# Patient Record
Sex: FEMALE | Race: WHITE | Employment: UNEMPLOYED | URBAN - METROPOLITAN AREA
[De-identification: names, ages, dates, MRNs, and addresses within clinical notes are randomized per-mention and may not be internally consistent; named-entity substitution may affect disease eponyms.]

---

## 2017-01-15 ENCOUNTER — COMMUNICATION - HEALTHEAST (OUTPATIENT)
Dept: INTERNAL MEDICINE | Facility: CLINIC | Age: 72
End: 2017-01-15

## 2017-01-15 DIAGNOSIS — F34.1 DYSTHYMIC DISORDER: ICD-10-CM

## 2017-02-01 ENCOUNTER — COMMUNICATION - HEALTHEAST (OUTPATIENT)
Dept: INTERNAL MEDICINE | Facility: CLINIC | Age: 72
End: 2017-02-01

## 2017-02-01 DIAGNOSIS — K21.9 GERD (GASTROESOPHAGEAL REFLUX DISEASE): ICD-10-CM

## 2017-03-29 ENCOUNTER — RECORDS - HEALTHEAST (OUTPATIENT)
Dept: ADMINISTRATIVE | Facility: OTHER | Age: 72
End: 2017-03-29

## 2017-03-29 ENCOUNTER — RECORDS - HEALTHEAST (OUTPATIENT)
Dept: BONE DENSITY | Facility: CLINIC | Age: 72
End: 2017-03-29

## 2017-03-29 DIAGNOSIS — Z78.0 ASYMPTOMATIC MENOPAUSAL STATE: ICD-10-CM

## 2017-04-04 ENCOUNTER — COMMUNICATION - HEALTHEAST (OUTPATIENT)
Dept: INTERNAL MEDICINE | Facility: CLINIC | Age: 72
End: 2017-04-04

## 2017-04-04 DIAGNOSIS — F41.9 ANXIETY: ICD-10-CM

## 2017-04-27 ENCOUNTER — COMMUNICATION - HEALTHEAST (OUTPATIENT)
Dept: INTERNAL MEDICINE | Facility: CLINIC | Age: 72
End: 2017-04-27

## 2017-04-28 ENCOUNTER — RECORDS - HEALTHEAST (OUTPATIENT)
Dept: ADMINISTRATIVE | Facility: OTHER | Age: 72
End: 2017-04-28

## 2017-05-03 ENCOUNTER — COMMUNICATION - HEALTHEAST (OUTPATIENT)
Dept: INTERNAL MEDICINE | Facility: CLINIC | Age: 72
End: 2017-05-03

## 2017-05-03 DIAGNOSIS — J45.40 MODERATE PERSISTENT ASTHMA: ICD-10-CM

## 2017-05-30 ENCOUNTER — OFFICE VISIT - HEALTHEAST (OUTPATIENT)
Dept: INTERNAL MEDICINE | Facility: CLINIC | Age: 72
End: 2017-05-30

## 2017-05-30 ENCOUNTER — HOSPITAL ENCOUNTER (OUTPATIENT)
Dept: LAB | Age: 72
Setting detail: SPECIMEN
Discharge: HOME | End: 2017-05-30

## 2017-05-30 ENCOUNTER — AMBULATORY - HEALTHEAST (OUTPATIENT)
Dept: INTERNAL MEDICINE | Facility: CLINIC | Age: 72
End: 2017-05-30

## 2017-05-30 ENCOUNTER — COMMUNICATION - HEALTHEAST (OUTPATIENT)
Dept: NURSING | Facility: CLINIC | Age: 72
End: 2017-05-30

## 2017-05-30 ENCOUNTER — RECORDS - HEALTHEAST (OUTPATIENT)
Dept: GENERAL RADIOLOGY | Facility: CLINIC | Age: 72
End: 2017-05-30

## 2017-05-30 DIAGNOSIS — M25.512 PAIN IN LEFT SHOULDER: ICD-10-CM

## 2017-05-30 DIAGNOSIS — M85.80 OSTEOPENIA WITH HIGH RISK OF FRACTURE: ICD-10-CM

## 2017-05-30 DIAGNOSIS — M25.512 LEFT SHOULDER PAIN: ICD-10-CM

## 2017-05-31 ENCOUNTER — COMMUNICATION - HEALTHEAST (OUTPATIENT)
Dept: NURSING | Facility: CLINIC | Age: 72
End: 2017-05-31

## 2017-06-01 ENCOUNTER — COMMUNICATION - HEALTHEAST (OUTPATIENT)
Dept: NURSING | Facility: CLINIC | Age: 72
End: 2017-06-01

## 2017-06-02 ENCOUNTER — COMMUNICATION - HEALTHEAST (OUTPATIENT)
Dept: NURSING | Facility: CLINIC | Age: 72
End: 2017-06-02

## 2017-06-02 LAB
ALBUMIN PERCENT: 62.1 % (ref 51–67)
ALBUMIN SERPL ELPH-MCNC: 4.3 G/DL (ref 3.2–4.7)
ALPHA 1 PERCENT: 2.1 % (ref 2–4)
ALPHA 2 PERCENT: 12.1 % (ref 5–13)
ALPHA1 GLOB SERPL ELPH-MCNC: 0.1 G/DL (ref 0.1–0.3)
ALPHA2 GLOB SERPL ELPH-MCNC: 0.8 G/DL (ref 0.4–0.9)
B-GLOBULIN SERPL ELPH-MCNC: 0.7 G/DL (ref 0.7–1.2)
BETA PERCENT: 10.4 % (ref 10–17)
GAMMA GLOB SERPL ELPH-MCNC: 0.9 G/DL (ref 0.6–1.4)
GAMMA GLOBULIN PERCENT: 13.3 % (ref 9–20)
PATH ICD:: NORMAL
PROT PATTERN SERPL ELPH-IMP: NORMAL
PROT SERPL-MCNC: 6.9 G/DL (ref 6–8)
REVIEWING PATHOLOGIST: NORMAL

## 2017-06-12 ENCOUNTER — AMBULATORY - HEALTHEAST (OUTPATIENT)
Dept: CARE COORDINATION | Facility: CLINIC | Age: 72
End: 2017-06-12

## 2017-06-13 ENCOUNTER — COMMUNICATION - HEALTHEAST (OUTPATIENT)
Dept: INTERNAL MEDICINE | Facility: CLINIC | Age: 72
End: 2017-06-13

## 2017-06-16 ENCOUNTER — COMMUNICATION - HEALTHEAST (OUTPATIENT)
Dept: ADMINISTRATIVE | Facility: CLINIC | Age: 72
End: 2017-06-16

## 2017-06-16 DIAGNOSIS — M85.80 OSTEOPENIA WITH HIGH RISK OF FRACTURE: ICD-10-CM

## 2017-06-20 ENCOUNTER — COMMUNICATION - HEALTHEAST (OUTPATIENT)
Dept: NURSING | Facility: CLINIC | Age: 72
End: 2017-06-20

## 2017-06-20 ENCOUNTER — OFFICE VISIT - HEALTHEAST (OUTPATIENT)
Dept: INTERNAL MEDICINE | Facility: CLINIC | Age: 72
End: 2017-06-20

## 2017-06-20 DIAGNOSIS — J45.40 MODERATE PERSISTENT ASTHMA WITHOUT COMPLICATION: ICD-10-CM

## 2017-06-20 DIAGNOSIS — F34.1 DYSTHYMIC DISORDER: ICD-10-CM

## 2017-06-27 ENCOUNTER — OFFICE VISIT - HEALTHEAST (OUTPATIENT)
Dept: PHYSICAL THERAPY | Facility: REHABILITATION | Age: 72
End: 2017-06-27

## 2017-06-27 DIAGNOSIS — J45.40 MODERATE PERSISTENT ASTHMA: ICD-10-CM

## 2017-06-27 DIAGNOSIS — M25.812 SHOULDER IMPINGEMENT, LEFT: ICD-10-CM

## 2017-06-27 DIAGNOSIS — R29.898 SHOULDER WEAKNESS: ICD-10-CM

## 2017-06-27 DIAGNOSIS — E78.00 PURE HYPERCHOLESTEROLEMIA: ICD-10-CM

## 2017-06-27 DIAGNOSIS — I10 ESSENTIAL HYPERTENSION: ICD-10-CM

## 2017-06-27 DIAGNOSIS — M25.512 ACUTE PAIN OF LEFT SHOULDER: ICD-10-CM

## 2017-07-06 ENCOUNTER — OFFICE VISIT - HEALTHEAST (OUTPATIENT)
Dept: PHYSICAL THERAPY | Facility: REHABILITATION | Age: 72
End: 2017-07-06

## 2017-07-06 DIAGNOSIS — I10 ESSENTIAL HYPERTENSION: ICD-10-CM

## 2017-07-06 DIAGNOSIS — M25.512 ACUTE PAIN OF LEFT SHOULDER: ICD-10-CM

## 2017-07-06 DIAGNOSIS — E78.00 PURE HYPERCHOLESTEROLEMIA: ICD-10-CM

## 2017-07-06 DIAGNOSIS — J45.40 MODERATE PERSISTENT ASTHMA: ICD-10-CM

## 2017-07-06 DIAGNOSIS — R29.898 SHOULDER WEAKNESS: ICD-10-CM

## 2017-07-06 DIAGNOSIS — M25.812 SHOULDER IMPINGEMENT, LEFT: ICD-10-CM

## 2017-07-13 ENCOUNTER — OFFICE VISIT - HEALTHEAST (OUTPATIENT)
Dept: PHYSICAL THERAPY | Facility: REHABILITATION | Age: 72
End: 2017-07-13

## 2017-07-13 DIAGNOSIS — J45.40 MODERATE PERSISTENT ASTHMA: ICD-10-CM

## 2017-07-13 DIAGNOSIS — M25.512 ACUTE PAIN OF LEFT SHOULDER: ICD-10-CM

## 2017-07-13 DIAGNOSIS — R29.898 SHOULDER WEAKNESS: ICD-10-CM

## 2017-07-13 DIAGNOSIS — I10 ESSENTIAL HYPERTENSION: ICD-10-CM

## 2017-07-13 DIAGNOSIS — E78.00 PURE HYPERCHOLESTEROLEMIA: ICD-10-CM

## 2017-07-13 DIAGNOSIS — M25.812 SHOULDER IMPINGEMENT, LEFT: ICD-10-CM

## 2017-07-18 ENCOUNTER — COMMUNICATION - HEALTHEAST (OUTPATIENT)
Dept: FAMILY MEDICINE | Facility: CLINIC | Age: 72
End: 2017-07-18

## 2017-07-21 ENCOUNTER — COMMUNICATION - HEALTHEAST (OUTPATIENT)
Dept: INTERNAL MEDICINE | Facility: CLINIC | Age: 72
End: 2017-07-21

## 2017-07-21 DIAGNOSIS — F34.1 DYSTHYMIC DISORDER: ICD-10-CM

## 2017-07-27 ENCOUNTER — COMMUNICATION - HEALTHEAST (OUTPATIENT)
Dept: INTERNAL MEDICINE | Facility: CLINIC | Age: 72
End: 2017-07-27

## 2017-07-27 DIAGNOSIS — K21.9 GERD (GASTROESOPHAGEAL REFLUX DISEASE): ICD-10-CM

## 2017-07-31 ENCOUNTER — OFFICE VISIT - HEALTHEAST (OUTPATIENT)
Dept: PHYSICAL THERAPY | Facility: REHABILITATION | Age: 72
End: 2017-07-31

## 2017-07-31 DIAGNOSIS — J45.40 MODERATE PERSISTENT ASTHMA: ICD-10-CM

## 2017-07-31 DIAGNOSIS — M85.80 OSTEOPENIA WITH HIGH RISK OF FRACTURE: ICD-10-CM

## 2017-07-31 DIAGNOSIS — R29.898 SHOULDER WEAKNESS: ICD-10-CM

## 2017-07-31 DIAGNOSIS — M25.812 SHOULDER IMPINGEMENT, LEFT: ICD-10-CM

## 2017-07-31 DIAGNOSIS — E78.00 PURE HYPERCHOLESTEROLEMIA: ICD-10-CM

## 2017-07-31 DIAGNOSIS — M25.512 ACUTE PAIN OF LEFT SHOULDER: ICD-10-CM

## 2017-08-10 ENCOUNTER — OFFICE VISIT - HEALTHEAST (OUTPATIENT)
Dept: PHYSICAL THERAPY | Facility: REHABILITATION | Age: 72
End: 2017-08-10

## 2017-08-10 DIAGNOSIS — R29.898 SHOULDER WEAKNESS: ICD-10-CM

## 2017-08-10 DIAGNOSIS — M25.512 ACUTE PAIN OF LEFT SHOULDER: ICD-10-CM

## 2017-08-10 DIAGNOSIS — M25.812 SHOULDER IMPINGEMENT, LEFT: ICD-10-CM

## 2017-08-10 DIAGNOSIS — J45.40 MODERATE PERSISTENT ASTHMA: ICD-10-CM

## 2017-08-10 DIAGNOSIS — M85.80 OSTEOPENIA WITH HIGH RISK OF FRACTURE: ICD-10-CM

## 2017-08-10 DIAGNOSIS — E78.00 PURE HYPERCHOLESTEROLEMIA: ICD-10-CM

## 2017-08-11 ENCOUNTER — COMMUNICATION - HEALTHEAST (OUTPATIENT)
Dept: NURSING | Facility: CLINIC | Age: 72
End: 2017-08-11

## 2017-08-16 ENCOUNTER — COMMUNICATION - HEALTHEAST (OUTPATIENT)
Dept: SCHEDULING | Facility: CLINIC | Age: 72
End: 2017-08-16

## 2017-08-17 ENCOUNTER — OFFICE VISIT - HEALTHEAST (OUTPATIENT)
Dept: FAMILY MEDICINE | Facility: CLINIC | Age: 72
End: 2017-08-17

## 2017-08-17 DIAGNOSIS — H93.8X2 EAR PRESSURE, LEFT: ICD-10-CM

## 2017-08-17 ASSESSMENT — MIFFLIN-ST. JEOR: SCORE: 1054.12

## 2017-08-18 ENCOUNTER — OFFICE VISIT - HEALTHEAST (OUTPATIENT)
Dept: ENDOCRINOLOGY | Facility: CLINIC | Age: 72
End: 2017-08-18

## 2017-08-18 DIAGNOSIS — M85.80 OSTEOPENIA WITH HIGH RISK OF FRACTURE: ICD-10-CM

## 2017-08-18 ASSESSMENT — MIFFLIN-ST. JEOR: SCORE: 1052.3

## 2017-08-22 ENCOUNTER — OFFICE VISIT - HEALTHEAST (OUTPATIENT)
Dept: PHYSICAL THERAPY | Facility: REHABILITATION | Age: 72
End: 2017-08-22

## 2017-08-22 DIAGNOSIS — M85.80 OSTEOPENIA WITH HIGH RISK OF FRACTURE: ICD-10-CM

## 2017-08-22 DIAGNOSIS — M25.812 SHOULDER IMPINGEMENT, LEFT: ICD-10-CM

## 2017-08-22 DIAGNOSIS — E78.00 PURE HYPERCHOLESTEROLEMIA: ICD-10-CM

## 2017-08-22 DIAGNOSIS — R29.898 SHOULDER WEAKNESS: ICD-10-CM

## 2017-08-22 DIAGNOSIS — M25.512 ACUTE PAIN OF LEFT SHOULDER: ICD-10-CM

## 2017-08-24 ENCOUNTER — OFFICE VISIT - HEALTHEAST (OUTPATIENT)
Dept: PHYSICAL THERAPY | Facility: REHABILITATION | Age: 72
End: 2017-08-24

## 2017-08-24 DIAGNOSIS — I10 ESSENTIAL HYPERTENSION: ICD-10-CM

## 2017-08-24 DIAGNOSIS — R29.898 SHOULDER WEAKNESS: ICD-10-CM

## 2017-08-24 DIAGNOSIS — M25.512 ACUTE PAIN OF LEFT SHOULDER: ICD-10-CM

## 2017-08-24 DIAGNOSIS — J45.40 MODERATE PERSISTENT ASTHMA: ICD-10-CM

## 2017-08-24 DIAGNOSIS — E78.00 PURE HYPERCHOLESTEROLEMIA: ICD-10-CM

## 2017-08-24 DIAGNOSIS — M85.80 OSTEOPENIA WITH HIGH RISK OF FRACTURE: ICD-10-CM

## 2017-08-24 DIAGNOSIS — M25.812 SHOULDER IMPINGEMENT, LEFT: ICD-10-CM

## 2017-08-25 ENCOUNTER — OFFICE VISIT - HEALTHEAST (OUTPATIENT)
Dept: INTERNAL MEDICINE | Facility: CLINIC | Age: 72
End: 2017-08-25

## 2017-08-25 ENCOUNTER — COMMUNICATION - HEALTHEAST (OUTPATIENT)
Dept: INTERNAL MEDICINE | Facility: CLINIC | Age: 72
End: 2017-08-25

## 2017-08-25 DIAGNOSIS — H69.90 EUSTACHIAN TUBE DYSFUNCTION: ICD-10-CM

## 2017-08-28 ENCOUNTER — OFFICE VISIT - HEALTHEAST (OUTPATIENT)
Dept: PHYSICAL THERAPY | Facility: REHABILITATION | Age: 72
End: 2017-08-28

## 2017-08-28 DIAGNOSIS — M25.512 ACUTE PAIN OF LEFT SHOULDER: ICD-10-CM

## 2017-08-28 DIAGNOSIS — R29.898 SHOULDER WEAKNESS: ICD-10-CM

## 2017-08-28 DIAGNOSIS — I10 ESSENTIAL HYPERTENSION: ICD-10-CM

## 2017-08-28 DIAGNOSIS — J45.40 MODERATE PERSISTENT ASTHMA: ICD-10-CM

## 2017-08-28 DIAGNOSIS — M85.80 OSTEOPENIA WITH HIGH RISK OF FRACTURE: ICD-10-CM

## 2017-08-28 DIAGNOSIS — E78.00 PURE HYPERCHOLESTEROLEMIA: ICD-10-CM

## 2017-08-28 DIAGNOSIS — M25.812 SHOULDER IMPINGEMENT, LEFT: ICD-10-CM

## 2017-08-29 ENCOUNTER — RECORDS - HEALTHEAST (OUTPATIENT)
Dept: ADMINISTRATIVE | Facility: OTHER | Age: 72
End: 2017-08-29

## 2017-08-30 ENCOUNTER — COMMUNICATION - HEALTHEAST (OUTPATIENT)
Dept: SCHEDULING | Facility: CLINIC | Age: 72
End: 2017-08-30

## 2017-08-30 ENCOUNTER — COMMUNICATION - HEALTHEAST (OUTPATIENT)
Dept: NURSING | Facility: CLINIC | Age: 72
End: 2017-08-30

## 2017-08-30 ENCOUNTER — OFFICE VISIT - HEALTHEAST (OUTPATIENT)
Dept: INTERNAL MEDICINE | Facility: CLINIC | Age: 72
End: 2017-08-30

## 2017-08-30 ENCOUNTER — COMMUNICATION - HEALTHEAST (OUTPATIENT)
Dept: INTERNAL MEDICINE | Facility: CLINIC | Age: 72
End: 2017-08-30

## 2017-08-30 DIAGNOSIS — S81.801A LEG WOUND, RIGHT: ICD-10-CM

## 2017-08-30 DIAGNOSIS — I10 HTN (HYPERTENSION): ICD-10-CM

## 2017-08-31 ENCOUNTER — COMMUNICATION - HEALTHEAST (OUTPATIENT)
Dept: INTERNAL MEDICINE | Facility: CLINIC | Age: 72
End: 2017-08-31

## 2017-08-31 DIAGNOSIS — E78.00 HYPERCHOLESTEROLEMIA: ICD-10-CM

## 2017-09-02 ENCOUNTER — COMMUNICATION - HEALTHEAST (OUTPATIENT)
Dept: INTERNAL MEDICINE | Facility: CLINIC | Age: 72
End: 2017-09-02

## 2017-09-02 DIAGNOSIS — F41.9 ANXIETY: ICD-10-CM

## 2017-09-06 ENCOUNTER — COMMUNICATION - HEALTHEAST (OUTPATIENT)
Dept: INTERNAL MEDICINE | Facility: CLINIC | Age: 72
End: 2017-09-06

## 2017-09-06 DIAGNOSIS — K21.9 GERD (GASTROESOPHAGEAL REFLUX DISEASE): ICD-10-CM

## 2017-09-08 ENCOUNTER — COMMUNICATION - HEALTHEAST (OUTPATIENT)
Dept: NURSING | Facility: CLINIC | Age: 72
End: 2017-09-08

## 2017-09-13 ENCOUNTER — COMMUNICATION - HEALTHEAST (OUTPATIENT)
Dept: NURSING | Facility: CLINIC | Age: 72
End: 2017-09-13

## 2017-09-26 ENCOUNTER — OFFICE VISIT - HEALTHEAST (OUTPATIENT)
Dept: INTERNAL MEDICINE | Facility: CLINIC | Age: 72
End: 2017-09-26

## 2017-09-26 DIAGNOSIS — R05.9 COUGH: ICD-10-CM

## 2017-09-27 ENCOUNTER — COMMUNICATION - HEALTHEAST (OUTPATIENT)
Dept: INTERNAL MEDICINE | Facility: CLINIC | Age: 72
End: 2017-09-27

## 2017-10-20 ENCOUNTER — COMMUNICATION - HEALTHEAST (OUTPATIENT)
Dept: NURSING | Facility: CLINIC | Age: 72
End: 2017-10-20

## 2017-11-03 ENCOUNTER — COMMUNICATION - HEALTHEAST (OUTPATIENT)
Dept: NURSING | Facility: CLINIC | Age: 72
End: 2017-11-03

## 2017-11-08 ENCOUNTER — COMMUNICATION - HEALTHEAST (OUTPATIENT)
Dept: NURSING | Facility: CLINIC | Age: 72
End: 2017-11-08

## 2017-11-28 ENCOUNTER — COMMUNICATION - HEALTHEAST (OUTPATIENT)
Dept: INTERNAL MEDICINE | Facility: CLINIC | Age: 72
End: 2017-11-28

## 2017-11-28 DIAGNOSIS — K21.9 GERD (GASTROESOPHAGEAL REFLUX DISEASE): ICD-10-CM

## 2017-12-21 ENCOUNTER — COMMUNICATION - HEALTHEAST (OUTPATIENT)
Dept: FAMILY MEDICINE | Facility: CLINIC | Age: 72
End: 2017-12-21

## 2018-01-05 ENCOUNTER — COMMUNICATION - HEALTHEAST (OUTPATIENT)
Dept: NURSING | Facility: CLINIC | Age: 73
End: 2018-01-05

## 2018-01-12 ENCOUNTER — RECORDS - HEALTHEAST (OUTPATIENT)
Dept: ADMINISTRATIVE | Facility: OTHER | Age: 73
End: 2018-01-12

## 2018-01-23 ENCOUNTER — COMMUNICATION - HEALTHEAST (OUTPATIENT)
Dept: NURSING | Facility: CLINIC | Age: 73
End: 2018-01-23

## 2018-02-07 ENCOUNTER — OFFICE VISIT - HEALTHEAST (OUTPATIENT)
Dept: INTERNAL MEDICINE | Facility: CLINIC | Age: 73
End: 2018-02-07

## 2018-02-07 DIAGNOSIS — Z01.818 ENCOUNTER FOR PREOPERATIVE EXAMINATION FOR GENERAL SURGICAL PROCEDURE: ICD-10-CM

## 2018-02-07 DIAGNOSIS — I10 HTN (HYPERTENSION): ICD-10-CM

## 2018-02-07 DIAGNOSIS — Z01.810 PREOP CARDIOVASCULAR EXAM: ICD-10-CM

## 2018-02-07 DIAGNOSIS — K21.9 ESOPHAGEAL REFLUX: ICD-10-CM

## 2018-02-07 DIAGNOSIS — J45.40 MODERATE PERSISTENT ASTHMA: ICD-10-CM

## 2018-02-07 DIAGNOSIS — Z12.31 VISIT FOR SCREENING MAMMOGRAM: ICD-10-CM

## 2018-02-07 DIAGNOSIS — M17.11 ARTHRITIS OF RIGHT KNEE: ICD-10-CM

## 2018-02-07 LAB
ANION GAP SERPL CALCULATED.3IONS-SCNC: 8 MMOL/L (ref 5–18)
ATRIAL RATE - MUSE: 70 BPM
BUN SERPL-MCNC: 17 MG/DL (ref 8–28)
CALCIUM SERPL-MCNC: 9.8 MG/DL (ref 8.5–10.5)
CHLORIDE BLD-SCNC: 105 MMOL/L (ref 98–107)
CO2 SERPL-SCNC: 27 MMOL/L (ref 22–31)
CREAT SERPL-MCNC: 0.75 MG/DL (ref 0.6–1.1)
DIASTOLIC BLOOD PRESSURE - MUSE: NORMAL MMHG
ERYTHROCYTE [DISTWIDTH] IN BLOOD BY AUTOMATED COUNT: 11.6 % (ref 11–14.5)
GFR SERPL CREATININE-BSD FRML MDRD: >60 ML/MIN/1.73M2
GLUCOSE BLD-MCNC: 87 MG/DL (ref 70–125)
HCT VFR BLD AUTO: 39.8 % (ref 35–47)
HGB BLD-MCNC: 13.1 G/DL (ref 12–16)
INTERPRETATION ECG - MUSE: NORMAL
MCH RBC QN AUTO: 30.5 PG (ref 27–34)
MCHC RBC AUTO-ENTMCNC: 32.8 G/DL (ref 32–36)
MCV RBC AUTO: 93 FL (ref 80–100)
P AXIS - MUSE: 61 DEGREES
PLATELET # BLD AUTO: 320 THOU/UL (ref 140–440)
PMV BLD AUTO: 7.3 FL (ref 7–10)
POTASSIUM BLD-SCNC: 4 MMOL/L (ref 3.5–5)
PR INTERVAL - MUSE: 124 MS
QRS DURATION - MUSE: 70 MS
QT - MUSE: 388 MS
QTC - MUSE: 419 MS
R AXIS - MUSE: 82 DEGREES
RBC # BLD AUTO: 4.29 MILL/UL (ref 3.8–5.4)
SODIUM SERPL-SCNC: 140 MMOL/L (ref 136–145)
SYSTOLIC BLOOD PRESSURE - MUSE: NORMAL MMHG
T AXIS - MUSE: 23 DEGREES
VENTRICULAR RATE- MUSE: 70 BPM
WBC: 8.9 THOU/UL (ref 4–11)

## 2018-02-07 ASSESSMENT — MIFFLIN-ST. JEOR: SCORE: 1061.66

## 2018-02-13 ENCOUNTER — OFFICE VISIT - HEALTHEAST (OUTPATIENT)
Dept: INTERNAL MEDICINE | Facility: CLINIC | Age: 73
End: 2018-02-13

## 2018-02-13 DIAGNOSIS — J32.9 SINUSITIS: ICD-10-CM

## 2018-02-22 ASSESSMENT — MIFFLIN-ST. JEOR: SCORE: 1056.27

## 2018-02-25 ENCOUNTER — COMMUNICATION - HEALTHEAST (OUTPATIENT)
Dept: INTERNAL MEDICINE | Facility: CLINIC | Age: 73
End: 2018-02-25

## 2018-02-25 DIAGNOSIS — F34.1 DYSTHYMIC DISORDER: ICD-10-CM

## 2018-02-26 ENCOUNTER — COMMUNICATION - HEALTHEAST (OUTPATIENT)
Dept: INTERNAL MEDICINE | Facility: CLINIC | Age: 73
End: 2018-02-26

## 2018-02-26 DIAGNOSIS — F34.1 DYSTHYMIC DISORDER: ICD-10-CM

## 2018-02-28 ENCOUNTER — COMMUNICATION - HEALTHEAST (OUTPATIENT)
Dept: INTERNAL MEDICINE | Facility: CLINIC | Age: 73
End: 2018-02-28

## 2018-02-28 ENCOUNTER — ANESTHESIA - HEALTHEAST (OUTPATIENT)
Dept: SURGERY | Facility: CLINIC | Age: 73
End: 2018-02-28

## 2018-03-01 ENCOUNTER — COMMUNICATION - HEALTHEAST (OUTPATIENT)
Dept: NURSING | Facility: CLINIC | Age: 73
End: 2018-03-01

## 2018-03-01 ENCOUNTER — HOME CARE/HOSPICE - HEALTHEAST (OUTPATIENT)
Dept: HOME HEALTH SERVICES | Facility: HOME HEALTH | Age: 73
End: 2018-03-01

## 2018-03-01 ENCOUNTER — SURGERY - HEALTHEAST (OUTPATIENT)
Dept: SURGERY | Facility: CLINIC | Age: 73
End: 2018-03-01

## 2018-03-01 ASSESSMENT — MIFFLIN-ST. JEOR
SCORE: 1049.47
SCORE: 1047.2

## 2018-03-05 ENCOUNTER — AMBULATORY - HEALTHEAST (OUTPATIENT)
Dept: GERIATRICS | Facility: CLINIC | Age: 73
End: 2018-03-05

## 2018-03-06 ENCOUNTER — OFFICE VISIT - HEALTHEAST (OUTPATIENT)
Dept: GERIATRICS | Facility: CLINIC | Age: 73
End: 2018-03-06

## 2018-03-06 DIAGNOSIS — K21.9 GASTROESOPHAGEAL REFLUX DISEASE WITHOUT ESOPHAGITIS: ICD-10-CM

## 2018-03-06 DIAGNOSIS — D64.9 ANEMIA: ICD-10-CM

## 2018-03-06 DIAGNOSIS — Z96.651 STATUS POST TOTAL RIGHT KNEE REPLACEMENT: ICD-10-CM

## 2018-03-06 DIAGNOSIS — I10 ESSENTIAL HYPERTENSION: ICD-10-CM

## 2018-03-06 DIAGNOSIS — J45.40 MODERATE PERSISTENT ASTHMA WITHOUT COMPLICATION: ICD-10-CM

## 2018-03-12 ENCOUNTER — RECORDS - HEALTHEAST (OUTPATIENT)
Dept: LAB | Facility: CLINIC | Age: 73
End: 2018-03-12

## 2018-03-13 ENCOUNTER — OFFICE VISIT - HEALTHEAST (OUTPATIENT)
Dept: GERIATRICS | Facility: CLINIC | Age: 73
End: 2018-03-13

## 2018-03-13 ENCOUNTER — COMMUNICATION - HEALTHEAST (OUTPATIENT)
Dept: NURSING | Facility: CLINIC | Age: 73
End: 2018-03-13

## 2018-03-13 DIAGNOSIS — D64.9 ANEMIA: ICD-10-CM

## 2018-03-13 DIAGNOSIS — Z96.651 STATUS POST TOTAL RIGHT KNEE REPLACEMENT: ICD-10-CM

## 2018-03-13 DIAGNOSIS — J45.40 MODERATE PERSISTENT ASTHMA WITHOUT COMPLICATION: ICD-10-CM

## 2018-03-13 DIAGNOSIS — K21.9 GASTROESOPHAGEAL REFLUX DISEASE WITHOUT ESOPHAGITIS: ICD-10-CM

## 2018-03-13 DIAGNOSIS — I10 ESSENTIAL HYPERTENSION: ICD-10-CM

## 2018-03-13 LAB
ANION GAP SERPL CALCULATED.3IONS-SCNC: 9 MMOL/L (ref 5–18)
BUN SERPL-MCNC: 16 MG/DL (ref 8–28)
CALCIUM SERPL-MCNC: 9.9 MG/DL (ref 8.5–10.5)
CHLORIDE BLD-SCNC: 106 MMOL/L (ref 98–107)
CO2 SERPL-SCNC: 25 MMOL/L (ref 22–31)
CREAT SERPL-MCNC: 0.78 MG/DL (ref 0.6–1.1)
ERYTHROCYTE [DISTWIDTH] IN BLOOD BY AUTOMATED COUNT: 14.6 % (ref 11–14.5)
GFR SERPL CREATININE-BSD FRML MDRD: >60 ML/MIN/1.73M2
GLUCOSE BLD-MCNC: 90 MG/DL (ref 70–125)
HCT VFR BLD AUTO: 32.8 % (ref 35–47)
HGB BLD-MCNC: 10.5 G/DL (ref 12–16)
MAGNESIUM SERPL-MCNC: 2.2 MG/DL (ref 1.8–2.6)
MCH RBC QN AUTO: 30.7 PG (ref 27–34)
MCHC RBC AUTO-ENTMCNC: 32 G/DL (ref 32–36)
MCV RBC AUTO: 96 FL (ref 80–100)
PLATELET # BLD AUTO: 510 THOU/UL (ref 140–440)
PMV BLD AUTO: 10 FL (ref 8.5–12.5)
POTASSIUM BLD-SCNC: 4.3 MMOL/L (ref 3.5–5)
RBC # BLD AUTO: 3.42 MILL/UL (ref 3.8–5.4)
SODIUM SERPL-SCNC: 140 MMOL/L (ref 136–145)
WBC: 14.9 THOU/UL (ref 4–11)

## 2018-03-14 ENCOUNTER — RECORDS - HEALTHEAST (OUTPATIENT)
Dept: ADMINISTRATIVE | Facility: OTHER | Age: 73
End: 2018-03-14

## 2018-03-14 LAB — 25(OH)D3 SERPL-MCNC: 58.1 NG/ML (ref 30–80)

## 2018-03-15 ENCOUNTER — OFFICE VISIT - HEALTHEAST (OUTPATIENT)
Dept: GERIATRICS | Facility: CLINIC | Age: 73
End: 2018-03-15

## 2018-03-15 DIAGNOSIS — Z96.651 STATUS POST TOTAL RIGHT KNEE REPLACEMENT: ICD-10-CM

## 2018-03-15 DIAGNOSIS — J45.40 MODERATE PERSISTENT ASTHMA WITHOUT COMPLICATION: ICD-10-CM

## 2018-03-15 DIAGNOSIS — I10 ESSENTIAL HYPERTENSION: ICD-10-CM

## 2018-03-15 DIAGNOSIS — K21.9 GASTROESOPHAGEAL REFLUX DISEASE WITHOUT ESOPHAGITIS: ICD-10-CM

## 2018-03-17 ENCOUNTER — RECORDS - HEALTHEAST (OUTPATIENT)
Dept: LAB | Facility: CLINIC | Age: 73
End: 2018-03-17

## 2018-03-17 LAB
ALBUMIN UR-MCNC: NEGATIVE MG/DL
APPEARANCE UR: CLEAR
BILIRUB UR QL STRIP: NEGATIVE
COLOR UR AUTO: YELLOW
GLUCOSE UR STRIP-MCNC: NEGATIVE MG/DL
HGB UR QL STRIP: NEGATIVE
KETONES UR STRIP-MCNC: NEGATIVE MG/DL
LEUKOCYTE ESTERASE UR QL STRIP: NEGATIVE
NITRATE UR QL: NEGATIVE
PH UR STRIP: 5 [PH] (ref 4.5–8)
SP GR UR STRIP: 1.02 (ref 1–1.03)
UROBILINOGEN UR STRIP-ACNC: NORMAL

## 2018-03-18 LAB — BACTERIA SPEC CULT: NO GROWTH

## 2018-03-19 ENCOUNTER — OFFICE VISIT - HEALTHEAST (OUTPATIENT)
Dept: GERIATRICS | Facility: CLINIC | Age: 73
End: 2018-03-19

## 2018-03-19 DIAGNOSIS — Z96.651 STATUS POST TOTAL RIGHT KNEE REPLACEMENT: ICD-10-CM

## 2018-03-19 DIAGNOSIS — K21.9 GASTROESOPHAGEAL REFLUX DISEASE WITHOUT ESOPHAGITIS: ICD-10-CM

## 2018-03-19 DIAGNOSIS — K59.03 DRUG-INDUCED CONSTIPATION: ICD-10-CM

## 2018-03-19 DIAGNOSIS — I10 ESSENTIAL HYPERTENSION: ICD-10-CM

## 2018-03-19 DIAGNOSIS — R11.2 NON-INTRACTABLE VOMITING WITH NAUSEA, UNSPECIFIED VOMITING TYPE: ICD-10-CM

## 2018-03-19 DIAGNOSIS — R52 PAIN: ICD-10-CM

## 2018-03-20 ENCOUNTER — OFFICE VISIT - HEALTHEAST (OUTPATIENT)
Dept: GERIATRICS | Facility: CLINIC | Age: 73
End: 2018-03-20

## 2018-03-20 DIAGNOSIS — Z96.651 STATUS POST TOTAL RIGHT KNEE REPLACEMENT: ICD-10-CM

## 2018-03-20 DIAGNOSIS — R52 PAIN: ICD-10-CM

## 2018-03-20 DIAGNOSIS — D64.9 ANEMIA: ICD-10-CM

## 2018-03-20 DIAGNOSIS — J45.40 MODERATE PERSISTENT ASTHMA WITHOUT COMPLICATION: ICD-10-CM

## 2018-03-21 ENCOUNTER — AMBULATORY - HEALTHEAST (OUTPATIENT)
Dept: GERIATRICS | Facility: CLINIC | Age: 73
End: 2018-03-21

## 2018-03-21 ENCOUNTER — COMMUNICATION - HEALTHEAST (OUTPATIENT)
Dept: GERIATRICS | Facility: CLINIC | Age: 73
End: 2018-03-21

## 2018-03-27 ENCOUNTER — OFFICE VISIT - HEALTHEAST (OUTPATIENT)
Dept: INTERNAL MEDICINE | Facility: CLINIC | Age: 73
End: 2018-03-27

## 2018-03-27 DIAGNOSIS — D64.9 ANEMIA: ICD-10-CM

## 2018-03-27 DIAGNOSIS — R11.0 NAUSEA: ICD-10-CM

## 2018-03-27 DIAGNOSIS — R52 PAIN: ICD-10-CM

## 2018-03-27 LAB
ALBUMIN SERPL-MCNC: 4.1 G/DL (ref 3.5–5)
ALP SERPL-CCNC: 94 U/L (ref 45–120)
ALT SERPL W P-5'-P-CCNC: 20 U/L (ref 0–45)
ANION GAP SERPL CALCULATED.3IONS-SCNC: 15 MMOL/L (ref 5–18)
AST SERPL W P-5'-P-CCNC: 28 U/L (ref 0–40)
BILIRUB SERPL-MCNC: 0.8 MG/DL (ref 0–1)
BUN SERPL-MCNC: 16 MG/DL (ref 8–28)
CALCIUM SERPL-MCNC: 10.1 MG/DL (ref 8.5–10.5)
CHLORIDE BLD-SCNC: 104 MMOL/L (ref 98–107)
CO2 SERPL-SCNC: 19 MMOL/L (ref 22–31)
CREAT SERPL-MCNC: 0.78 MG/DL (ref 0.6–1.1)
ERYTHROCYTE [DISTWIDTH] IN BLOOD BY AUTOMATED COUNT: 11.6 % (ref 11–14.5)
GFR SERPL CREATININE-BSD FRML MDRD: >60 ML/MIN/1.73M2
GLUCOSE BLD-MCNC: 88 MG/DL (ref 70–125)
HCT VFR BLD AUTO: 35.3 % (ref 35–47)
HGB BLD-MCNC: 11.7 G/DL (ref 12–16)
MCH RBC QN AUTO: 30.6 PG (ref 27–34)
MCHC RBC AUTO-ENTMCNC: 33.2 G/DL (ref 32–36)
MCV RBC AUTO: 92 FL (ref 80–100)
PLATELET # BLD AUTO: 513 THOU/UL (ref 140–440)
PMV BLD AUTO: 6.9 FL (ref 7–10)
POTASSIUM BLD-SCNC: 4.1 MMOL/L (ref 3.5–5)
PROT SERPL-MCNC: 7.3 G/DL (ref 6–8)
RBC # BLD AUTO: 3.83 MILL/UL (ref 3.8–5.4)
SODIUM SERPL-SCNC: 138 MMOL/L (ref 136–145)
WBC: 10.8 THOU/UL (ref 4–11)

## 2018-04-03 ENCOUNTER — COMMUNICATION - HEALTHEAST (OUTPATIENT)
Dept: INTERNAL MEDICINE | Facility: CLINIC | Age: 73
End: 2018-04-03

## 2018-04-04 ENCOUNTER — RECORDS - HEALTHEAST (OUTPATIENT)
Dept: ADMINISTRATIVE | Facility: OTHER | Age: 73
End: 2018-04-04

## 2018-04-05 ENCOUNTER — RECORDS - HEALTHEAST (OUTPATIENT)
Dept: ADMINISTRATIVE | Facility: OTHER | Age: 73
End: 2018-04-05

## 2018-04-24 ENCOUNTER — COMMUNICATION - HEALTHEAST (OUTPATIENT)
Dept: NURSING | Facility: CLINIC | Age: 73
End: 2018-04-24

## 2018-05-02 ENCOUNTER — AMBULATORY - HEALTHEAST (OUTPATIENT)
Dept: INTERNAL MEDICINE | Facility: CLINIC | Age: 73
End: 2018-05-02

## 2018-05-02 ENCOUNTER — OFFICE VISIT - HEALTHEAST (OUTPATIENT)
Dept: INTERNAL MEDICINE | Facility: CLINIC | Age: 73
End: 2018-05-02

## 2018-05-02 DIAGNOSIS — R52 PAIN: ICD-10-CM

## 2018-05-02 DIAGNOSIS — D64.9 ANEMIA: ICD-10-CM

## 2018-05-02 DIAGNOSIS — Z96.651 STATUS POST TOTAL RIGHT KNEE REPLACEMENT: ICD-10-CM

## 2018-05-02 DIAGNOSIS — R32 URINARY INCONTINENCE: ICD-10-CM

## 2018-05-02 LAB
ERYTHROCYTE [DISTWIDTH] IN BLOOD BY AUTOMATED COUNT: 11.6 % (ref 11–14.5)
HCT VFR BLD AUTO: 37.3 % (ref 35–47)
HGB BLD-MCNC: 12.4 G/DL (ref 12–16)
MCH RBC QN AUTO: 30.6 PG (ref 27–34)
MCHC RBC AUTO-ENTMCNC: 33.3 G/DL (ref 32–36)
MCV RBC AUTO: 92 FL (ref 80–100)
PLATELET # BLD AUTO: 348 THOU/UL (ref 140–440)
PMV BLD AUTO: 6.8 FL (ref 7–10)
RBC # BLD AUTO: 4.05 MILL/UL (ref 3.8–5.4)
WBC: 10.3 THOU/UL (ref 4–11)

## 2018-05-03 ENCOUNTER — COMMUNICATION - HEALTHEAST (OUTPATIENT)
Dept: NURSING | Facility: CLINIC | Age: 73
End: 2018-05-03

## 2018-05-18 ENCOUNTER — COMMUNICATION - HEALTHEAST (OUTPATIENT)
Dept: SCHEDULING | Facility: CLINIC | Age: 73
End: 2018-05-18

## 2018-05-25 ENCOUNTER — RECORDS - HEALTHEAST (OUTPATIENT)
Dept: ADMINISTRATIVE | Facility: OTHER | Age: 73
End: 2018-05-25

## 2018-06-14 ENCOUNTER — COMMUNICATION - HEALTHEAST (OUTPATIENT)
Dept: NURSING | Facility: CLINIC | Age: 73
End: 2018-06-14

## 2018-06-29 ENCOUNTER — COMMUNICATION - HEALTHEAST (OUTPATIENT)
Dept: NURSING | Facility: CLINIC | Age: 73
End: 2018-06-29

## 2018-07-05 ENCOUNTER — COMMUNICATION - HEALTHEAST (OUTPATIENT)
Dept: NURSING | Facility: CLINIC | Age: 73
End: 2018-07-05

## 2018-08-16 ENCOUNTER — COMMUNICATION - HEALTHEAST (OUTPATIENT)
Dept: NURSING | Facility: CLINIC | Age: 73
End: 2018-08-16

## 2018-08-24 ENCOUNTER — COMMUNICATION - HEALTHEAST (OUTPATIENT)
Dept: INTERNAL MEDICINE | Facility: CLINIC | Age: 73
End: 2018-08-24

## 2018-08-24 DIAGNOSIS — F34.1 DYSTHYMIC DISORDER: ICD-10-CM

## 2018-08-24 DIAGNOSIS — I10 HTN (HYPERTENSION): ICD-10-CM

## 2018-09-17 ENCOUNTER — COMMUNICATION - HEALTHEAST (OUTPATIENT)
Dept: NURSING | Facility: CLINIC | Age: 73
End: 2018-09-17

## 2018-10-04 ENCOUNTER — RECORDS - HEALTHEAST (OUTPATIENT)
Dept: ADMINISTRATIVE | Facility: OTHER | Age: 73
End: 2018-10-04

## 2018-10-24 ENCOUNTER — COMMUNICATION - HEALTHEAST (OUTPATIENT)
Dept: INTERNAL MEDICINE | Facility: CLINIC | Age: 73
End: 2018-10-24

## 2018-10-24 DIAGNOSIS — E78.00 HYPERCHOLESTEROLEMIA: ICD-10-CM

## 2018-12-09 ENCOUNTER — COMMUNICATION - HEALTHEAST (OUTPATIENT)
Dept: INTERNAL MEDICINE | Facility: CLINIC | Age: 73
End: 2018-12-09

## 2018-12-09 DIAGNOSIS — K21.9 GASTROESOPHAGEAL REFLUX DISEASE WITHOUT ESOPHAGITIS: ICD-10-CM

## 2018-12-17 ENCOUNTER — COMMUNICATION - HEALTHEAST (OUTPATIENT)
Dept: NURSING | Facility: CLINIC | Age: 73
End: 2018-12-17

## 2018-12-29 ENCOUNTER — OFFICE VISIT - HEALTHEAST (OUTPATIENT)
Dept: FAMILY MEDICINE | Facility: CLINIC | Age: 73
End: 2018-12-29

## 2018-12-29 DIAGNOSIS — J06.9 VIRAL URI: ICD-10-CM

## 2018-12-29 DIAGNOSIS — J01.00 ACUTE MAXILLARY SINUSITIS, RECURRENCE NOT SPECIFIED: ICD-10-CM

## 2018-12-31 ENCOUNTER — COMMUNICATION - HEALTHEAST (OUTPATIENT)
Dept: INTERNAL MEDICINE | Facility: CLINIC | Age: 73
End: 2018-12-31

## 2019-01-04 ENCOUNTER — COMMUNICATION - HEALTHEAST (OUTPATIENT)
Dept: INTERNAL MEDICINE | Facility: CLINIC | Age: 74
End: 2019-01-04

## 2019-01-18 ENCOUNTER — COMMUNICATION - HEALTHEAST (OUTPATIENT)
Dept: NURSING | Facility: CLINIC | Age: 74
End: 2019-01-18

## 2019-01-22 ENCOUNTER — COMMUNICATION - HEALTHEAST (OUTPATIENT)
Dept: NURSING | Facility: CLINIC | Age: 74
End: 2019-01-22

## 2019-02-08 ENCOUNTER — COMMUNICATION - HEALTHEAST (OUTPATIENT)
Dept: NURSING | Facility: CLINIC | Age: 74
End: 2019-02-08

## 2019-02-14 ENCOUNTER — COMMUNICATION - HEALTHEAST (OUTPATIENT)
Dept: INTERNAL MEDICINE | Facility: CLINIC | Age: 74
End: 2019-02-14

## 2019-02-25 ENCOUNTER — COMMUNICATION - HEALTHEAST (OUTPATIENT)
Dept: NURSING | Facility: CLINIC | Age: 74
End: 2019-02-25

## 2019-02-27 ENCOUNTER — RECORDS - HEALTHEAST (OUTPATIENT)
Dept: RADIOLOGY | Facility: CLINIC | Age: 74
End: 2019-02-27

## 2019-02-27 ENCOUNTER — HOSPITAL ENCOUNTER (OUTPATIENT)
Dept: MAMMOGRAPHY | Facility: CLINIC | Age: 74
Discharge: HOME | End: 2019-02-27
Attending: INTERNAL MEDICINE

## 2019-02-27 DIAGNOSIS — Z12.31 VISIT FOR SCREENING MAMMOGRAM: ICD-10-CM

## 2019-03-01 ENCOUNTER — COMMUNICATION - HEALTHEAST (OUTPATIENT)
Dept: INTERNAL MEDICINE | Facility: CLINIC | Age: 74
End: 2019-03-01

## 2019-03-05 ENCOUNTER — AMBULATORY - HEALTHEAST (OUTPATIENT)
Dept: CARE COORDINATION | Facility: CLINIC | Age: 74
End: 2019-03-05

## 2019-03-07 ENCOUNTER — COMMUNICATION - HEALTHEAST (OUTPATIENT)
Dept: NURSING | Facility: CLINIC | Age: 74
End: 2019-03-07

## 2019-03-08 ENCOUNTER — COMMUNICATION - HEALTHEAST (OUTPATIENT)
Dept: NURSING | Facility: CLINIC | Age: 74
End: 2019-03-08

## 2019-03-08 ENCOUNTER — AMBULATORY - HEALTHEAST (OUTPATIENT)
Dept: CARE COORDINATION | Facility: CLINIC | Age: 74
End: 2019-03-08

## 2019-03-12 ENCOUNTER — OFFICE VISIT - HEALTHEAST (OUTPATIENT)
Dept: INTERNAL MEDICINE | Facility: CLINIC | Age: 74
End: 2019-03-12

## 2019-03-12 DIAGNOSIS — I10 ESSENTIAL HYPERTENSION: ICD-10-CM

## 2019-03-12 DIAGNOSIS — J32.9 RECURRENT SINUS INFECTIONS: ICD-10-CM

## 2019-03-12 DIAGNOSIS — E78.2 MIXED HYPERLIPIDEMIA: ICD-10-CM

## 2019-03-12 DIAGNOSIS — R22.2 MASS ON BACK: ICD-10-CM

## 2019-03-12 LAB
ALBUMIN SERPL-MCNC: 3.8 G/DL (ref 3.5–5)
ALP SERPL-CCNC: 72 U/L (ref 45–120)
ALT SERPL W P-5'-P-CCNC: 18 U/L (ref 0–45)
ANION GAP SERPL CALCULATED.3IONS-SCNC: 7 MMOL/L (ref 5–18)
AST SERPL W P-5'-P-CCNC: 22 U/L (ref 0–40)
BILIRUB SERPL-MCNC: 0.6 MG/DL (ref 0–1)
BUN SERPL-MCNC: 19 MG/DL (ref 8–28)
CALCIUM SERPL-MCNC: 10 MG/DL (ref 8.5–10.5)
CHLORIDE BLD-SCNC: 105 MMOL/L (ref 98–107)
CHOLEST SERPL-MCNC: 126 MG/DL
CO2 SERPL-SCNC: 29 MMOL/L (ref 22–31)
CREAT SERPL-MCNC: 0.77 MG/DL (ref 0.6–1.1)
FASTING STATUS PATIENT QL REPORTED: NORMAL
GFR SERPL CREATININE-BSD FRML MDRD: >60 ML/MIN/1.73M2
GLUCOSE BLD-MCNC: 87 MG/DL (ref 70–125)
HDLC SERPL-MCNC: 59 MG/DL
LDLC SERPL CALC-MCNC: 54 MG/DL
POTASSIUM BLD-SCNC: 4.1 MMOL/L (ref 3.5–5)
PROT SERPL-MCNC: 6.6 G/DL (ref 6–8)
SODIUM SERPL-SCNC: 141 MMOL/L (ref 136–145)
TRIGL SERPL-MCNC: 65 MG/DL

## 2019-03-12 ASSESSMENT — MIFFLIN-ST. JEOR: SCORE: 989.82

## 2019-03-13 ENCOUNTER — COMMUNICATION - HEALTHEAST (OUTPATIENT)
Dept: NURSING | Facility: CLINIC | Age: 74
End: 2019-03-13

## 2019-03-14 ENCOUNTER — RECORDS - HEALTHEAST (OUTPATIENT)
Dept: ADMINISTRATIVE | Facility: OTHER | Age: 74
End: 2019-03-14

## 2019-03-15 ENCOUNTER — COMMUNICATION - HEALTHEAST (OUTPATIENT)
Dept: INTERNAL MEDICINE | Facility: CLINIC | Age: 74
End: 2019-03-15

## 2019-03-24 ENCOUNTER — COMMUNICATION - HEALTHEAST (OUTPATIENT)
Dept: INTERNAL MEDICINE | Facility: CLINIC | Age: 74
End: 2019-03-24

## 2019-03-24 DIAGNOSIS — F34.1 DYSTHYMIC DISORDER: ICD-10-CM

## 2019-04-03 ENCOUNTER — HOSPITAL ENCOUNTER (OUTPATIENT)
Dept: ULTRASOUND IMAGING | Facility: CLINIC | Age: 74
Discharge: HOME | End: 2019-04-03
Attending: INTERNAL MEDICINE

## 2019-04-03 DIAGNOSIS — R22.2 MASS ON BACK: ICD-10-CM

## 2019-04-15 ENCOUNTER — COMMUNICATION - HEALTHEAST (OUTPATIENT)
Dept: INTERNAL MEDICINE | Facility: CLINIC | Age: 74
End: 2019-04-15

## 2019-05-09 ENCOUNTER — OFFICE VISIT - HEALTHEAST (OUTPATIENT)
Dept: FAMILY MEDICINE | Facility: CLINIC | Age: 74
End: 2019-05-09

## 2019-05-09 DIAGNOSIS — G25.0 BENIGN ESSENTIAL TREMOR: ICD-10-CM

## 2019-05-09 DIAGNOSIS — J47.9 BRONCHIECTASIS WITHOUT COMPLICATION (H): ICD-10-CM

## 2019-05-09 DIAGNOSIS — F34.1 DYSTHYMIC DISORDER: ICD-10-CM

## 2019-05-09 DIAGNOSIS — Z76.89 ENCOUNTER TO ESTABLISH CARE: ICD-10-CM

## 2019-05-09 DIAGNOSIS — I10 ESSENTIAL HYPERTENSION: ICD-10-CM

## 2019-05-09 DIAGNOSIS — N39.41 URGE INCONTINENCE: ICD-10-CM

## 2019-05-09 DIAGNOSIS — J45.40 MODERATE PERSISTENT ASTHMA WITHOUT COMPLICATION: ICD-10-CM

## 2019-05-09 DIAGNOSIS — J32.0 CHRONIC MAXILLARY SINUSITIS: ICD-10-CM

## 2019-06-05 ENCOUNTER — COMMUNICATION - HEALTHEAST (OUTPATIENT)
Dept: INTERNAL MEDICINE | Facility: CLINIC | Age: 74
End: 2019-06-05

## 2019-06-05 DIAGNOSIS — K21.9 GASTROESOPHAGEAL REFLUX DISEASE WITHOUT ESOPHAGITIS: ICD-10-CM

## 2019-06-17 ENCOUNTER — COMMUNICATION - HEALTHEAST (OUTPATIENT)
Dept: NURSING | Facility: CLINIC | Age: 74
End: 2019-06-17

## 2019-06-17 ENCOUNTER — AMBULATORY - HEALTHEAST (OUTPATIENT)
Dept: CARE COORDINATION | Facility: CLINIC | Age: 74
End: 2019-06-17

## 2019-06-21 ENCOUNTER — COMMUNICATION - HEALTHEAST (OUTPATIENT)
Dept: NURSING | Facility: CLINIC | Age: 74
End: 2019-06-21

## 2019-07-10 ENCOUNTER — OFFICE VISIT - HEALTHEAST (OUTPATIENT)
Dept: INTERNAL MEDICINE | Facility: CLINIC | Age: 74
End: 2019-07-10

## 2019-07-10 DIAGNOSIS — J01.01 ACUTE RECURRENT MAXILLARY SINUSITIS: ICD-10-CM

## 2019-07-19 ENCOUNTER — RECORDS - HEALTHEAST (OUTPATIENT)
Dept: ADMINISTRATIVE | Facility: OTHER | Age: 74
End: 2019-07-19

## 2019-08-08 ENCOUNTER — OFFICE VISIT - HEALTHEAST (OUTPATIENT)
Dept: INTERNAL MEDICINE | Facility: CLINIC | Age: 74
End: 2019-08-08

## 2019-08-08 DIAGNOSIS — R91.8 PULMONARY NODULES: ICD-10-CM

## 2019-08-08 DIAGNOSIS — J47.9 BRONCHIECTASIS WITHOUT COMPLICATION (H): ICD-10-CM

## 2019-08-08 DIAGNOSIS — J32.9 SINUSITIS, UNSPECIFIED CHRONICITY, UNSPECIFIED LOCATION: ICD-10-CM

## 2019-09-06 ENCOUNTER — COMMUNICATION - HEALTHEAST (OUTPATIENT)
Dept: INTERNAL MEDICINE | Facility: CLINIC | Age: 74
End: 2019-09-06

## 2019-09-06 DIAGNOSIS — I10 HTN (HYPERTENSION): ICD-10-CM

## 2019-10-22 ENCOUNTER — RECORDS - HEALTHEAST (OUTPATIENT)
Dept: ADMINISTRATIVE | Facility: OTHER | Age: 74
End: 2019-10-22

## 2019-11-01 ENCOUNTER — COMMUNICATION - HEALTHEAST (OUTPATIENT)
Dept: FAMILY MEDICINE | Facility: CLINIC | Age: 74
End: 2019-11-01

## 2019-11-01 DIAGNOSIS — J47.9 BRONCHIECTASIS WITHOUT COMPLICATION (H): ICD-10-CM

## 2019-11-26 ENCOUNTER — COMMUNICATION - HEALTHEAST (OUTPATIENT)
Dept: INTERNAL MEDICINE | Facility: CLINIC | Age: 74
End: 2019-11-26

## 2019-11-26 DIAGNOSIS — E78.00 HYPERCHOLESTEROLEMIA: ICD-10-CM

## 2020-01-21 ENCOUNTER — COMMUNICATION - HEALTHEAST (OUTPATIENT)
Dept: SCHEDULING | Facility: CLINIC | Age: 75
End: 2020-01-21

## 2020-01-22 ENCOUNTER — OFFICE VISIT - HEALTHEAST (OUTPATIENT)
Dept: FAMILY MEDICINE | Facility: CLINIC | Age: 75
End: 2020-01-22
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
## 2020-02-25 ENCOUNTER — RECORDS - HEALTHEAST (OUTPATIENT)
Dept: ADMINISTRATIVE | Facility: OTHER | Age: 75
End: 2020-02-25

## 2020-03-04 ENCOUNTER — OFFICE VISIT - HEALTHEAST (OUTPATIENT)
Dept: FAMILY MEDICINE | Facility: CLINIC | Age: 75
End: 2020-03-04

## 2020-03-04 ENCOUNTER — COMMUNICATION - HEALTHEAST (OUTPATIENT)
Dept: SCHEDULING | Facility: CLINIC | Age: 75
End: 2020-03-04

## 2020-03-04 DIAGNOSIS — J47.9 BRONCHIECTASIS WITHOUT COMPLICATION (H): ICD-10-CM

## 2020-03-04 DIAGNOSIS — J47.1 BRONCHIECTASIS WITH ACUTE EXACERBATION (H): ICD-10-CM

## 2020-03-04 DIAGNOSIS — J45.40 MODERATE PERSISTENT ASTHMA WITHOUT COMPLICATION: ICD-10-CM

## 2020-03-04 ASSESSMENT — ANXIETY QUESTIONNAIRES
6. BECOMING EASILY ANNOYED OR IRRITABLE: NOT AT ALL
GAD7 TOTAL SCORE: 2
2. NOT BEING ABLE TO STOP OR CONTROL WORRYING: NOT AT ALL
1. FEELING NERVOUS, ANXIOUS, OR ON EDGE: NOT AT ALL
4. TROUBLE RELAXING: SEVERAL DAYS
3. WORRYING TOO MUCH ABOUT DIFFERENT THINGS: SEVERAL DAYS
IF YOU CHECKED OFF ANY PROBLEMS ON THIS QUESTIONNAIRE, HOW DIFFICULT HAVE THESE PROBLEMS MADE IT FOR YOU TO DO YOUR WORK, TAKE CARE OF THINGS AT HOME, OR GET ALONG WITH OTHER PEOPLE: NOT DIFFICULT AT ALL
5. BEING SO RESTLESS THAT IT IS HARD TO SIT STILL: NOT AT ALL
7. FEELING AFRAID AS IF SOMETHING AWFUL MIGHT HAPPEN: NOT AT ALL

## 2020-03-04 ASSESSMENT — PATIENT HEALTH QUESTIONNAIRE - PHQ9: SUM OF ALL RESPONSES TO PHQ QUESTIONS 1-9: 2

## 2020-03-04 ASSESSMENT — MIFFLIN-ST. JEOR: SCORE: 973.49

## 2020-03-09 ENCOUNTER — COMMUNICATION - HEALTHEAST (OUTPATIENT)
Dept: FAMILY MEDICINE | Facility: CLINIC | Age: 75
End: 2020-03-09

## 2020-03-10 ENCOUNTER — COMMUNICATION - HEALTHEAST (OUTPATIENT)
Dept: FAMILY MEDICINE | Facility: CLINIC | Age: 75
End: 2020-03-10

## 2020-03-10 ENCOUNTER — COMMUNICATION - HEALTHEAST (OUTPATIENT)
Dept: SCHEDULING | Facility: CLINIC | Age: 75
End: 2020-03-10

## 2020-03-11 ENCOUNTER — OFFICE VISIT - HEALTHEAST (OUTPATIENT)
Dept: FAMILY MEDICINE | Facility: CLINIC | Age: 75
End: 2020-03-11

## 2020-03-11 ENCOUNTER — COMMUNICATION - HEALTHEAST (OUTPATIENT)
Dept: SCHEDULING | Facility: CLINIC | Age: 75
End: 2020-03-11

## 2020-03-11 DIAGNOSIS — R05.9 COUGH: ICD-10-CM

## 2020-03-16 ENCOUNTER — COMMUNICATION - HEALTHEAST (OUTPATIENT)
Dept: FAMILY MEDICINE | Facility: CLINIC | Age: 75
End: 2020-03-16

## 2020-03-16 ENCOUNTER — COMMUNICATION - HEALTHEAST (OUTPATIENT)
Dept: SCHEDULING | Facility: CLINIC | Age: 75
End: 2020-03-16

## 2020-03-16 DIAGNOSIS — J47.1 BRONCHIECTASIS WITH ACUTE EXACERBATION (H): ICD-10-CM

## 2020-03-18 ENCOUNTER — COMMUNICATION - HEALTHEAST (OUTPATIENT)
Dept: FAMILY MEDICINE | Facility: CLINIC | Age: 75
End: 2020-03-18

## 2020-03-18 DIAGNOSIS — F34.1 DYSTHYMIC DISORDER: ICD-10-CM

## 2020-03-19 ENCOUNTER — OFFICE VISIT - HEALTHEAST (OUTPATIENT)
Dept: FAMILY MEDICINE | Facility: CLINIC | Age: 75
End: 2020-03-19

## 2020-03-19 DIAGNOSIS — J01.90 ACUTE SINUSITIS WITH SYMPTOMS > 10 DAYS: ICD-10-CM

## 2020-03-19 DIAGNOSIS — J47.0 BRONCHIECTASIS WITH ACUTE LOWER RESPIRATORY INFECTION (H): ICD-10-CM

## 2020-03-23 ENCOUNTER — COMMUNICATION - HEALTHEAST (OUTPATIENT)
Dept: INTERNAL MEDICINE | Facility: CLINIC | Age: 75
End: 2020-03-23

## 2020-03-23 ENCOUNTER — COMMUNICATION - HEALTHEAST (OUTPATIENT)
Dept: FAMILY MEDICINE | Facility: CLINIC | Age: 75
End: 2020-03-23

## 2020-03-23 DIAGNOSIS — J01.90 ACUTE SINUSITIS WITH SYMPTOMS > 10 DAYS: ICD-10-CM

## 2020-03-23 DIAGNOSIS — K21.9 GASTROESOPHAGEAL REFLUX DISEASE WITHOUT ESOPHAGITIS: ICD-10-CM

## 2020-04-16 ENCOUNTER — COMMUNICATION - HEALTHEAST (OUTPATIENT)
Dept: INTERNAL MEDICINE | Facility: CLINIC | Age: 75
End: 2020-04-16

## 2020-04-16 DIAGNOSIS — J32.9 SINUSITIS, UNSPECIFIED CHRONICITY, UNSPECIFIED LOCATION: ICD-10-CM

## 2020-04-24 ENCOUNTER — COMMUNICATION - HEALTHEAST (OUTPATIENT)
Dept: SCHEDULING | Facility: CLINIC | Age: 75
End: 2020-04-24

## 2020-05-04 ENCOUNTER — COMMUNICATION - HEALTHEAST (OUTPATIENT)
Dept: SCHEDULING | Facility: CLINIC | Age: 75
End: 2020-05-04

## 2020-05-04 ENCOUNTER — COMMUNICATION - HEALTHEAST (OUTPATIENT)
Dept: FAMILY MEDICINE | Facility: CLINIC | Age: 75
End: 2020-05-04
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
## 2020-05-04 DIAGNOSIS — Z20.828 CONTACT WITH AND (SUSPECTED) EXPOSURE TO OTHER VIRAL COMMUNICABLE DISEASES: ICD-10-CM

## 2020-05-06 ENCOUNTER — COMMUNICATION - HEALTHEAST (OUTPATIENT)
Dept: FAMILY MEDICINE | Facility: CLINIC | Age: 75
End: 2020-05-06

## 2020-05-06 ENCOUNTER — AMBULATORY - HEALTHEAST (OUTPATIENT)
Dept: LAB | Facility: CLINIC | Age: 75
End: 2020-05-06

## 2020-05-06 DIAGNOSIS — Z20.828 CONTACT WITH AND (SUSPECTED) EXPOSURE TO OTHER VIRAL COMMUNICABLE DISEASES: ICD-10-CM

## 2020-05-08 ENCOUNTER — RECORDS - HEALTHEAST (OUTPATIENT)
Dept: ADMINISTRATIVE | Facility: OTHER | Age: 75
End: 2020-05-08

## 2020-05-08 ENCOUNTER — COMMUNICATION - HEALTHEAST (OUTPATIENT)
Dept: FAMILY MEDICINE | Facility: CLINIC | Age: 75
End: 2020-05-08

## 2020-06-01 ENCOUNTER — OFFICE VISIT - HEALTHEAST (OUTPATIENT)
Dept: FAMILY MEDICINE | Facility: CLINIC | Age: 75
End: 2020-06-01

## 2020-06-01 DIAGNOSIS — R22.32 AXILLARY LUMP, LEFT: ICD-10-CM

## 2020-06-15 ENCOUNTER — COMMUNICATION - HEALTHEAST (OUTPATIENT)
Dept: FAMILY MEDICINE | Facility: CLINIC | Age: 75
End: 2020-06-15

## 2020-06-15 DIAGNOSIS — N63.32 UNSPECIFIED LUMP IN AXILLARY TAIL OF THE LEFT BREAST: ICD-10-CM

## 2020-06-15 DIAGNOSIS — R22.32 AXILLARY LUMP, LEFT: ICD-10-CM

## 2020-06-15 DIAGNOSIS — F40.243 FEAR OF FLYING: ICD-10-CM

## 2020-06-15 DIAGNOSIS — K21.9 GASTROESOPHAGEAL REFLUX DISEASE WITHOUT ESOPHAGITIS: ICD-10-CM

## 2020-06-15 DIAGNOSIS — N63.0 LUMP OR MASS IN BREAST: ICD-10-CM

## 2020-06-16 ENCOUNTER — OFFICE VISIT - HEALTHEAST (OUTPATIENT)
Dept: FAMILY MEDICINE | Facility: CLINIC | Age: 75
End: 2020-06-16

## 2020-06-16 ENCOUNTER — COMMUNICATION - HEALTHEAST (OUTPATIENT)
Dept: FAMILY MEDICINE | Facility: CLINIC | Age: 75
End: 2020-06-16

## 2020-06-16 DIAGNOSIS — F40.243 FEAR OF FLYING: ICD-10-CM

## 2020-06-16 DIAGNOSIS — K21.9 GASTROESOPHAGEAL REFLUX DISEASE WITHOUT ESOPHAGITIS: ICD-10-CM

## 2020-06-16 ASSESSMENT — ANXIETY QUESTIONNAIRES
4. TROUBLE RELAXING: NOT AT ALL
GAD7 TOTAL SCORE: 2
2. NOT BEING ABLE TO STOP OR CONTROL WORRYING: SEVERAL DAYS
5. BEING SO RESTLESS THAT IT IS HARD TO SIT STILL: NOT AT ALL
3. WORRYING TOO MUCH ABOUT DIFFERENT THINGS: SEVERAL DAYS
6. BECOMING EASILY ANNOYED OR IRRITABLE: NOT AT ALL
7. FEELING AFRAID AS IF SOMETHING AWFUL MIGHT HAPPEN: NOT AT ALL
IF YOU CHECKED OFF ANY PROBLEMS ON THIS QUESTIONNAIRE, HOW DIFFICULT HAVE THESE PROBLEMS MADE IT FOR YOU TO DO YOUR WORK, TAKE CARE OF THINGS AT HOME, OR GET ALONG WITH OTHER PEOPLE: NOT DIFFICULT AT ALL
1. FEELING NERVOUS, ANXIOUS, OR ON EDGE: NOT AT ALL

## 2020-06-16 ASSESSMENT — PATIENT HEALTH QUESTIONNAIRE - PHQ9: SUM OF ALL RESPONSES TO PHQ QUESTIONS 1-9: 5

## 2020-06-17 ENCOUNTER — COMMUNICATION - HEALTHEAST (OUTPATIENT)
Dept: FAMILY MEDICINE | Facility: CLINIC | Age: 75
End: 2020-06-17

## 2020-06-17 DIAGNOSIS — N39.41 URGE INCONTINENCE: ICD-10-CM

## 2020-06-23 ENCOUNTER — COMMUNICATION - HEALTHEAST (OUTPATIENT)
Dept: FAMILY MEDICINE | Facility: CLINIC | Age: 75
End: 2020-06-23

## 2020-06-23 DIAGNOSIS — I10 HTN (HYPERTENSION): ICD-10-CM

## 2020-07-13 ENCOUNTER — HOSPITAL ENCOUNTER (OUTPATIENT)
Dept: ULTRASOUND IMAGING | Facility: CLINIC | Age: 75
Discharge: HOME | End: 2020-07-13
Attending: FAMILY MEDICINE

## 2020-07-13 ENCOUNTER — HOSPITAL ENCOUNTER (OUTPATIENT)
Dept: MAMMOGRAPHY | Facility: CLINIC | Age: 75
Discharge: HOME | End: 2020-07-13
Attending: FAMILY MEDICINE

## 2020-07-13 DIAGNOSIS — R22.32 AXILLARY LUMP, LEFT: ICD-10-CM

## 2020-07-13 DIAGNOSIS — N63.32 UNSPECIFIED LUMP IN AXILLARY TAIL OF THE LEFT BREAST: ICD-10-CM

## 2020-07-29 ENCOUNTER — COMMUNICATION - HEALTHEAST (OUTPATIENT)
Dept: FAMILY MEDICINE | Facility: CLINIC | Age: 75
End: 2020-07-29

## 2020-07-29 DIAGNOSIS — J47.9 BRONCHIECTASIS WITHOUT COMPLICATION (H): ICD-10-CM

## 2020-09-17 ENCOUNTER — COMMUNICATION - HEALTHEAST (OUTPATIENT)
Dept: FAMILY MEDICINE | Facility: CLINIC | Age: 75
End: 2020-09-17

## 2020-09-17 DIAGNOSIS — J47.9 BRONCHIECTASIS WITHOUT COMPLICATION (H): ICD-10-CM

## 2020-09-27 ENCOUNTER — COMMUNICATION - HEALTHEAST (OUTPATIENT)
Dept: FAMILY MEDICINE | Facility: CLINIC | Age: 75
End: 2020-09-27

## 2020-09-27 DIAGNOSIS — E78.00 HYPERCHOLESTEROLEMIA: ICD-10-CM

## 2020-10-19 ENCOUNTER — COMMUNICATION - HEALTHEAST (OUTPATIENT)
Dept: FAMILY MEDICINE | Facility: CLINIC | Age: 75
End: 2020-10-19

## 2020-10-24 NOTE — TELEPHONE ENCOUNTER
FUTURE VISIT INFORMATION      FUTURE VISIT INFORMATION:    Date: 11.5.20    Time: 3:15    [place] [facility] [place]  REFERRAL INFORMATION:    Referring provider:  N/A    Referring providers clinic:  N/A    Reason for visit/diagnosis  new Pt for alopecia - per Pt    RECORDS REQUESTED FROM:       Clinic name Comments Records Status Imaging Status    No previous records for hairloss

## 2020-11-04 ENCOUNTER — RECORDS - HEALTHEAST (OUTPATIENT)
Dept: ADMINISTRATIVE | Facility: OTHER | Age: 75
End: 2020-11-04

## 2020-11-05 ENCOUNTER — PRE VISIT (OUTPATIENT)
Dept: DERMATOLOGY | Facility: CLINIC | Age: 75
End: 2020-11-05

## 2020-11-05 ENCOUNTER — OFFICE VISIT (OUTPATIENT)
Dept: DERMATOLOGY | Facility: CLINIC | Age: 75
End: 2020-11-05
Payer: MEDICARE

## 2020-11-05 DIAGNOSIS — E55.9 VITAMIN D DEFICIENCY: ICD-10-CM

## 2020-11-05 DIAGNOSIS — L65.9 NON-SCARRING ALOPECIA: Primary | ICD-10-CM

## 2020-11-05 DIAGNOSIS — L65.9 NON-SCARRING ALOPECIA: ICD-10-CM

## 2020-11-05 PROBLEM — K21.9 GERD (GASTROESOPHAGEAL REFLUX DISEASE): Status: ACTIVE | Noted: 2020-11-05

## 2020-11-05 PROBLEM — R91.8 PULMONARY NODULES: Status: ACTIVE | Noted: 2019-08-08

## 2020-11-05 PROBLEM — G25.0 BENIGN ESSENTIAL TREMOR: Status: ACTIVE | Noted: 2019-05-09

## 2020-11-05 PROBLEM — J45.40 MODERATE PERSISTENT ASTHMA: Status: ACTIVE | Noted: 2020-11-05

## 2020-11-05 PROBLEM — J84.10 POSTINFLAMMATORY PULMONARY FIBROSIS (H): Status: ACTIVE | Noted: 2020-11-05

## 2020-11-05 LAB
BASOPHILS # BLD AUTO: 0 10E9/L (ref 0–0.2)
BASOPHILS NFR BLD AUTO: 0.1 %
DIFFERENTIAL METHOD BLD: ABNORMAL
EOSINOPHIL # BLD AUTO: 0 10E9/L (ref 0–0.7)
EOSINOPHIL NFR BLD AUTO: 0 %
ERYTHROCYTE [DISTWIDTH] IN BLOOD BY AUTOMATED COUNT: 14.1 % (ref 10–15)
FERRITIN SERPL-MCNC: 71 NG/ML (ref 8–252)
HCT VFR BLD AUTO: 39.2 % (ref 35–47)
HGB BLD-MCNC: 12.6 G/DL (ref 11.7–15.7)
IMM GRANULOCYTES # BLD: 0.1 10E9/L (ref 0–0.4)
IMM GRANULOCYTES NFR BLD: 0.5 %
IRON SATN MFR SERPL: 12 % (ref 15–46)
IRON SERPL-MCNC: 40 UG/DL (ref 35–180)
LYMPHOCYTES # BLD AUTO: 2.2 10E9/L (ref 0.8–5.3)
LYMPHOCYTES NFR BLD AUTO: 10.3 %
MCH RBC QN AUTO: 30.7 PG (ref 26.5–33)
MCHC RBC AUTO-ENTMCNC: 32.1 G/DL (ref 31.5–36.5)
MCV RBC AUTO: 95 FL (ref 78–100)
MONOCYTES # BLD AUTO: 1 10E9/L (ref 0–1.3)
MONOCYTES NFR BLD AUTO: 4.7 %
NEUTROPHILS # BLD AUTO: 17.8 10E9/L (ref 1.6–8.3)
NEUTROPHILS NFR BLD AUTO: 84.4 %
NRBC # BLD AUTO: 0 10*3/UL
NRBC BLD AUTO-RTO: 0 /100
PLATELET # BLD AUTO: 340 10E9/L (ref 150–450)
RBC # BLD AUTO: 4.11 10E12/L (ref 3.8–5.2)
TIBC SERPL-MCNC: 336 UG/DL (ref 240–430)
TSH SERPL DL<=0.005 MIU/L-ACNC: 0.76 MU/L (ref 0.4–4)
WBC # BLD AUTO: 21 10E9/L (ref 4–11)

## 2020-11-05 PROCEDURE — 99000 SPECIMEN HANDLING OFFICE-LAB: CPT | Performed by: PATHOLOGY

## 2020-11-05 PROCEDURE — 85025 COMPLETE CBC W/AUTO DIFF WBC: CPT | Performed by: PATHOLOGY

## 2020-11-05 PROCEDURE — 84443 ASSAY THYROID STIM HORMONE: CPT | Performed by: PATHOLOGY

## 2020-11-05 PROCEDURE — 36415 COLL VENOUS BLD VENIPUNCTURE: CPT | Performed by: PATHOLOGY

## 2020-11-05 PROCEDURE — 83550 IRON BINDING TEST: CPT | Performed by: PATHOLOGY

## 2020-11-05 PROCEDURE — 99203 OFFICE O/P NEW LOW 30 MIN: CPT | Mod: GC | Performed by: DERMATOLOGY

## 2020-11-05 PROCEDURE — 83540 ASSAY OF IRON: CPT | Performed by: PATHOLOGY

## 2020-11-05 PROCEDURE — 82728 ASSAY OF FERRITIN: CPT | Performed by: PATHOLOGY

## 2020-11-05 RX ORDER — IRBESARTAN 150 MG/1
TABLET ORAL EVERY 24 HOURS
COMMUNITY
Start: 2020-06-23

## 2020-11-05 RX ORDER — ALBUTEROL SULFATE 90 UG/1
1-2 AEROSOL, METERED RESPIRATORY (INHALATION)
COMMUNITY
Start: 2020-03-11

## 2020-11-05 RX ORDER — OXYBUTYNIN CHLORIDE 5 MG/1
TABLET, EXTENDED RELEASE ORAL
COMMUNITY
Start: 2020-06-18

## 2020-11-05 RX ORDER — BUDESONIDE AND FORMOTEROL FUMARATE DIHYDRATE 80; 4.5 UG/1; UG/1
AEROSOL RESPIRATORY (INHALATION)
COMMUNITY
Start: 2020-09-18

## 2020-11-05 RX ORDER — FERROUS SULFATE 325(65) MG
TABLET ORAL EVERY 24 HOURS
COMMUNITY

## 2020-11-05 RX ORDER — ALPRAZOLAM 0.5 MG
TABLET ORAL
COMMUNITY
Start: 2020-06-16

## 2020-11-05 RX ORDER — ATORVASTATIN CALCIUM 40 MG/1
TABLET, FILM COATED ORAL EVERY 24 HOURS
COMMUNITY
Start: 2020-09-30

## 2020-11-05 RX ORDER — DOXYCYCLINE 100 MG/1
CAPSULE ORAL
COMMUNITY

## 2020-11-05 RX ORDER — FLUTICASONE PROPIONATE 50 MCG
SPRAY, SUSPENSION (ML) NASAL
COMMUNITY
Start: 2020-04-17

## 2020-11-05 RX ORDER — ESCITALOPRAM OXALATE 20 MG/1
TABLET ORAL
COMMUNITY
Start: 2020-03-19

## 2020-11-05 RX ORDER — OMEPRAZOLE 40 MG/1
CAPSULE, DELAYED RELEASE ORAL
COMMUNITY
Start: 2020-06-16

## 2020-11-05 ASSESSMENT — PAIN SCALES - GENERAL: PAINLEVEL: NO PAIN (0)

## 2020-11-05 NOTE — PATIENT INSTRUCTIONS
Thank you for coming in to be seen today!    There are two different types of hair loss (alopecia) that could be going on - both genetic hair loss and telogen effluvium.    We will have you start using minoxidil (Rogaine) 5% twice daily. You can buy this over the counter. Please note that in the first month of treatment, you may see increased hair loss; this should improve with continued use.     We will check some blood work today to evaluate for other causes of hair loss including vitamin deficiency. We will reach out to you once those results are available.

## 2020-11-05 NOTE — LETTER
Date:November 6, 2020      Patient was self referred, no letter generated. Do not send.        [facility] Information

## 2020-11-05 NOTE — NURSING NOTE
Dermatology Rooming Note    Natalie Walters's goals for this visit include:   Chief Complaint   Patient presents with     Hair Loss     Natalie is here today to be seen for hair loss.      MALAIKA Allen

## 2020-11-05 NOTE — LETTER
11/5/2020       RE: Natalie Walters  [address] [facility] [address]     Dear Colleague,    Thank you for referring your patient, Natalie Walters, to the [facility] [place] at [facility]. Please see a copy of my visit note below.    [facility] Dermatology Note      Dermatology Problem List:  1.Non-scarring alopecia  - Begin minoxidil 5% topical foam BID   - Labs pending    Encounter Date: Nov 5, 2020    CC:   Chief Complaint   Patient presents with     Hair Loss     Natalie is here today to be seen for hair loss.        History of Present Illness:  Ms. Natalie Walters is a 75 year old female who presents in self referral for hair loss. She has noticed progressive thinning of her hair over the past 12-18 months.  She has no prior history of hair loss or hair thinning. She has had increased stress with the unexpected death of her  1 year ago.     The hair loss is most prominent along bilateral temples. No other focal patches of hair loss. She has very occasional pruritis of the scalp with a small amount of dandruff. No bleeding or pain. No hair loss or decrease hair density on other parts of her body. She is currently using Pantene shampoo/conditioner and Licha hair styling products. She does dye her hair every 7 weeks.    She has a history of seborrheic keratoses on the face and shoulders. She has had 4 BCCs and 1 SCC removed via Mohs micrographic surgery.    Past Medical History:   Patient Active Problem List   Diagnosis     Essential hypertension     Postinflammatory pulmonary fibrosis (H)     Pulmonary nodules     Moderate persistent asthma     GERD (gastroesophageal reflux disease)     Bronchiectasis (H)     Benign essential tremor     Anemia, unspecified     History reviewed. No pertinent past medical history.  Past Surgical History:   Procedure Laterality Date     C TOTAL KNEE ARTHROPLASTY Right      Social  History:  Patient reports that she has never smoked. She has never used smokeless tobacco.    Family History:  History reviewed. No pertinent family history.    Medications:  Current Outpatient Medications   Medication Sig Dispense Refill     albuterol (PROAIR HFA/PROVENTIL HFA/VENTOLIN HFA) 108 (90 Base) MCG/ACT inhaler Inhale 1-2 puffs into the lungs       ALPRAZolam (XANAX) 0.5 MG tablet alprazolam 0.5 mg tablet       atorvastatin (LIPITOR) 40 MG tablet every 24 hours       budesonide-formoterol (SYMBICORT) 80-4.5 MCG/ACT Inhaler INHALE 2 PUFFS BY MOUTH TWICE DAILY. Please schedule annual wellness visit/med check before further refills if agreeable, or consideration for virtual visit/med check.       cholecalciferol 25 MCG (1000 UT) TABS Take 1,000 Units by mouth       escitalopram (LEXAPRO) 20 MG tablet escitalopram 20 mg tablet       ferrous sulfate (FEROSUL) 325 (65 Fe) MG tablet every 24 hours       fluticasone (FLONASE) 50 MCG/ACT nasal spray fluticasone propionate 50 mcg/actuation nasal spray,suspension       irbesartan (AVAPRO) 150 MG tablet every 24 hours       omeprazole (PRILOSEC) 40 MG DR capsule omeprazole 40 mg capsule,delayed release       oxybutynin ER (DITROPAN-XL) 5 MG 24 hr tablet oxybutynin chloride ER 5 mg tablet,extended release 24 hr       doxycycline monohydrate (MONODOX) 100 MG capsule doxycycline monohydrate 100 mg capsule        Allergies   Allergen Reactions     Animal Dander      Iodine      Pt. Reports rash with topical iodine.       No Clinical Screening - See Comments      Trees, grasses, ragweed.     Review of Systems:  -As per HPI  -Constitutional: Otherwise feeling well today, in usual state of health.  -HEENT: Patient denies nonhealing oral sores.  -Skin: As above in HPI. No additional skin concerns.    Physical exam:  Vitals: There were no vitals taken for this visit.  GEN: This is a well developed, well-nourished female in no acute distress, in a pleasant mood.    SKIN:  Focused examination of the scalp, face was performed.  -Benoit skin type: I  There is decreased hair density along bilateral temples. Hair pull test is positive, especially on the right parietal/occipital scalp. No perifollicular erythema. Mild scaling of the scalp.The eyebrows and eyelashes have normal density, and nails are within normal limits. No pitting or onycholysis.  -There are waxy stuck on light tan papules on the forehead.  -No other lesions of concern on areas examined.     Impression/Plan:    1. Non-scarring alopecia: Suspect combination of genetic female-pattern hair loss and telogen effluvium related to significant life stressors over past year. Will evaluate for possible other causes of hair loss with screening labs as below.  - Labs: CBC, CMP, Vit D, TSH, TPO, Iron studies (ferritin, iron)  - Photodocumentation  - Begin minoxidil 5% foam BID to scalp. Counseled on risk of hair growth in unintended areas (I.e. forehead) and that over the first few weeks of use patients can see an increase in hair shedding.     Follow-up in 6 months, earlier for new or changing lesions.     Dr. Escobar staffed the patient.    Staff Involved:  Resident(Ria Fay MD PGY-3 Family Medicine)/Staff    Staff Physician Comments:   I saw and evaluated the patient with the resident and I agree with the assessment and plan.  I was present for the examination.    Héctor Escobar MD  Pronouns: he/him/his    Department of Dermatology  [facility]: Phone: [phone], Fax:[phone]  [facility]: Phone: [phone] Fax: [phone]               Again, thank you for allowing me to participate in the care of your patient.      Sincerely,    Héctor Escobar MD

## 2020-11-05 NOTE — PROGRESS NOTES
[facility] Dermatology Note      Dermatology Problem List:  1.Non-scarring alopecia  - Begin minoxidil 5% topical foam BID   - Labs pending    Encounter Date: Nov 5, 2020    CC:   Chief Complaint   Patient presents with     Hair Loss     Natalie is here today to be seen for hair loss.        History of Present Illness:  Ms. Natalie Walters is a 75 year old female who presents in self referral for hair loss. She has noticed progressive thinning of her hair over the past 12-18 months.  She has no prior history of hair loss or hair thinning. She has had increased stress with the unexpected death of her  1 year ago.     The hair loss is most prominent along bilateral temples. No other focal patches of hair loss. She has very occasional pruritis of the scalp with a small amount of dandruff. No bleeding or pain. No hair loss or decrease hair density on other parts of her body. She is currently using Pantene shampoo/conditioner and Licha hair styling products. She does dye her hair every 7 weeks.    She has a history of seborrheic keratoses on the face and shoulders. She has had 4 BCCs and 1 SCC removed via Mohs micrographic surgery.    Past Medical History:   Patient Active Problem List   Diagnosis     Essential hypertension     Postinflammatory pulmonary fibrosis (H)     Pulmonary nodules     Moderate persistent asthma     GERD (gastroesophageal reflux disease)     Bronchiectasis (H)     Benign essential tremor     Anemia, unspecified     History reviewed. No pertinent past medical history.  Past Surgical History:   Procedure Laterality Date     C TOTAL KNEE ARTHROPLASTY Right      Social History:  Patient reports that she has never smoked. She has never used smokeless tobacco.    Family History:  History reviewed. No pertinent family history.    Medications:  Current Outpatient Medications   Medication Sig Dispense Refill     albuterol (PROAIR HFA/PROVENTIL HFA/VENTOLIN HFA) 108 (90 Base)  MCG/ACT inhaler Inhale 1-2 puffs into the lungs       ALPRAZolam (XANAX) 0.5 MG tablet alprazolam 0.5 mg tablet       atorvastatin (LIPITOR) 40 MG tablet every 24 hours       budesonide-formoterol (SYMBICORT) 80-4.5 MCG/ACT Inhaler INHALE 2 PUFFS BY MOUTH TWICE DAILY. Please schedule annual wellness visit/med check before further refills if agreeable, or consideration for virtual visit/med check.       cholecalciferol 25 MCG (1000 UT) TABS Take 1,000 Units by mouth       escitalopram (LEXAPRO) 20 MG tablet escitalopram 20 mg tablet       ferrous sulfate (FEROSUL) 325 (65 Fe) MG tablet every 24 hours       fluticasone (FLONASE) 50 MCG/ACT nasal spray fluticasone propionate 50 mcg/actuation nasal spray,suspension       irbesartan (AVAPRO) 150 MG tablet every 24 hours       omeprazole (PRILOSEC) 40 MG DR capsule omeprazole 40 mg capsule,delayed release       oxybutynin ER (DITROPAN-XL) 5 MG 24 hr tablet oxybutynin chloride ER 5 mg tablet,extended release 24 hr       doxycycline monohydrate (MONODOX) 100 MG capsule doxycycline monohydrate 100 mg capsule        Allergies   Allergen Reactions     Animal Dander      Iodine      Pt. Reports rash with topical iodine.       No Clinical Screening - See Comments      Trees, grasses, ragweed.     Review of Systems:  -As per HPI  -Constitutional: Otherwise feeling well today, in usual state of health.  -HEENT: Patient denies nonhealing oral sores.  -Skin: As above in HPI. No additional skin concerns.    Physical exam:  Vitals: There were no vitals taken for this visit.  GEN: This is a well developed, well-nourished female in no acute distress, in a pleasant mood.    SKIN: Focused examination of the scalp, face was performed.  -Benoit skin type: I  There is decreased hair density along bilateral temples. Hair pull test is positive, especially on the right parietal/occipital scalp. No perifollicular erythema. Mild scaling of the scalp.The eyebrows and eyelashes have normal  density, and nails are within normal limits. No pitting or onycholysis.  -There are waxy stuck on light tan papules on the forehead.  -No other lesions of concern on areas examined.     Impression/Plan:    1. Non-scarring alopecia: Suspect combination of genetic female-pattern hair loss and telogen effluvium related to significant life stressors over past year. Will evaluate for possible other causes of hair loss with screening labs as below.  - Labs: CBC, CMP, Vit D, TSH, TPO, Iron studies (ferritin, iron)  - Photodocumentation  - Begin minoxidil 5% foam BID to scalp. Counseled on risk of hair growth in unintended areas (I.e. forehead) and that over the first few weeks of use patients can see an increase in hair shedding.     Follow-up in 6 months, earlier for new or changing lesions.     Dr. Escobar staffed the patient.    Staff Involved:  Resident(Ria Fay MD PGY-3 Family Medicine)/Staff    Staff Physician Comments:   I saw and evaluated the patient with the resident and I agree with the assessment and plan.  I was present for the examination.    Héctor Escobar MD  Pronouns: he/him/his    Department of Dermatology  [facility]: Phone: [phone], Fax:[phone]  [facility]: Phone: [phone] Fax: [phone]

## 2020-11-06 LAB
DEPRECATED CALCIDIOL+CALCIFEROL SERPL-MC: 50 UG/L (ref 20–75)
THYROPEROXIDASE AB SERPL-ACNC: 528 IU/ML

## 2020-11-09 DIAGNOSIS — E06.3 HASHIMOTO'S DISEASE: ICD-10-CM

## 2020-11-09 DIAGNOSIS — R76.8 ANTI-TPO ANTIBODIES PRESENT: ICD-10-CM

## 2020-11-09 DIAGNOSIS — D72.828 NEUTROPHILIA: Primary | ICD-10-CM

## 2020-11-09 NOTE — RESULT ENCOUNTER NOTE
Can we call patient and let her know the following.     Her labs were normal except:    (1) Her WBC count was elevated. This can be due to a variety of causes including recent infection, medications, versus many other causes. I would like to repeat this lab and also have our pathologists review her blood smear to make sure nothing else is abnormal. I placed ordered for this     (2) Her blood test did demonstrate thyroid antibodies, which can be seen in autoimmune thyroid disease. Many people develop these antibodies without actually developing thyroid disease, but these can be indicators she may be more at risk for developing thyroid disease in the future. Her thyroid levels seemed normal which is good, though I'd like to check one additional lab test to make sure her thyroid function is normal.     Can you schedule for a lab visit within the next 1-2 weeks for above labs. Then follow-up in May as planned.     Thanks!    Héctor Escobar MD  Pronouns: he/him/his    Department of Dermatology  [facility]: Phone: [phone], Fax:[phone]  [facility]: Phone: [phone] Fax: [phone]

## 2020-11-18 ENCOUNTER — COMMUNICATION - HEALTHEAST (OUTPATIENT)
Dept: FAMILY MEDICINE | Facility: CLINIC | Age: 75
End: 2020-11-18

## 2020-11-18 ENCOUNTER — RECORDS - HEALTHEAST (OUTPATIENT)
Dept: FAMILY MEDICINE | Facility: CLINIC | Age: 75
End: 2020-11-18

## 2020-11-18 DIAGNOSIS — J32.9 SINUSITIS, UNSPECIFIED CHRONICITY, UNSPECIFIED LOCATION: ICD-10-CM

## 2020-11-18 DIAGNOSIS — J47.9 BRONCHIECTASIS WITHOUT COMPLICATION (H): ICD-10-CM

## 2020-11-18 DIAGNOSIS — K21.9 GASTROESOPHAGEAL REFLUX DISEASE WITHOUT ESOPHAGITIS: ICD-10-CM

## 2020-11-18 DIAGNOSIS — N39.41 URGE INCONTINENCE: ICD-10-CM

## 2020-11-30 ENCOUNTER — TELEPHONE (OUTPATIENT)
Dept: DERMATOLOGY | Facility: CLINIC | Age: 75
End: 2020-11-30

## 2020-11-30 NOTE — TELEPHONE ENCOUNTER
[facility] Call Center    Phone Message    May a detailed message be left on voicemail: yes     Reason for Call: Other: Pt called and said that she had a missed call from our clinic regarding some testing results. Pt would like a call back to discuss. Thanks     Action Taken: Message routed to:  [facility] ([facility] DERM    Travel Screening: Not Applicable

## 2020-11-30 NOTE — TELEPHONE ENCOUNTER
Talked to Natalie gave results to her. Natalie's  has recently passed away and she has moved to [place]. I sent her a link to sign up for [employer]. I will release her labs to her mychart and patient is going to look for a doctor in [place].    MALAIKA Schilling

## 2020-11-30 NOTE — TELEPHONE ENCOUNTER
Left a voicemail on her phone to call back to discuss lab results from 11/05. Clinic number provided to call back.    Bridger Melendez, CMA

## 2020-12-01 NOTE — TELEPHONE ENCOUNTER
Pt DIL called. States she needs to have a call back regarding results. Please call her to discuss. States she needs to help pt understand results. Pt is with her..

## 2020-12-01 NOTE — TELEPHONE ENCOUNTER
I called and left a VM asking for Natalie to give us a call back to discuss results.    MALAIKA Jorge

## 2020-12-03 NOTE — TELEPHONE ENCOUNTER
I called and left a VM asking for Natalie to give is a call back.    Candelaria Dominguez, ALEJANDROA

## 2021-01-14 ENCOUNTER — HEALTH MAINTENANCE LETTER (OUTPATIENT)
Age: 76
End: 2021-01-14

## 2021-05-27 ASSESSMENT — PATIENT HEALTH QUESTIONNAIRE - PHQ9
SUM OF ALL RESPONSES TO PHQ QUESTIONS 1-9: 5
SUM OF ALL RESPONSES TO PHQ QUESTIONS 1-9: 2

## 2021-05-27 NOTE — TELEPHONE ENCOUNTER
Refill Approved    Rx renewed per Medication Renewal Policy. Medication was last renewed on 8/26/18.    Rebecca Christopher, [facility] Triage/Med Refill 3/25/2019     Requested Prescriptions   Pending Prescriptions Disp Refills     escitalopram oxalate (LEXAPRO) 20 MG tablet [Pharmacy Med Name: ESCITALOPRAM 20 MG TABLET] 90 tablet 1     Sig: TAKE 1 TABLET (20 MG TOTAL) BY MOUTH DAILY.    [facility] Refill Protocol  Passed - 3/24/2019  3:59 PM       Passed - PCP or prescribing provider visit in last year    Last office visit with prescriber/PCP: 3/12/2019 Jocelin Kerr MD OR same dept: 3/12/2019 Jocelin Kerr MD OR same specialty: 3/12/2019 Jocelin Kerr MD  Last physical: 2/7/2018 Last MTM visit: Visit date not found   Next visit within 3 mo: Visit date not found  Next physical within 3 mo: Visit date not found  Prescriber OR PCP: Jocelin Kerr MD  Last diagnosis associated with med order: 1. Dysthymic disorder  - escitalopram oxalate (LEXAPRO) 20 MG tablet [Pharmacy Med Name: ESCITALOPRAM 20 MG TABLET]; TAKE 1 TABLET (20 MG TOTAL) BY MOUTH DAILY.  Dispense: 90 tablet; Refill: 1    If protocol passes may refill for 12 months if within 3 months of last provider visit (or a total of 15 months).

## 2021-05-27 NOTE — TELEPHONE ENCOUNTER
New Appointment Needed  What is the reason for the visit:    Establish care/med check  Provider Preference: Dr. Montero  How soon do you need to be seen?: next available. Epic shows she is taking establish care patients but it does not allow scheduling   Waitlist offered?: No  Okay to leave a detailed message:  Yes

## 2021-05-28 ASSESSMENT — ASTHMA QUESTIONNAIRES: ACT_TOTALSCORE: 25

## 2021-05-28 ASSESSMENT — ANXIETY QUESTIONNAIRES
GAD7 TOTAL SCORE: 2
GAD7 TOTAL SCORE: 2

## 2021-05-28 NOTE — PROGRESS NOTES
Assessment/plan   Natalie Walters is a 74 y.o. female who is new to my practice.    Natalie was seen today for establish care, sinus problem and follow-up.    Diagnoses and all orders for this visit:    Encounter to establish care    Chronic maxillary sinusitis  -     amoxicillin-clavulanate (AUGMENTIN) 875-125 mg per tablet; Take 1 tablet by mouth 2 (two) times a day for 7 days.    Bronchiectasis without complication (H)    Moderate persistent asthma without complication    Urge incontinence  -     oxybutynin (DITROPAN XL) 5 MG ER tablet; Take 1 tablet (5 mg total) by mouth daily.    Hypertension    Dysthymic disorder        Long conversation today regarding her sinus infection history.  She is going to consider following back up with her [facility] physicians for reevaluation.  She felt she may need antibiotics up to 6 times per year, but in the past year appears only 2-3 times.   Due to her history of bronchiectasis and pneumonia, I did feel comfortable providing a pocket prescription for her of Augmentin so that she would have this made available.  She understands risk for antibiotic resistance and appropriate use generally is deferred until symptoms are 10 days in nature.     Reviewed options for her urge incontinence.  Previously seeing gynecology though they recommended she can follow-up with her primary going forward.  Myrbetriq has been less effective than tolterodine.  For insurance formulary purposes she does think her oxybutynin was listed as a covered alternative and we discussed side effect profile of this.  She is willing to start a trial of oxybutynin.      I spent 40 minutes with the patient, >50% of which was in counseling regarding the patient's medical issues as noted above.    Follow up: 4 months    Kayla Gomez MD    Subjective:      HPI: Natalie Walters is a 74 y.o. female who is here for:    Chief Complaint   Patient presents with     Establish Care     Sinus Problem     has had bone  "removed for chronic sinus infections and still gets them     Follow-up     would like to discuss med currently on for incontinence     Reviewed her pertinent note medical conditions today.    HTN: On irbesartan 150mg daily. BP looks great today.  Of note her chart indicated she was on lisinopril 10 mg, however patient states she does not recall this medication she is not taking this.  I did remove this from her med list which would be appropriate since she is already on an ARB.    Urge incontinance: likes tolterodine previously (was on long acting 4mg once daily), but no longer covered by insurance. Has been on Myrbetriq for about 3 months. Feels the Myrbetriq is not as effective as the tolterodine. Prescribed by her gynecologist.  Has had two children.     She wanted to express frustration with not being able to call in to get antibiotics when she has a sinus infection.  She reports a long history of recurrent sinus infections.  She's had surgery at [facility].  She's been told that if she continues having sinus infections w/ drainage, she will continue to have bronchiectasis.      H/o sinus infections. Has had sinus surgery before in the past with [facility] around 2014. She previously had frequent bronchiectasis and PNAs due to these recurrent sinus infections.She's been told that if she continues having sinus infections w/ drainage, she will continue to have bronchiectasis.  She gets 2-6 sinus infections per year. She feels it's unusual because she doesn't get HA or fever; but gets yellow/green mucus and right maxillary facial pain with fatigue. Has always had Augmentin for this in the past if her sx are going on for 4-5 days.      She does use a \"smart vest\" for her h/o bronchiectasis. Used this for several years.   She has a history of moderate persistent asthma, ACT of 25 today.  On Symbicort twice a day.  Also using Flonase daily.      She has a pocket Rx for augementin from Dr. Kerr. Has been having sx for 7-8 days " "and decided she will start this abx today.       Review of Systems:  Mild essential tremor, has seen neurology at [facility]. Also has White matter \"burden\" in her brain, verbal skills in 90%ile. Harder to multitask. \"Exceutive inefficiencies\". States she has the atorvastatin for this.   + some depression managed well; her  is slowly dying of CHF and she is caregiver.   12 point comprehensive review of systems was negative except as noted and HPI     Social History:  Social History     Social History Narrative    ; her  has end stage CHF and she is primary caregiver.     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    Has two children; has 6 grandchildren.     Nonsmoker.     Kayla Gomez MD       Medical History:  Patient Active Problem List   Diagnosis     Dysthymic disorder     GERD (gastroesophageal reflux disease)     Hypertension     Moderate persistent asthma     Osteopenia with high risk of fracture     S/P total knee arthroplasty     Bronchiectasis (H)     Diverticulosis of large intestine without perforation or abscess without bleeding     Urge incontinence     Past Medical History:   Diagnosis Date     Asthma      Chronic cough      Essential tremor      Hypertension      Pneumonia 08/10/2014    pt states this is 5th occurrence     Recurrent pneumonia 6/18/2012     Past Surgical History:   Procedure Laterality Date     PARATHYROID GLAND SURGERY      gland removed with benign tumor     ID NASAL/SINUS ENDOSCOPY,OPEN MAXILL SINUS      Description: Nasal Endoscopy With Maxillary Antrostomy;  Recorded: 08/20/2013;     ID REMOVE TONSILS/ADENOIDS,<13 Y/O      Description: Tonsillectomy With Adenoidectomy;  Recorded: 08/20/2013;     ID REVISION OF CERVIX,NON OBSTETRICAL      Description: Cervical Cerclage (Non-OB);  Recorded: 08/20/2013;     REPLACEMENT TOTAL KNEE Right 3/1/2018    Procedure: RIGHT TOTAL KNEE ARTHROPLASTY COMPUTER ASSIST;  Surgeon: Sorin Lovell MD;  Location: " [facility];  Service:      SINUS SURGERY  05/2015    [facility]; 3 hr sugery, bones removed     Cat dander; Iodine; and Other drug allergy (see comments)  Family History   Problem Relation Age of Onset     Pancreatic cancer Mother      Arthritis Brother      Breast cancer Maternal Grandmother      Heart attack Paternal Grandfather      Colon cancer Neg Hx        Medications:  Current Outpatient Medications   Medication Sig Dispense Refill     albuterol (PROAIR HFA) 90 mcg/actuation inhaler Inhale 1-2 puffs.       atorvastatin (LIPITOR) 40 MG tablet Take 1 tablet (40 mg total) by mouth daily. 90 tablet 3     cholecalciferol, vitamin D3, (CHOLECALCIFEROL) 1,000 unit tablet Take 1,000 Units by mouth daily.       escitalopram oxalate (LEXAPRO) 20 MG tablet TAKE 1 TABLET (20 MG TOTAL) BY MOUTH DAILY. 90 tablet 3     ferrous sulfate 65 mg elemental iron Take 1 tablet by mouth 2 (two) times a week. Tuesdays and Fridays       fluticasone (FLONASE) 50 mcg/actuation nasal spray TAKE 2 PUFFS EACH NOSTRIL EVERY DAY 48 g 2     irbesartan (AVAPRO) 150 MG tablet TAKE 1 TABLET (150 MG TOTAL) BY MOUTH DAILY. 90 tablet 3     omeprazole (PRILOSEC) 40 MG capsule Take 1 capsule (40 mg total) by mouth daily. 90 capsule 1     SYMBICORT 80-4.5 mcg/actuation inhaler TAKE 2 PUFFS BY MOUTH EVERY 12 HOURS 30.6 Inhaler 1     therapeutic multivitamin (THERAGRAN) tablet Take 1 tablet by mouth daily.       VOLTAREN 1 % Gel APPLY 1/2 GRAM TOPICALLY 3 TIMES A DAY  0     amoxicillin (AMOXIL) 500 MG tablet TAKE 4 TABLETS BY MOUTH 1 HOUR BEFORE DENTAL APPOINTMENTS  1     amoxicillin-clavulanate (AUGMENTIN) 875-125 mg per tablet Take 1 tablet by mouth 2 (two) times a day for 7 days. 14 tablet 0     oxybutynin (DITROPAN XL) 5 MG ER tablet Take 1 tablet (5 mg total) by mouth daily. 30 tablet 11     No current facility-administered medications for this visit.        Imaging & Labs reviewed this visit: CMP, cholesterol levels, fasting glucose all  normal 3/12/2019      Objective:      Vitals:    05/09/19 1015   BP: 126/68   Pulse: 76   Weight: 121 lb 6.4 oz (55.1 kg)       Physical Exam:     General: Alert, no acute distress.   HEENT: normocephalic conjunctivae are clear, Normal pearly TMs bilaterally without erythema, pus or fluid. Position and landmarks are normal.  Nose with mucopurulent discharge. + Right maxillary sinus tenderness. Oropharynx is moist and clear, without tonsillar hypertrophy, asymmetry, exudate or lesions.   Neck: supple without adenopathy or thyromegaly.  Lungs: Good aeration bilaterally. Clear to auscultation without wheezes, rales or rhonci.   Heart: regular rate and rhythm, normal S1 and S2, no murmurs  Abdomen: soft and nontender  Skin: clear without rash or lesions  Neuro: alert, interactive moving all extremities equally      Kayla Gomez MD

## 2021-05-29 NOTE — PROGRESS NOTES
Patient is moving to a Wellness Plan. Please review emergency plan and update if needed.  Patient has completed all her goals and would like to graduate from [facility]       Next outreach will to send the graduation plan

## 2021-05-29 NOTE — TELEPHONE ENCOUNTER
Refill Approved    Rx renewed per Medication Renewal Policy. Medication was last renewed on 12/10/18.    Ov: 5/9/19    Kayla Caro, [facility] Triage/Med Refill 6/7/2019     Requested Prescriptions   Pending Prescriptions Disp Refills     omeprazole (PRILOSEC) 40 MG capsule [Pharmacy Med Name: OMEPRAZOLE DR 40 MG CAPSULE] 90 capsule 1     Sig: TAKE 1 CAPSULE BY MOUTH EVERY DAY       GI Medications Refill Protocol Passed - 6/5/2019 10:34 AM        Passed - PCP or prescribing provider visit in last 12 or next 3 months.     Last office visit with prescriber/PCP: 3/12/2019 Jocelin Kerr MD OR same dept: 3/12/2019 Jocelin Kerr MD OR same specialty: 3/12/2019 Jocelin Kerr MD  Last physical: 2/7/2018 Last MTM visit: Visit date not found   Next visit within 3 mo: Visit date not found  Next physical within 3 mo: Visit date not found  Prescriber OR PCP: Jocelin Kerr MD  Last diagnosis associated with med order: 1. Gastroesophageal reflux disease without esophagitis  - omeprazole (PRILOSEC) 40 MG capsule [Pharmacy Med Name: OMEPRAZOLE DR 40 MG CAPSULE]; TAKE 1 CAPSULE BY MOUTH EVERY DAY  Dispense: 90 capsule; Refill: 1    If protocol passes may refill for 12 months if within 3 months of last provider visit (or a total of 15 months).

## 2021-05-29 NOTE — PROGRESS NOTES
My Clinic Care Coordination Wellness Plan  This Graduation Wellness Plan provides private information in regards to the work I have done with my Care Team from my Primary Care Clinic.  This document provides insight on the goals I have accomplished.  My Care Team congratulates me on my journey to maintain wellness.  This document will help guide me on my journey to maintain a healthy lifestyle.  I will use this to help me overcome any barriers I may encounter.  If I should have any questions or concerns, I will continue to contact the members of my Care Team or contact my Primary Care Clinic for assistance.      [facility]  [address], [place]  [id number] [phone]    My Preferred Method of Contact:  Phone: [phone]    My Primary/Preferred Language:  English    Preferred Learning Style:  Reading information, Face to face discussion, Pictures/Diagrams and Hands on teaching    Emergency Contact: Extended Emergency Contact Information  Primary Emergency Contact: Meño Walters  Address: [address] [place] [address] [place] [address] [address] [facility]  Home Phone: [phone]  Relation: Spouse  Secondary Emergency Contact: NONE,PER PT  Relation: Declined     PCP:  Kayla Gomez MD  Specialists:    Care Team            Kayla Gomez MD   [phone] PCP - General, Family Medicine    Samantha Weiner [facility] Care Coordination Care Guide, Primary Care - [facility], [phone] FX: [phone]          Accomplishments:  Goals        Patient Stated      COMPLETED: CCC-Patient Stated I have the names of the Female Providers so I can Get a new PCP  (pt-stated)      Action steps to achieve this goal  1.  I will continue to make an appointment to establish care with a different female Provider       Completed 3/7/19  Date goal set:  1/22/19        COMPLETED: I have all the resources to help me care for my  better (pt-stated)       Personal Plan    1.  I will continue to work with my CG to find local resources that can help me take care of my .   2.  I will continue to call the Senior Linkage line to ask them about dental insurance options for my . 11/8/17  3.  I will continue to investigate some of the private-pay home care (for ) options given to me at my RN Assessment visit.     Completed 3/7/19  Date goal set:  6/12/17        COMPLETED: I want to get help for my 's depression (pt-stated)      Personal Plan     1.  I have discussed my concerns with his PCP, Dr. Salazar, at this time and I have enrolled my usamaabnd in [facility] to get him more support with his depression.       Date goal set:  6/12/17            Advanced Directive/Living Will: Not on File    Clinical Emergency Plan  Emergency Plan Recommendation:     When to Use the Emergency Department (ED)  An emergency means you could die if you don t get care quickly. Or you could be hurt permanently (disabled). Read below to know when to use -- and when not to use -- an emergency department (also called ED).     Dangers to your life  Here are examples of emergencies. These need immediate care:  A hard time breathing  Severe chest pain  Choking  Severe bleeding  Suddenly not able to move or speak  Blacking out (fainting)`  Poisoning     Dangers of permanent injuries  Here are other emergencies. These also need immediate care:  Deep cuts or severe burns  Broken bones, or sudden severe pain and swelling in a joint     When it s an emergency  If you have an emergency, follow these steps:     1. Go to the nearest emergency department  If you can, go to the hospital ED closest to you right away.  If you cannot get there right away, or if it is not safe to take yourself, call 911 or your police emergency number.  2. Call your primary care doctor  Tell your doctor about the emergency. Call within 24 hours of going to the ED.  If you cannot call, have someone call for  you.  Go to your doctor (not the ED) for any follow-up care.     When it s not an emergency  If a problem is not an emergency, follow these steps:     1. Call your primary care doctor  If you don t know the name of your doctor, call your health plan.  If you cannot call, have someone call for you.  2. Follow instructions  Your doctor will tell you what you should do.  You may be told to see your doctor right away. You may be told to go to the ED. Or you may be told to go to an urgent care center.  Follow your doctor s advice.  Handwashing: Tips for Patients, Family, and Friends  Germs are everywhere around us. Normally, we live with germs without getting sick. In certain cases, harmful germs cause us to get sick with an infection. Or we can spread harmful germs to others and cause them to get sick. Keeping your hands clean is the best way to prevent getting or spreading germs that cause infection. Wash your hands with soap and water or use an alcohol-based hand .  When to clean your hands: For patients  In the hospital or in your home, you can come in contact with many harmful germs. To help prevent infection, wash your hands often, especially:    After using the bathroom    Before and after eating    After coughing or sneezing    After using a tissue    After touching or changing a dressing or bandage    After touching any object or surface that may be contaminated    After touching an animal during a pet therapy session (hospital)    After touching an animal, cleaning up after a pet, or preparing food for pets (home)  If you don t have access to soap and water, use an alcohol-based hand gel containing at least 60% alcohol. These products kill most germs and are easy to use. But use soap and water (not alcohol-based hand gel) if your hands are visibly dirty.  When to clean your hands: For family and friends  When visiting or caring for a loved one, washing your hands or using an alcohol-based hand   can help stop germs from spreading. Wash your hands:    Before entering and after leaving the patient s room    As soon as you remove gloves or other protective clothing    After changing a dressing or bandage    After any contact with blood or other body fluids    After touching or changing the patient s bed linen or towels    After touching an animal during a pet therapy session (hospital)    After touching an animal, cleaning up after a pet, or preparing food for pets (home)  Many hospitals have sinks or gel dispensers right outside patient rooms. If not, carry a bottle of alcohol-based hand gel with you, and use it every time you visit. Use soap and water (not alcohol-based hand gel) if your hands are visibly dirty.  Tips for good handwashing  Here are some suggestions to follow:    Use warm water and plenty of soap. Work up a good lather.    Clean the whole hand, including under your nails, between your fingers, and up the wrists.    Wash for at least 15 to 20 seconds. Don t just wipe. Scrub well.    Rinse, letting the water run down your fingers, not up your wrists.    Dry your hands well. Use a paper towel to turn off the faucet and open the door.  Time matters  The longer you wash your hands, the more germs you ll remove. Most people wash their hands for 6 to 7 seconds. But at least 15 seconds are needed to remove germs. Singing Happy Birthday or the Alphabet Song are examples of how long 15 seconds would be. To protect yourself and others from infection, washing for 30 seconds is best.  How to use an alcohol-based hand   Alcohol-based hand  may kill more germs than soap and water. Use them when your hands aren t visibly dirty. For best results, follow these steps:    Choose a gel or spray that contains at least 60 percent alcohol. Products with less alcohol may not kill germs.    Spread about a tablespoon of  in the palm of one hand.    Rub your hands together briskly, cleaning the  backs of your hands, the palms, between your fingers, and up the wrists.    Rub until the  is gone, and your hands are completely dry.        All [facility] clinic patients have access to a Nurse 24 hours a day, 7 days a week.  If you have questions or want advice from a Nurse, please know [facility] is here for you.  You can call your clinic and they will connect you or you can call [facility] at [phone].  [facility] also has Walk In Care clinics in multiple locations.  Call the number listed above for more information about our Walk In Care clinics or visit the [facility] website at [email or web address].

## 2021-05-30 NOTE — PROGRESS NOTES
Assessment/Plan:        1. Acute recurrent maxillary sinusitis  azithromycin (ZITHROMAX) 500 MG tablet     Azithromycin 500 mg daily Rx is sent to her pharmacy. Sinus wash with nasal saline is recommended.     This note has been dictated using voice recognition software. Any grammatical or context distortions are unintentional and inherent to the software.      Return in about 4 weeks (around 8/7/2019) for Recheck.    There are no Patient Instructions on file for this visit.        Subjective:    Natalie Walters is a 74 y.o. female  here for    Chief Complaint   Patient presents with     Sinusitis     Sinus ifection x 5 days,  Green heavy discharge from R nostral-sme pain not to much-some ear discomfort     73 yo female with h/o recurrent sinus infections and sinus surgeries, presenting today with one week of right sided facial pain, pain in the upper teeth and jaw and green drainage from the right nostril. Also has right ear pain. No fever or chills.  She was treated with Augmentin in 3/2019.    Social History     Socioeconomic History     Marital status:      Spouse name: Not on file     Number of children: Not on file     Years of education: Not on file     Highest education level: Not on file   Occupational History     Not on file   Social Needs     Financial resource strain: Not on file     Food insecurity:     Worry: Not on file     Inability: Not on file     Transportation needs:     Medical: Not on file     Non-medical: Not on file   Tobacco Use     Smoking status: Never Smoker     Smokeless tobacco: Never Used   Substance and Sexual Activity     Alcohol use: No     Drug use: No     Sexual activity: Not on file   Lifestyle     Physical activity:     Days per week: Not on file     Minutes per session: Not on file     Stress: Not on file   Relationships     Social connections:     Talks on phone: Not on file     Gets together: Not on file     Attends Scientologist service: Not on file     Active member of  club or organization: Not on file     Attends meetings of clubs or organizations: Not on file     Relationship status: Not on file     Intimate partner violence:     Fear of current or ex partner: Not on file     Emotionally abused: Not on file     Physically abused: Not on file     Forced sexual activity: Not on file   Other Topics Concern     Not on file   Social History Narrative    ; her  has end stage CHF and she is primary caregiver.     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    Has two children; has 6 grandchildren.     Nonsmoker.     Kayla Gomez MD       Family History   Problem Relation Age of Onset     Pancreatic cancer Mother      Arthritis Brother      Breast cancer Maternal Grandmother      Heart attack Paternal Grandfather      Colon cancer Neg Hx      Review of Systems:     A 12 point comprehensive review of systems was negative except as noted in HPI.            Objective:    Physical Exam   /81   Pulse 67   Wt 120 lb 8 oz (54.7 kg)   SpO2 97%   BMI 22.04 kg/m      Constitutional: oriented to person, place, and time, appears well-nourished. No distress.   HENT:   Head: Normocephalic.   Mouth/Throat: Oropharynx is clear and moist.   Ears - normal TMs bilat.  Eyes: Conjunctivae are normal.  Sinus exam - tenderness on percussion in right maxillary sinus area.  Neck: Normal range of motion. Neck supple.   Cardiovascular: Normal rate, regular rhythm and normal heart sounds.    Pulmonary/Chest: Effort normal and breath sounds normal.  Neurological: alert and oriented to person, place, and time.  Psychiatric:  normal mood and affect.    Patient Active Problem List   Diagnosis     Dysthymic disorder     GERD (gastroesophageal reflux disease)     Hypertension     Moderate persistent asthma     Osteopenia with high risk of fracture     S/P total knee arthroplasty     Bronchiectasis (H)     Diverticulosis of large intestine without perforation or abscess without bleeding      Urge incontinence     Benign essential tremor- mild       Current Outpatient Medications on File Prior to Visit   Medication Sig Dispense Refill     albuterol (PROAIR HFA) 90 mcg/actuation inhaler Inhale 1-2 puffs.       amoxicillin (AMOXIL) 500 MG tablet TAKE 4 TABLETS BY MOUTH 1 HOUR BEFORE DENTAL APPOINTMENTS  1     atorvastatin (LIPITOR) 40 MG tablet Take 1 tablet (40 mg total) by mouth daily. 90 tablet 3     cholecalciferol, vitamin D3, (CHOLECALCIFEROL) 1,000 unit tablet Take 1,000 Units by mouth daily.       escitalopram oxalate (LEXAPRO) 20 MG tablet TAKE 1 TABLET (20 MG TOTAL) BY MOUTH DAILY. 90 tablet 3     ferrous sulfate 65 mg elemental iron Take 1 tablet by mouth 2 (two) times a week. Tuesdays and Fridays       fluticasone (FLONASE) 50 mcg/actuation nasal spray TAKE 2 PUFFS EACH NOSTRIL EVERY DAY 48 g 2     irbesartan (AVAPRO) 150 MG tablet TAKE 1 TABLET (150 MG TOTAL) BY MOUTH DAILY. 90 tablet 3     omeprazole (PRILOSEC) 40 MG capsule TAKE 1 CAPSULE BY MOUTH EVERY DAY 90 capsule 3     oxybutynin (DITROPAN XL) 5 MG ER tablet Take 1 tablet (5 mg total) by mouth daily. 30 tablet 11     SYMBICORT 80-4.5 mcg/actuation inhaler TAKE 2 PUFFS BY MOUTH EVERY 12 HOURS 30.6 Inhaler 1     therapeutic multivitamin (THERAGRAN) tablet Take 1 tablet by mouth daily.       VOLTAREN 1 % Gel APPLY 1/2 GRAM TOPICALLY 3 TIMES A DAY  0     No current facility-administered medications on file prior to visit.                Kenia Pereyra  7/10/2019

## 2021-05-31 VITALS — WEIGHT: 138 LBS | BODY MASS INDEX: 25.86 KG/M2

## 2021-05-31 VITALS — BODY MASS INDEX: 25.62 KG/M2 | HEIGHT: 62 IN | WEIGHT: 139.19 LBS

## 2021-05-31 VITALS — BODY MASS INDEX: 26.18 KG/M2 | WEIGHT: 139.7 LBS

## 2021-05-31 VITALS — HEIGHT: 61 IN | BODY MASS INDEX: 26.13 KG/M2 | WEIGHT: 138.4 LBS

## 2021-05-31 VITALS — BODY MASS INDEX: 26.27 KG/M2 | WEIGHT: 140.2 LBS

## 2021-05-31 VITALS — WEIGHT: 138 LBS | BODY MASS INDEX: 26.06 KG/M2 | HEIGHT: 61 IN

## 2021-05-31 VITALS — BODY MASS INDEX: 25.71 KG/M2 | WEIGHT: 137.19 LBS

## 2021-05-31 NOTE — PROGRESS NOTES
Assessment/Plan:        1. Bronchiectasis without complication (H)     2. Sinusitis, unspecified chronicity, unspecified location     3. Pulmonary nodules       Sinusitis, resolved.  Bronchiectasis, stable. Chronic cough possibly related to this.  Lung nodules - she would like to go to [place] for CT follow up.    This note has been dictated using voice recognition software. Any grammatical or context distortions are unintentional and inherent to the software.      Return in about 6 months (around 2/8/2020) for Annual physical.    There are no Patient Instructions on file for this visit.        Subjective:    Natalie Walters is a 74 y.o. female  here for    Chief Complaint   Patient presents with     Follow-up     F/U Recheck     Follow up sinusitis, treated with azithromycin 4 weeks ago. All symptoms resolved.  She reports chronic cough, stable.  She said she is seeing [facility] for bronchiectasis. I reviewed Care Everywhere and last CT scan was done in 2/2017. It did show 2 lung nodules.    Social History     Socioeconomic History     Marital status:      Spouse name: Not on file     Number of children: Not on file     Years of education: Not on file     Highest education level: Not on file   Occupational History     Not on file   Social Needs     Financial resource strain: Not on file     Food insecurity:     Worry: Not on file     Inability: Not on file     Transportation needs:     Medical: Not on file     Non-medical: Not on file   Tobacco Use     Smoking status: Never Smoker     Smokeless tobacco: Never Used   Substance and Sexual Activity     Alcohol use: No     Drug use: No     Sexual activity: Not on file   Lifestyle     Physical activity:     Days per week: Not on file     Minutes per session: Not on file     Stress: Not on file   Relationships     Social connections:     Talks on phone: Not on file     Gets together: Not on file     Attends Anglican service: Not on file     Active member of club or  organization: Not on file     Attends meetings of clubs or organizations: Not on file     Relationship status: Not on file     Intimate partner violence:     Fear of current or ex partner: Not on file     Emotionally abused: Not on file     Physically abused: Not on file     Forced sexual activity: Not on file   Other Topics Concern     Not on file   Social History Narrative    ; her  has end stage CHF and she is primary caregiver.     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    Has two children; has 6 grandchildren.     Nonsmoker.     Kayla Gomez MD       Family History   Problem Relation Age of Onset     Pancreatic cancer Mother      Arthritis Brother      Breast cancer Maternal Grandmother      Heart attack Paternal Grandfather      Colon cancer Neg Hx      Review of Systems:     A 12 point comprehensive review of systems was negative except as noted in HPI.            Objective:    Physical Exam   /79   Pulse 75   Wt 119 lb 9.6 oz (54.3 kg)   SpO2 97%   BMI 21.88 kg/m      Constitutional: oriented to person, place, and time, appears well-nourished. No distress.   HENT:   Head: Normocephalic.   Mouth/Throat: Oropharynx is clear and moist.   Eyes: Conjunctivae are normal. Pupils are equal, round, and reactive to light.   Neck: Normal range of motion. Neck supple.   Cardiovascular: Normal rate, regular rhythm and normal heart sounds.    Pulmonary/Chest: Effort normal and breath sounds normal.  Abdominal: Soft. Bowel sounds are normal.   Musculoskeletal: Normal range of motion.   Neurological: alert and oriented to person, place, and time.  Psychiatric:  normal mood and affect.    Patient Active Problem List   Diagnosis     Dysthymic disorder     GERD (gastroesophageal reflux disease)     Hypertension     Moderate persistent asthma     Osteopenia with high risk of fracture     S/P total knee arthroplasty     Bronchiectasis (H)     Diverticulosis of large intestine without  perforation or abscess without bleeding     Urge incontinence     Benign essential tremor- mild     Pulmonary nodules       Current Outpatient Medications on File Prior to Visit   Medication Sig Dispense Refill     albuterol (PROAIR HFA) 90 mcg/actuation inhaler Inhale 1-2 puffs.       amoxicillin (AMOXIL) 500 MG tablet TAKE 4 TABLETS BY MOUTH 1 HOUR BEFORE DENTAL APPOINTMENTS  1     atorvastatin (LIPITOR) 40 MG tablet Take 1 tablet (40 mg total) by mouth daily. 90 tablet 3     cholecalciferol, vitamin D3, (CHOLECALCIFEROL) 1,000 unit tablet Take 1,000 Units by mouth daily.       escitalopram oxalate (LEXAPRO) 20 MG tablet TAKE 1 TABLET (20 MG TOTAL) BY MOUTH DAILY. 90 tablet 3     ferrous sulfate 65 mg elemental iron Take 1 tablet by mouth 2 (two) times a week. Tuesdays and Fridays       fluticasone (FLONASE) 50 mcg/actuation nasal spray TAKE 2 PUFFS EACH NOSTRIL EVERY DAY 48 g 2     irbesartan (AVAPRO) 150 MG tablet TAKE 1 TABLET (150 MG TOTAL) BY MOUTH DAILY. 90 tablet 3     omeprazole (PRILOSEC) 40 MG capsule TAKE 1 CAPSULE BY MOUTH EVERY DAY 90 capsule 3     oxybutynin (DITROPAN XL) 5 MG ER tablet Take 1 tablet (5 mg total) by mouth daily. 30 tablet 11     SYMBICORT 80-4.5 mcg/actuation inhaler TAKE 2 PUFFS BY MOUTH EVERY 12 HOURS 30.6 Inhaler 1     therapeutic multivitamin (THERAGRAN) tablet Take 1 tablet by mouth daily.       VOLTAREN 1 % Gel APPLY 1/2 GRAM TOPICALLY 3 TIMES A DAY  0     No current facility-administered medications on file prior to visit.                Kenia Pereyra  8/8/2019

## 2021-06-01 VITALS — BODY MASS INDEX: 26.77 KG/M2 | WEIGHT: 144 LBS

## 2021-06-01 VITALS — BODY MASS INDEX: 25.97 KG/M2 | WEIGHT: 142 LBS

## 2021-06-01 VITALS — BODY MASS INDEX: 25.03 KG/M2 | WEIGHT: 136 LBS | HEIGHT: 62 IN

## 2021-06-01 VITALS — BODY MASS INDEX: 25.78 KG/M2 | WEIGHT: 138.7 LBS

## 2021-06-01 VITALS — WEIGHT: 128.06 LBS | BODY MASS INDEX: 23.42 KG/M2

## 2021-06-01 VITALS — WEIGHT: 131.56 LBS | BODY MASS INDEX: 24.06 KG/M2

## 2021-06-01 VITALS — WEIGHT: 138 LBS | BODY MASS INDEX: 25.24 KG/M2

## 2021-06-01 VITALS — BODY MASS INDEX: 25.24 KG/M2 | WEIGHT: 138 LBS

## 2021-06-01 NOTE — TELEPHONE ENCOUNTER
Refill Approved    Rx renewed per Medication Renewal Policy. Medication was last renewed on 8/26/2018 for 90/3.  Last OV 8/8/2019  Clarisse Barry, [facility] Triage/Med Refill 9/6/2019     Requested Prescriptions   Pending Prescriptions Disp Refills     irbesartan (AVAPRO) 150 MG tablet [Pharmacy Med Name: IRBESARTAN 150 MG TABLET] 90 tablet 3     Sig: TAKE 1 TABLET (150 MG TOTAL) BY MOUTH DAILY.       Angiotensin Receptor Blocker Protocol Passed - 9/6/2019  1:31 AM        Passed - PCP or prescribing provider visit in past 12 months       Last office visit with prescriber/PCP: 3/12/2019 Jocelin Kerr MD OR same dept: 8/8/2019 Kenia Pereyra MD OR same specialty: 8/8/2019 Kenia Pereyra MD  Last physical: 2/7/2018 Last MTM visit: Visit date not found   Next visit within 3 mo: Visit date not found  Next physical within 3 mo: Visit date not found  Prescriber OR PCP: Jocelin Kerr MD  Last diagnosis associated with med order: 1. HTN (hypertension)  - irbesartan (AVAPRO) 150 MG tablet [Pharmacy Med Name: IRBESARTAN 150 MG TABLET]; TAKE 1 TABLET (150 MG TOTAL) BY MOUTH DAILY.  Dispense: 90 tablet; Refill: 3    If protocol passes may refill for 12 months if within 3 months of last provider visit (or a total of 15 months).             Passed - Serum potassium within the past 12 months     Lab Results   Component Value Date    Potassium 4.1 03/12/2019             Passed - Blood pressure filed in past 12 months     BP Readings from Last 1 Encounters:   08/08/19 131/79             Passed - Serum creatinine within the past 12 months     Creatinine   Date Value Ref Range Status   03/12/2019 0.77 0.60 - 1.10 mg/dL Final

## 2021-06-02 VITALS — BODY MASS INDEX: 22.35 KG/M2 | WEIGHT: 122.2 LBS

## 2021-06-02 VITALS — HEIGHT: 62 IN | BODY MASS INDEX: 22.38 KG/M2 | WEIGHT: 121.6 LBS

## 2021-06-02 NOTE — TELEPHONE ENCOUNTER
RN cannot approve Refill Request    RN can NOT refill this medication med is not covered by policy/route to provider. Last office visit: 5/9/2019 Kayla Gomez MD Last Physical: Visit date not found Last MTM visit: Visit date not found Last visit same specialty: 5/9/2019 Kayla Gomez MD.  Next visit within 3 mo: Visit date not found  Next physical within 3 mo: Visit date not found      Nadira Dean, [facility] Triage/Med Refill 11/1/2019    Requested Prescriptions   Pending Prescriptions Disp Refills     budesonide-formoterol (SYMBICORT) 80-4.5 mcg/actuation inhaler 30.6 Inhaler 1     Sig: TAKE 2 PUFFS BY MOUTH EVERY 12 HOURS       There is no refill protocol information for this order

## 2021-06-03 VITALS — BODY MASS INDEX: 21.88 KG/M2 | WEIGHT: 119.6 LBS

## 2021-06-03 VITALS — BODY MASS INDEX: 22.2 KG/M2 | WEIGHT: 121.4 LBS

## 2021-06-03 VITALS — WEIGHT: 120.5 LBS | BODY MASS INDEX: 22.04 KG/M2

## 2021-06-03 NOTE — TELEPHONE ENCOUNTER
Refill Approved    Rx renewed per Medication Renewal Policy. Medication was last renewed on 10/25/18.    Lanette KATIE Austin, [facility] Connection Triage/Med Refill 11/28/2019     Requested Prescriptions   Pending Prescriptions Disp Refills     atorvastatin (LIPITOR) 40 MG tablet [Pharmacy Med Name: ATORVASTATIN 40 MG TABLET] 90 tablet 3     Sig: TAKE 1 TABLET (40 MG TOTAL) BY MOUTH DAILY.       Statins Refill Protocol (Hmg CoA Reductase Inhibitors) Passed - 11/26/2019  4:47 AM        Passed - PCP or prescribing provider visit in past 12 months      Last office visit with prescriber/PCP: 3/12/2019 Jocelin Kerr MD OR same dept: 8/8/2019 Kenia Pereyra MD OR same specialty: 8/8/2019 Kenia Pereyra MD  Last physical: 2/7/2018 Last MTM visit: Visit date not found   Next visit within 3 mo: Visit date not found  Next physical within 3 mo: Visit date not found  Prescriber OR PCP: Jocelin Kerr MD  Last diagnosis associated with med order: 1. Hypercholesterolemia  - atorvastatin (LIPITOR) 40 MG tablet [Pharmacy Med Name: ATORVASTATIN 40 MG TABLET]; Take 1 tablet (40 mg total) by mouth daily.  Dispense: 90 tablet; Refill: 3    If protocol passes may refill for 12 months if within 3 months of last provider visit (or a total of 15 months).

## 2021-06-04 VITALS
DIASTOLIC BLOOD PRESSURE: 71 MMHG | SYSTOLIC BLOOD PRESSURE: 125 MMHG | WEIGHT: 119 LBS | BODY MASS INDEX: 21.77 KG/M2 | HEART RATE: 76 BPM

## 2021-06-04 VITALS
SYSTOLIC BLOOD PRESSURE: 100 MMHG | RESPIRATION RATE: 20 BRPM | BODY MASS INDEX: 21.47 KG/M2 | DIASTOLIC BLOOD PRESSURE: 56 MMHG | WEIGHT: 117.38 LBS | HEART RATE: 76 BPM | OXYGEN SATURATION: 96 % | TEMPERATURE: 98 F

## 2021-06-04 VITALS
WEIGHT: 118 LBS | TEMPERATURE: 99.2 F | HEART RATE: 84 BPM | HEIGHT: 62 IN | OXYGEN SATURATION: 99 % | BODY MASS INDEX: 21.71 KG/M2 | SYSTOLIC BLOOD PRESSURE: 138 MMHG | DIASTOLIC BLOOD PRESSURE: 78 MMHG

## 2021-06-05 NOTE — TELEPHONE ENCOUNTER
"RN Triage:    Was bitten by her own large dog this morning on right middle finger.  \"It was my fault\".  Cut is about 1/4 inch long.  Cut was cleansed well immediately after incident.  Dog is up to date on shots.  Her tetanus is 5 1/2 years old.  Home care discussed.  Triage nurse advised evaluation this evening per guideline.  She is concerned about ice on the roads so she is not able to come in this evening.  Appointment made in clinic for tomorrow.    Linda Kumar RN   [facility]            Reason for Disposition    [1] Puncture wound or small cut AND [2] on hands or genitals    Protocols used: ANIMAL BITE-A-AH      "

## 2021-06-05 NOTE — PROGRESS NOTES
ASSESSMENT:  1. Dog bite, initial encounter  Patient with a superficial dog bite to the right third finger.  I do not think this is at all infected at this time and I think the likelihood of infection is low.  The dog is her own and his up-to-date on immunizations.        PLAN:  1.  Recommend the patient simply soak the finger for the next 5 to 7 days and use topical antibiotics a couple of times per day.  2.  Would not start the patient on any antibiotic until or unless there are any signs of infection.      No orders of the defined types were placed in this encounter.    There are no discontinued medications.    Return in about 1 week (around 1/29/2020) for recheck w/ PCP if symptoms persist or worsen.    CHIEF COMPLAINT:  Chief Complaint   Patient presents with     Animal Bite     was bitten by her own dog yesterday (see triage notes)        SUBJECTIVE:  Natalie is a 74 y.o. female presents to the clinic because yesterday she was bitten by her dog on her finger. Her dog is up to date on shots, but her tetanus shot is 5 1/2 years old. The wound was cleansed right after, and she managed to stop the bleeding. Patient states that there is some slight pain when moving her finger. She wonders if it may become infected.     Her  recently passed away. She states he was unhappy and at times wished he was dead. Natalie mentions a bad fall her  had that had sent him to the hospital. She had occasionally at home nursing help. She is confused because on the death certificate, cause of death was pulmonary embolism but she had believed it as due to the injury he had from hitting his head on furniture.     REVIEW OF SYSTEMS:   Positive for slight pain in finger when moved.   All other systems are negative.    PFSH:  Immunization History   Administered Date(s) Administered     Influenza high dose,seasonal,PF, 65+ yrs 10/02/2015, 11/16/2017, 10/18/2018     Influenza, Seasonal, Inj PF IIV3 10/25/2011     Influenza, inj,  historic,unspecified 12/04/2006, 11/10/2008, 10/05/2009, 11/01/2010, 10/25/2011, 10/01/2012, 11/07/2012, 10/16/2019     Influenza, seasonal,quad inj 6-35 mos 11/04/2014     Influenza,seasonal quad, PF 10/30/2013     Influenza,seasonal quad, PF, =/> 6months 10/30/2013     Influenza,seasonal, Inj IIV3 12/04/2003, 10/18/2004, 12/04/2006, 11/10/2008, 10/05/2009, 11/01/2010, 10/25/2011, 11/07/2012, 11/04/2014     Pneumo Conj 13-V (2010&after) 07/21/2016     Pneumo Polysac 23-V 01/01/2009, 09/09/2009     Td,adult,historic,unspecified 05/24/2006     Tdap 05/24/2006, 05/20/2014     ZOSTER, LIVE 09/09/2009     Social History     Socioeconomic History     Marital status:      Spouse name: Not on file     Number of children: Not on file     Years of education: Not on file     Highest education level: Not on file   Occupational History     Not on file   Social Needs     Financial resource strain: Not on file     Food insecurity:     Worry: Not on file     Inability: Not on file     Transportation needs:     Medical: Not on file     Non-medical: Not on file   Tobacco Use     Smoking status: Never Smoker     Smokeless tobacco: Never Used   Substance and Sexual Activity     Alcohol use: No     Drug use: No     Sexual activity: Not on file   Lifestyle     Physical activity:     Days per week: Not on file     Minutes per session: Not on file     Stress: Not on file   Relationships     Social connections:     Talks on phone: Not on file     Gets together: Not on file     Attends Zoroastrian service: Not on file     Active member of club or organization: Not on file     Attends meetings of clubs or organizations: Not on file     Relationship status: Not on file     Intimate partner violence:     Fear of current or ex partner: Not on file     Emotionally abused: Not on file     Physically abused: Not on file     Forced sexual activity: Not on file   Other Topics Concern     Not on file   Social History Narrative    ; her   has end stage CHF and she is primary caregiver.     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    Has two children; has 6 grandchildren.     Nonsmoker.     Kayla Gomez MD     Past Medical History:   Diagnosis Date     Asthma      Chronic cough      Essential tremor      Hypertension      Pneumonia 08/10/2014    pt states this is 5th occurrence     Recurrent pneumonia 6/18/2012     Family History   Problem Relation Age of Onset     Pancreatic cancer Mother      Arthritis Brother      Breast cancer Maternal Grandmother      Heart attack Paternal Grandfather      Colon cancer Neg Hx        MEDICATIONS:  Current Outpatient Medications   Medication Sig Dispense Refill     albuterol (PROAIR HFA) 90 mcg/actuation inhaler Inhale 1-2 puffs.       atorvastatin (LIPITOR) 40 MG tablet TAKE 1 TABLET (40 MG TOTAL) BY MOUTH DAILY. 90 tablet 2     budesonide-formoterol (SYMBICORT) 80-4.5 mcg/actuation inhaler TAKE 2 PUFFS BY MOUTH EVERY 12 HOURS 30.6 Inhaler 1     cholecalciferol, vitamin D3, (CHOLECALCIFEROL) 1,000 unit tablet Take 1,000 Units by mouth daily.       escitalopram oxalate (LEXAPRO) 20 MG tablet TAKE 1 TABLET (20 MG TOTAL) BY MOUTH DAILY. 90 tablet 3     ferrous sulfate 65 mg elemental iron Take 1 tablet by mouth 2 (two) times a week. Tuesdays and Fridays       fluticasone (FLONASE) 50 mcg/actuation nasal spray TAKE 2 PUFFS EACH NOSTRIL EVERY DAY 48 g 2     irbesartan (AVAPRO) 150 MG tablet Take 1 tablet (150 mg total) by mouth daily. 90 tablet 3     omeprazole (PRILOSEC) 40 MG capsule TAKE 1 CAPSULE BY MOUTH EVERY DAY 90 capsule 3     oxybutynin (DITROPAN XL) 5 MG ER tablet Take 1 tablet (5 mg total) by mouth daily. 30 tablet 11     therapeutic multivitamin (THERAGRAN) tablet Take 1 tablet by mouth daily.       VOLTAREN 1 % Gel APPLY 1/2 GRAM TOPICALLY 3 TIMES A DAY  0     amoxicillin (AMOXIL) 500 MG tablet TAKE 4 TABLETS BY MOUTH 1 HOUR BEFORE DENTAL APPOINTMENTS  1     No current  facility-administered medications for this visit.        TOBACCO USE:  Social History     Tobacco Use   Smoking Status Never Smoker   Smokeless Tobacco Never Used       VITALS:  Vitals:    01/22/20 0853   BP: 125/71   Pulse: 76   Weight: 119 lb (54 kg)     Wt Readings from Last 3 Encounters:   01/22/20 119 lb (54 kg)   08/08/19 119 lb 9.6 oz (54.3 kg)   07/10/19 120 lb 8 oz (54.7 kg)       PHYSICAL EXAM:  Constitutional:   Reveals a healthy appearing woman.  Vitals: per nursing notes.  HEENT:  Ears:  External canals, TMs clear.    Eyes:  EOMs full, PERRL.  Psychiatric:  Memory intact, mood appropriate.  Extremities: Right 3rd finger, 2 cm scar, no bleeding or drainage, no surrounding erythema, not warm to the touch.    DATA REVIEWED:  Additional History from Old Records Summarized (2): Triage note from 01/21/2019, Patient's dog bit her finger. Wound was cleanse immediately after incident.   Decision to Obtain Records (1): None  Radiology Tests Summarized or Ordered (1): None  Labs Reviewed or Ordered (1): None  Medicine Test Summarized or Ordered (1): None  Independent Review of EKG, X-RAY, or RAPID STREP (2 each): None    The visit lasted a total of 13 minutes face to face with the patient. Over 50% of the time was spent counseling and educating the patient about dog bite on her finger.    Kayla ALMANZAR, am scribing for and in the presence of, Dr. Martin Fuentes, personally performed the services described in this documentation, as scribed by Kayla Godinez in my presence, and it is both accurate and complete.    Total data points: 1

## 2021-06-06 NOTE — PROGRESS NOTES
ASSESSMENT/PLAN:  Cough  This is a 74-year-old female with chronic productive cough presented today for follow-up for cough.  She completed 5 days of prednisone 20 mg.  She is currently still taking doxycycline.  She has had mild improvement but experienced 1 episode of shortness of breath last night.  Vital signs were stable.  Patient's examination was unremarkable, including lung exam.  I reviewed her recent chest x-ray with her.  She was advised to finish the course of doxycycline.  She was advised to continue with Symbicort 2 puffs twice a day.  I will refill albuterol and ask her to take albuterol every 6 hours for the next 48 hours.  I also asked her to follow-up with her primary care provider in 2 weeks to ensure continued improvement.  The patient verbalized understanding and agreed with the plan  -     albuterol (PROAIR HFA) 90 mcg/actuation inhaler; Inhale 1-2 puffs every 6 (six) hours as needed for wheezing.      CHIEF COMPLAINT:  Chief Complaint   Patient presents with     Breathing Problem     Sinus Problem     right side worse      Headache     HISTORY OF PRESENT ILLNESS:  Natalie is a 74 y.o. female presenting to the clinic today for a cough follow up. She has a history of bronchiectasis for many years. She was seen in clinic on 03/04 by Dr. Chappell for a cough, burning sensation and rhinorrhea that had been present for 2 days. She had a clear chest x-ray in clinic. She was diagnosed with bronchiectasis with acute exacerbation. She was prescribed a 5-day course of prednisone and a 10-day course of doxycycline. She has finished the prednisone and she is almost done with the doxycycline. She has been using Symbicort two puffs twice a day. She started to feel an improvement in her symptoms until yesterday. Last night, she was having difficulty catching her breath. This made it hard to get any sleep as well. She wore her smart vest last night to help her cough up phlegm. The patient reports that the attack  last night made her very scared and nervous. Today, she feels fatigued and lightheaded. However, she is able to catch her breath. She has been wheezing as well. She does not smoke tobacco.     REVIEW OF SYSTEMS:   Constitutional: Negative.   HENT: Negative.   Eyes: Negative.   Respiratory: Negative.   Cardiovascular: Negative.   Gastrointestinal: Negative.   Endocrine: Negative.   Genitourinary: Negative.   Musculoskeletal: Negative.   Skin: Negative.   Allergic/Immunologic: Negative.   Neurological: Negative.   Hematological: Negative.   Psychiatric/Behavioral: Negative.   All other systems are negative.    PFSH:  Her father and her  both  from lung cancer as a result of smoking tobacco.     TOBACCO USE:  Social History     Tobacco Use   Smoking Status Never Smoker   Smokeless Tobacco Never Used     VITALS:  Vitals:    20 1353   BP: 100/56   Pulse: 76   Resp: 20   Temp: 98  F (36.7  C)   TempSrc: Oral   SpO2: 96%   Weight: 117 lb 6 oz (53.2 kg)     Wt Readings from Last 3 Encounters:   20 117 lb 6 oz (53.2 kg)   20 118 lb (53.5 kg)   20 119 lb (54 kg)       PHYSICAL EXAM:  Constitutional: Patient is oriented to person, place, and time. Patient appears well-developed and well-nourished. No distress.   Head: Normocephalic and atraumatic.   Right Ear: External ear normal.   Left Ear: External ear normal.   Nose: Nose normal.   Cardiovascular: Normal rate, regular rhythm, normal heart sounds and intact distal pulses. No murmur heard.   Pulmonary/Chest: Effort normal and breath sounds normal. No stridor. No respiratory distress. Patient has no wheezes, no rales, exhibits no tenderness.   Neurological: Patient is alert and oriented to person, place, and time. Patient has normal reflexes. No cranial nerve deficit. Coordination normal.   Skin: Skin is warm and dry. No rash noted. Patient is not diaphoretic. No erythema. No pallor.    ADDITIONAL HISTORY SUMMARIZED (2): Reviewed RN triage  note from today about cough. Reviewed note from 03/04/2020 about a cough.   DECISION TO OBTAIN EXTRA INFORMATION (1): None.   RADIOLOGY TESTS (1): Reviewed XR Chest from 03/04/2020 which was clear.   LABS (1): None.  MEDICINE TESTS (1): None.  INDEPENDENT REVIEW (2 each): None.     ILanette, am scribing for and in the presence of, Dr. Boucher.    I, Dr. Boucher, personally performed the services described in this documentation, as scribed by Lanette Sapp in my presence, and it is both accurate and complete.    MEDICATIONS:  Current Outpatient Medications   Medication Sig Dispense Refill     atorvastatin (LIPITOR) 40 MG tablet TAKE 1 TABLET (40 MG TOTAL) BY MOUTH DAILY. 90 tablet 2     budesonide-formoterol (SYMBICORT) 80-4.5 mcg/actuation inhaler TAKE 2 PUFFS BY MOUTH EVERY 12 HOURS 30.6 Inhaler 1     cholecalciferol, vitamin D3, (CHOLECALCIFEROL) 1,000 unit tablet Take 1,000 Units by mouth daily.       doxycycline (MONODOX) 100 MG capsule Take 1 capsule (100 mg total) by mouth 2 (two) times a day for 10 days. 20 capsule 0     escitalopram oxalate (LEXAPRO) 20 MG tablet TAKE 1 TABLET (20 MG TOTAL) BY MOUTH DAILY. 90 tablet 3     ferrous sulfate 65 mg elemental iron Take 1 tablet by mouth 2 (two) times a week. Tuesdays and Fridays       fluticasone (FLONASE) 50 mcg/actuation nasal spray TAKE 2 PUFFS EACH NOSTRIL EVERY DAY 48 g 2     irbesartan (AVAPRO) 150 MG tablet Take 1 tablet (150 mg total) by mouth daily. 90 tablet 3     omeprazole (PRILOSEC) 40 MG capsule TAKE 1 CAPSULE BY MOUTH EVERY DAY 90 capsule 3     oxybutynin (DITROPAN XL) 5 MG ER tablet Take 1 tablet (5 mg total) by mouth daily. 30 tablet 11     therapeutic multivitamin (THERAGRAN) tablet Take 1 tablet by mouth daily.       VOLTAREN 1 % Gel APPLY 1/2 GRAM TOPICALLY 3 TIMES A DAY  0     albuterol (PROAIR HFA) 90 mcg/actuation inhaler Inhale 1-2 puffs every 6 (six) hours as needed for wheezing. 1 Inhaler 1     No current facility-administered  medications for this visit.        Total data points:3

## 2021-06-06 NOTE — TELEPHONE ENCOUNTER
Reached out to patient and relayed the below message. Patient states once she completes the antibiotics she will see hoe she is feeling, and schedule an office visit if she continues to experience symptoms. Angie Martinez

## 2021-06-06 NOTE — TELEPHONE ENCOUNTER
Patient was given doxycycline and prednisone on 3/4 visit   She should finish the coarse first if haven't started then pick the prescription for both    Marilou Chappell MD 3/10/2020 1:18 PM

## 2021-06-06 NOTE — PROGRESS NOTES
Assessment/plan   Natalie Walters is a 74 y.o. female who is New patient to my practice here with   Chief Complaint   Patient presents with     Cough     x2 days - see triage         Natalie was seen today for cough.    Diagnoses and all orders for this visit:    Bronchiectasis with acute exacerbation (H)  On x-ray, the patient's lungs are clear. The patient should start the steroid today to help decrease inflammation and cough. If she develops new fever or worsening of symptoms, in the next 24-48 hours, she should begin the antibiotic and take for 10 days. She was also encouraged to use her Symbicort inhaler 2 times a day during the acute exacerbation. She can also continue with the chest physiotherapy vest daily.     -     XR Chest 2 Views  -     predniSONE (DELTASONE) 20 MG tablet; Take 20 mg by mouth 2 (two) times a day for 5 days.  -     doxycycline (MONODOX) 100 MG capsule; Take 1 capsule (100 mg total) by mouth 2 (two) times a day for 10 days.    Moderate persistent asthma without complication  ACT score 25. No changes to asthma treatment at this time.           There are no Patient Instructions on file for this visit.  Subjective:      HPI: Natalie Walters is a 74 y.o. female is here for cough x 2 days more worried before traveling next wk     Cough: Patient complains of left ear pain, mild facial pressure over right maxillary sinus, productive cough with sputum described as clear to  yellow, and mild sore throat. Symptoms began 2 days ago. The cough is productive of small amount of clear to yellow sputum without wheezing, dyspnea or hemoptysis and is aggravated by nothing. Associated symptoms include: burning pain in the middle of the chest during coughing episodes. Patient denies fever, chills, sweats, dyspnea, wheezing, sinus congestion, nasal drainage, nausea, vomiting, and diarrhea. No change in eating or sleeping patterns due to cough. Patient does not have new pets. Patient does have a history of asthma  and bronchiectasis. No recent asthma or bronchiectasis flares. Patient states her last CT chest done by pulmonologist at [facility] did not show any mucous plugs. Patient does have a history of environmental allergens. Patient denies recent travel. Patient does not have a history of smoking. Patient has not had a PPD done. She is currently using her Symbicort inhaler 1 puff daily due to cost and fluticasone nasal spray daily. She has not needed her albuterol inhaler and states her asthma is well controlled. The patient also has a chest physiotherapy vest, which she is using 2-3 times per week.        I have personally reviewed the patient's allergies, medications, past medical history, family history, social history, rooming notes and problem list in detail and updated the patient record as necessary.      Past Medical History:   Diagnosis Date     Asthma      Chronic cough      Essential tremor      Hypertension      Pneumonia 08/10/2014    pt states this is 5th occurrence     Recurrent pneumonia 6/18/2012     Past Surgical History:   Procedure Laterality Date     PARATHYROID GLAND SURGERY      gland removed with benign tumor     IN NASAL/SINUS ENDOSCOPY,OPEN MAXILL SINUS      Description: Nasal Endoscopy With Maxillary Antrostomy;  Recorded: 08/20/2013;     IN REMOVE TONSILS/ADENOIDS,<13 Y/O      Description: Tonsillectomy With Adenoidectomy;  Recorded: 08/20/2013;     IN REVISION OF CERVIX,NON OBSTETRICAL      Description: Cervical Cerclage (Non-OB);  Recorded: 08/20/2013;     REPLACEMENT TOTAL KNEE Right 3/1/2018    Procedure: RIGHT TOTAL KNEE ARTHROPLASTY COMPUTER ASSIST;  Surgeon: Sorin Lovell MD;  Location: [facility];  Service:      SINUS SURGERY  05/2015    [facility]; 3 hr sugery, bones removed     Cat dander; Iodine; and Other drug allergy (see comments)  Current Outpatient Medications   Medication Sig Dispense Refill     albuterol (PROAIR HFA) 90 mcg/actuation inhaler Inhale 1-2 puffs.        atorvastatin (LIPITOR) 40 MG tablet TAKE 1 TABLET (40 MG TOTAL) BY MOUTH DAILY. 90 tablet 2     budesonide-formoterol (SYMBICORT) 80-4.5 mcg/actuation inhaler TAKE 2 PUFFS BY MOUTH EVERY 12 HOURS 30.6 Inhaler 1     cholecalciferol, vitamin D3, (CHOLECALCIFEROL) 1,000 unit tablet Take 1,000 Units by mouth daily.       escitalopram oxalate (LEXAPRO) 20 MG tablet TAKE 1 TABLET (20 MG TOTAL) BY MOUTH DAILY. 90 tablet 3     ferrous sulfate 65 mg elemental iron Take 1 tablet by mouth 2 (two) times a week. Tuesdays and Fridays       fluticasone (FLONASE) 50 mcg/actuation nasal spray TAKE 2 PUFFS EACH NOSTRIL EVERY DAY 48 g 2     irbesartan (AVAPRO) 150 MG tablet Take 1 tablet (150 mg total) by mouth daily. 90 tablet 3     omeprazole (PRILOSEC) 40 MG capsule TAKE 1 CAPSULE BY MOUTH EVERY DAY 90 capsule 3     oxybutynin (DITROPAN XL) 5 MG ER tablet Take 1 tablet (5 mg total) by mouth daily. 30 tablet 11     therapeutic multivitamin (THERAGRAN) tablet Take 1 tablet by mouth daily.       VOLTAREN 1 % Gel APPLY 1/2 GRAM TOPICALLY 3 TIMES A DAY  0     doxycycline (MONODOX) 100 MG capsule Take 1 capsule (100 mg total) by mouth 2 (two) times a day for 10 days. 20 capsule 0     predniSONE (DELTASONE) 20 MG tablet Take 20 mg by mouth 2 (two) times a day for 5 days. 10 tablet 0     No current facility-administered medications for this visit.      Family History   Problem Relation Age of Onset     Pancreatic cancer Mother      Arthritis Brother      Breast cancer Maternal Grandmother      Heart attack Paternal Grandfather      Colon cancer Neg Hx        Patient Active Problem List   Diagnosis     Dysthymic disorder     GERD (gastroesophageal reflux disease)     Hypertension     Moderate persistent asthma     Osteopenia with high risk of fracture     S/P total knee arthroplasty     Bronchiectasis (H)     Diverticulosis of large intestine without perforation or abscess without bleeding     Urge incontinence     Benign essential tremor-  "mild     Pulmonary nodules       Review of Systems   12 point comprehensive review of systems was negative except as noted and HPI     Social History     Social History Narrative    ; her  has end stage CHF and she is primary caregiver.     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    Has two children; has 6 grandchildren.     Nonsmoker.     Kayla Gomez MD       Objective:     Vitals:    03/04/20 1312 03/04/20 1426 03/04/20 1427   BP: 140/82 140/82 138/78   Pulse: 84     Temp: 99.2  F (37.3  C)     TempSrc: Oral     SpO2: 99%     Weight: 118 lb (53.5 kg)     Height: 5' 2\" (1.575 m)         Physical Exam:   Physical Exam:  General Appearance:  Appears comfortable, Alert, cooperative, no distress,   Head: Normocephalic, without obvious abnormality, atraumatic  Eyes: PERRL, conjunctiva/corneas clear, EOM's intact, both eyes             Nose: Nares normal, no drainage   Throat: Lips, mucosa, and tongue normal; teeth and gums normal  Neck: Supple, symmetrical, trachea midline, no adenopathy;                      Lungs: Good aeration, rhonchi and wheezing heard on auscultation bilaterally, respirations unlabored  Heart: Regular rate and rhythm, S1 and S2 normal, no murmur, rubs or gallop  Extremities: Extremities normal, atraumatic, no cyanosis or edema  Skin: no rashes or lesions  Neurologic: normal and equal strength bilat in upper and lower extremities        This note has been dictated using voice recognition software. Any grammatical or context distortions are unintentional and inherent to the software    Lula Vazquez  Faculty Supervision of  Student     I have examined this patient and the medical care has been evaluated and discussed with the  Student  The documentation has been reviewed.  I agree with the medical care provided and confirm the findings.         Marilou Chappell MD 3/4/2020 5:33 PM        "

## 2021-06-06 NOTE — TELEPHONE ENCOUNTER
I haven't seen pt in nearly a year but would be happy to see her if she completes the triage screening for cough/respiratory symptoms which is our recommended process at this time.   Please have her triaged/oncare per protocol. Additionally, with her hx I imagine we may need to refer to pulmonology given the treatments that have already been recommended and will place a referral for this in case she can be seen with them for this in the near future. Otherwise I can see her once she has completed travel/screening/triage.    Thanks,  Kayla Gomez MD

## 2021-06-06 NOTE — TELEPHONE ENCOUNTER
Medication Request  Medication name: something for sinus infection  Requested Pharmacy: [facility]  Reason for request: patient states she has an sinus infection . And has history of infections and would like something prescribed for her. Please advise  When did you use medication last?:  n/a  Patient offered appointment:  patient declined  Okay to leave a detailed message: yes

## 2021-06-06 NOTE — TELEPHONE ENCOUNTER
Reached out to patient and left a message to return phone call. Please relay the below message when patient calls back.   Thank you, Angie Martinez

## 2021-06-06 NOTE — TELEPHONE ENCOUNTER
"New Appointment Needed  What is the reason for the visit:    Follow up from vist on 3/11/20 with Dr Alvarez  Provider Preference: Primary  How soon do you need to be seen?: The patient states she is unsure. The patient states she was told she would receive a call from Dr Alvarez's office for a follow up visit and has not received a call. The patient states because of her \" Bronchiectasis she needs something.\"   Waitlist offered?: No  Okay to leave a detailed message:  Yes  During the phone discussion patient changed the reason for the call to requesting an antibiotic.   The patient states she is having mild symptoms of shortness of breath, cough, denies fever. The patient states she would like another prescription for Doxycycline or another type of antibiotic.     This writer informed the patient of the following information:  On 03/16/2020, for the safety of all public health, we were notified to postpone all non essential visits until July 6, 2020 or later. If you do have a concern that can not wait, we do have the option of telephone or e visits with your provide.     The patient would like a call from Kayla Gomez MD's team for further recommendations.        "

## 2021-06-06 NOTE — TELEPHONE ENCOUNTER
If she develops a fever (defined as a temperature of 100.4 or higher), she should be seen again, or if she has any worsening breathing symptoms. Low grade temperature elevations of 99s are still not within range of fever and may fluctuate over the course of a respiratory illness. She is on appropriate antibiotics for sinus/lung infections so may take some time but please be seen for any concerns.

## 2021-06-06 NOTE — TELEPHONE ENCOUNTER
Patient Returning Call  Reason for call:  Patient returning call to the clinic stating I am requesting another round of antibiotics as this illness had gone into my lungs.   Information relayed to patient:  Yes as instructed below and patient agrees with plan to contact the [facility] for screening and to schedule with Kayla Gomez MD after this.   Patient has additional questions:  No  If YES, what are your questions/concerns:  none  Okay to leave a detailed message?: Yes

## 2021-06-06 NOTE — TELEPHONE ENCOUNTER
New Appointment Needed  What is the reason for the visit:    Cough - Per Triage RN     Provider Preference: PCP only     How soon do you need to be seen?: This week - Per Triage RN to be seen this week    Waitlist offered?: No    Okay to leave a detailed message:  Yes

## 2021-06-06 NOTE — TELEPHONE ENCOUNTER
Who is calling:  Ptatient  Reason for Call:  Pt calling back stating she has no more prednisone, and has 9 pills of Doxycycline left.  Writer asked Pt if she wanted a different antibiotic because of the Sinus infection, Pt states she is not sure if the Doxy is something that they would prescribe for Sinus infections.  Please advise.  Date of last appointment with primary care: N/A  Okay to leave a detailed message: No

## 2021-06-06 NOTE — PATIENT INSTRUCTIONS - HE
Starting taking prednisone (steroid) today for next 5 days to help with inflammation and cough.  If new fever or worsening of symptoms in next 24-48 hours, start taking doxycycline (antibiotic) for 10 days. If symptoms improve with steroid, do not need to take antibiotic.   Symbicort inhaler 2 times a day during flare.  Increase use of vest for bronchiectasis during flare.

## 2021-06-06 NOTE — TELEPHONE ENCOUNTER
Reached out to patient and left a message to return phone call. Please relay the below message and assist with scheduling. Thank you, Angie Martinez

## 2021-06-06 NOTE — TELEPHONE ENCOUNTER
These symptoms need more clarification in one of the following ways: Please facilitate scheduling OV, phone visit or evisit. Thanks, Kayla Gomez MD

## 2021-06-06 NOTE — TELEPHONE ENCOUNTER
Respiratory system is compromised     Started with a bad cough.2 weeks ago,    Saw Dr. Chappell on 4th March. Put on prednisone, and doxycycline.  Finished antibiotic on March 4th.  Cough is about the same, and does not burn anymore.    Suppose to go back to be rechecked .by Dr. Brito  In 2 weeks.    No other new problems mentioned by patient.    Thu Pappas RN  [facility] Triage/refill nurse    Reason for Disposition    Patient wants to be seen    Protocols used: COUGH-A-OH

## 2021-06-06 NOTE — TELEPHONE ENCOUNTER
RN triage   Call from pt   Pt states she has been sick for 1 + weeks -- cough   Seen 3/4 -- -- has completed prednisone -- has 3 days left of doxycyline   Last night - did not sleep -- cough worse - thick phlegm -   ' ollie' labored breathing overnight --- breathing OK now   Also having sinus symptoms now -- sinus pain/pressure -- R maxillary and R teeth and R ear pain  -- has hx of sinus infection/ surgery   Has hx of bronchectasis   Using symbicort two times a day   The burning she had when seen, w/ coughing is now gone -- breathing OK today -- no wheeze -- not labored now --   Today T = 99.9 --- new fever -- did not have fever before   Reviewed home care for fever / cough/ sinus   Per protocol = should be seen   Please advise = do you want pt seen again?    Danna Sheppard RN [facility] RN triage    Reason for Disposition    Known COPD or other severe lung disease (i.e., bronchiectasis, cystic fibrosis, lung surgery) and worsening symptoms (i.e., increased sputum purulence or amount, increased breathing difficulty)    Protocols used: COUGH-A-OH

## 2021-06-06 NOTE — TELEPHONE ENCOUNTER
Who is calling:  Patient   Reason for Call:  Caller did call back again requesting for medication for sinus, Kayla Gomez MD message was relay to patient and patient decided she rather speak to Nurse triage on regards to symptoms. (transferred to triage).   Date of last appointment with primary care:   Okay to leave a detailed message: Yes

## 2021-06-06 NOTE — TELEPHONE ENCOUNTER
Doxycycline is also a good antibiotic for sinus symptoms and one we commonly use for that purpose too. If she is feeling worse please encourage office visit.

## 2021-06-06 NOTE — TELEPHONE ENCOUNTER
[facility] triage call :   Presenting problem : Hx of lung problems but not Asthma per say  , uses smart-  vest vibrating and MDI .    Pt called. Cough , intermittent  mild shortness breath  &  sinus congestion / pain, and  no fever started 3/4/20  .  Seen 3/4/20 at [facility]  and improved chest  burning  after Prednisone finished  . Currently : no  Fever or Chest pain , but bright green mucousy cough and nasal discharge , occasional new  wheezing at night /  1&0 , eat and activity are all ok.     Guideline used : Breathing difficulty A OH.   Disposition and recommendations :  Getting into office now and sent to  .   Caller verbalizes understanding and denies further questions and will call back if further symptoms to triage or questions  . Bouchra Herrera RN  - [place] Nurse Advisor     Reason for Disposition    MILD difficulty breathing (e.g., minimal/no SOB at rest, SOB with walking, pulse < 100) of new onset or worse than normal    Protocols used: BREATHING DIFFICULTY-A-OH

## 2021-06-06 NOTE — TELEPHONE ENCOUNTER
Cough started yesterday.  Burning cough down chest. No congestion, body aches or fever.    Traveling next week and wants to be seen.    She has a lung condition from past that makes her more susceptible to illness.    She wants to be seen.    Transferred to scheduling for an appointment.    Umu Lee RN [facility] Triage Nurse Advisor    Reason for Disposition    Patient wants to be seen    Protocols used: COUGH-A-OH

## 2021-06-06 NOTE — TELEPHONE ENCOUNTER
Who is calling:  Patient - Natalie  Reason for Call:  Was triaged to be seen in office for recurring symptoms - cough, shortness of breath, but when sent to scheduling she is requesting a prescription for a sinus infection from Dr. Chappell.  Date of last appointment with primary care: 3/4  Okay to leave a detailed message: Yes

## 2021-06-07 NOTE — TELEPHONE ENCOUNTER
Reached out to patient and relayed the below message. No additional questions at this time. Angie Martinez

## 2021-06-07 NOTE — PROGRESS NOTES
PHONE VISIT  Due to current COVID19 pandemic, pt was offered virtual visit in lieu of in office evaluation to limit exposures.      Assessment/plan  Natalie Walters is a 74 y.o. female who is established to my practice.    Natalie was seen today for sinus problem and cough.    Diagnoses and all orders for this visit:    Acute sinusitis with symptoms > 10 days  Her main concern is related to sinus sx, right maxillary facial pain, pressure, purulent mucus. Reviewed she has already had a good medication for sinus sx (doxycycline) but she has found augmentin to work better for her in the past. Will send Rx for this and if no improvement she will reach out to her pulmonologist at [place] given her hx of bronchiectasis. Additionally recommended sinus rinses.   -     amoxicillin-clavulanate (AUGMENTIN XR) 1,000-62.5 mg per tablet; Take 2 tablets by mouth 2 (two) times a day for 7 days.    Bronchiectasis with acute lower respiratory infection (H)  S/p course of doxycycline and prednisone on 3/4/2020. She has had normal CXR without acute worsening in her symptoms aside from prolonged cough and sinus sx. No fevers. At this time I feel she can continue cares at home. Unable to assess oxygenation, reports she has a normal respiratory rate, no distress with breathing.   - Increase vest therapy to twice daily  - Continue symbicort twice daily  - Continue albuterol prn  - Defer further steroids at this time  - Reviewed her cough and sx could be COVID19 and she continues to isolate herself.   - She has a pulmonologist at [place]- we have encouraged her to reach out if sx continue or can be referred to our local pulmonologists (currently referral in place)    Telephone visit start time: 11:48am  Telephone visit end time: 12:05pm  Total time: 17 min    Follow up: for annual exam pending COVID19    Kayla Gomez MD    Subjective:      HPI: Natalie Walters is a 74 y.o. female who is being evaluated via a billable telephone visit due to  COVID19 pandemic precautions.    Chief Complaint   Patient presents with     Sinus Problem     blows nose quite a bit and is green in color     Cough     productive cough       Sinus and cough concerns: She tried to do [email or web address] or triage and couldn't get it to work.     On 3/3/2020 she had a horrible cough and terrible shortness of breath. Hx of bronchiectasis for years.  Saw Dr. Chappell on 3/4/2020 and given 10d of doxycycline and prednisone x 5 days. The burning sensation in her throat improved with these meds. The cough still persisted.    Since that time, has had more sinus sx and mucus and nasal discharge. Right maxillary area is bothering her the most and has a hx of this. Head pain when she bends over.   Using a smart vest once daily and albuterol 1-2 puffs q 6 hours (currently a couple times per day) and symbicort 2 puffs twice daily.     States the sinus sx are most bothersome. The cough isn't much improved.    Hx of sinus surgery in 2015.     She hasn't seen her [facility] pulmonologist for a long time. They were happy about the lack of mucus on her last assessments and vest therapy had been controlling her symptoms.     Review of Systems:  Had a temperature of 99.9 twice during all of this but went away, last was several weeks ago.  Occasional shortness of breath but overall much better.   No recent travel.  NO nausea, vomiting, diarrhea. Normal appetitie.     12 point comprehensive review of systems was negative except as noted and HPI     Social History:  Social History     Social History Narrative    ; her  has end stage CHF and she is primary caregiver.     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    Has two children; has 6 grandchildren.     Nonsmoker.     Kayla Gomez MD       Medical History:   I have reviewed and updated the patient's Past Medical History, Social History, Family History and Medication List.    Medications:  Current Outpatient Medications   Medication  Sig Dispense Refill     albuterol (PROAIR HFA) 90 mcg/actuation inhaler Inhale 1-2 puffs every 6 (six) hours as needed for wheezing. 1 Inhaler 1     atorvastatin (LIPITOR) 40 MG tablet TAKE 1 TABLET (40 MG TOTAL) BY MOUTH DAILY. 90 tablet 2     budesonide-formoterol (SYMBICORT) 80-4.5 mcg/actuation inhaler TAKE 2 PUFFS BY MOUTH EVERY 12 HOURS 30.6 Inhaler 1     cholecalciferol, vitamin D3, (CHOLECALCIFEROL) 1,000 unit tablet Take 1,000 Units by mouth daily.       escitalopram oxalate (LEXAPRO) 20 MG tablet TAKE 1 TABLET (20 MG TOTAL) BY MOUTH DAILY. 90 tablet 3     fluticasone (FLONASE) 50 mcg/actuation nasal spray TAKE 2 PUFFS EACH NOSTRIL EVERY DAY 48 g 2     irbesartan (AVAPRO) 150 MG tablet Take 1 tablet (150 mg total) by mouth daily. 90 tablet 3     omeprazole (PRILOSEC) 40 MG capsule TAKE 1 CAPSULE BY MOUTH EVERY DAY 90 capsule 3     oxybutynin (DITROPAN XL) 5 MG ER tablet Take 1 tablet (5 mg total) by mouth daily. 30 tablet 11     therapeutic multivitamin (THERAGRAN) tablet Take 1 tablet by mouth daily.       amoxicillin-clavulanate (AUGMENTIN XR) 1,000-62.5 mg per tablet Take 2 tablets by mouth 2 (two) times a day for 7 days. 28 tablet 0     ferrous sulfate 65 mg elemental iron Take 1 tablet by mouth 2 (two) times a week. Tuesdays and Fridays       VOLTAREN 1 % Gel APPLY 1/2 GRAM TOPICALLY 3 TIMES A DAY  0     No current facility-administered medications for this visit.        Imaging & Labs reviewed this visit: none      Objective:      There were no vitals filed for this visit.    Physical Exam: Not performed in context of phone visit    Kayla Gomez MD

## 2021-06-07 NOTE — TELEPHONE ENCOUNTER
Patient Returning Call    Reason for call:  lmtcb    Information relayed to patient:  Patient informed of med change per PCP and verbalize an understanding.    Patient has additional questions:  No  If YES, what are your questions/concerns:  Na    Okay to leave a detailed message?: No call back needed

## 2021-06-07 NOTE — TELEPHONE ENCOUNTER
Refill Approved    Rx renewed per Medication Renewal Policy. Medication was last renewed on 3/25/19.    Rebecca Christopher, [facility] Connection Triage/Med Refill 3/19/2020     Requested Prescriptions   Pending Prescriptions Disp Refills     escitalopram oxalate (LEXAPRO) 20 MG tablet 90 tablet 3     Sig: Take 1 tablet (20 mg total) by mouth daily.       SSRI Refill Protocol  Passed - 3/18/2020  3:30 PM        Passed - PCP or prescribing provider visit in last year     Last office visit with prescriber/PCP: 5/9/2019 Kayla Gomez MD OR same dept: 3/11/2020 Misbah Aj MD OR same specialty: 3/11/2020 Misbah Aj MD  Last physical: Visit date not found Last MTM visit: Visit date not found   Next visit within 3 mo: Visit date not found  Next physical within 3 mo: Visit date not found  Prescriber OR PCP: Kayla Gomez MD  Last diagnosis associated with med order: 1. Dysthymic disorder  - escitalopram oxalate (LEXAPRO) 20 MG tablet; Take 1 tablet (20 mg total) by mouth daily.  Dispense: 90 tablet; Refill: 3    If protocol passes may refill for 12 months if within 3 months of last provider visit (or a total of 15 months).

## 2021-06-07 NOTE — TELEPHONE ENCOUNTER
Please relay the followin. If the exposure to the tick bite was within the past 72 hours, it may be reasonable to give a single dose of doxycycline to prevent Lyme disease. Typically this is only needed if tick was attached for >36 hours. I have sent the antibiotic if she desires based on this information.    2. I do think it would be reasonable for antibody testing based on the history. I have placed the lab blood test order and a  will call to have this done. If she would like more info, please review the following or schedule a phone visit with me. Thanks. Kayla Gomez MD        Q: What is antibody testing?   A: Antibody testing, also known as serologic testing, is used to detect antibodies in a patient s blood specimen (serum).   Antibodies are produced by the immune system in response to an infection. The presence of antibodies indicates that an individual has been exposed to (infected with) that particular infectious agent. These antibodies can be detected in some cases for years after the individual has recovered from the infection and will also be present if the patient was never even symptomatic. Serologic tests detect antibodies against infectious agents in serum and are a marker of an immune response to infection.     Q: Will this test detect antibodies in everyone who has had COVID-19?   A: Antibodies to SARS-CoV-2 are consistently detected after 10 days from symptom onset (seroconversion) for most patients. Some individuals may develop antibodies after this time frame, while others, particularly those who are immunosuppressed, may never develop a detectable immune response.     Q: What does detection of antibodies against SARS-CoV-2 mean for a person?   A: Detection suggests:     Prior exposure (infection) to the virus.     The presence of an immune response. Scientists are still trying to learn about the immune response in COVID-19 to determine if someone with an immune response  to SARS-CoV-2 is less likely to develop COVID-19 compared to seronegative individuals.

## 2021-06-07 NOTE — TELEPHONE ENCOUNTER
Who is calling:  Patient  Reason for Call:  Pt calling regarding 2 issues. First Pt was bitten by tick that got inbedded slept with it close to 24 hours does not want lymes disease the tick was  bigger than a deer tick did not see it initially. Is there anything she should do regarding it? Second, daughter in law is convinced Pt had corona virus 03/4 and 03/11 was seen in clinic, she said she had the worse night of her life.  Pt says she was so short of breath she thought she was not going to live through night, burning in chest as well.  Provider prescribed prednisone and antibiotic.  Pt is wondering if she can be tested for antibodies? Pharmacy on file. Please advise.   Date of last appointment with primary care: N/A  Okay to leave a detailed message: Yes

## 2021-06-07 NOTE — TELEPHONE ENCOUNTER
If she is feeling well she can cut down on the dosing to 1 puff twice a day (instead of 2 puffs twice daily).   She can ask call her insurance to check if alternative options are less expensive or she can have her pulmonologist at [place] help to change this medication.

## 2021-06-07 NOTE — TELEPHONE ENCOUNTER
Refill Approved    Rx renewed per Medication Renewal Policy. Medication was last renewed on 1/5/19.    Rebecca Christopher, [facility] Connection Triage/Med Refill 4/17/2020     Requested Prescriptions   Pending Prescriptions Disp Refills     fluticasone propionate (FLONASE) 50 mcg/actuation nasal spray [Pharmacy Med Name: FLUTICASONE PROP 50 MCG SPRAY] 16 g 2     Sig: TAKE 2 PUFFS EACH NOSTRIL EVERY DAY       Nasal Steroid Refill Protocol Passed - 4/16/2020  1:19 PM        Passed - Patient has had office visit/physical in last 2 years     Last office visit with prescriber/PCP: 5/9/2019 OR same dept: 8/8/2019 Kenia Pereyra MD OR same specialty: 8/8/2019 Kenia Pereyra MD Last physical: Visit date not found Last MTM visit: Visit date not found    Next appt within 3 mo: Visit date not found  Next physical within 3 mo: Visit date not found  Prescriber OR PCP: Kayla Gomez MD  Last diagnosis associated with med order: There are no diagnoses linked to this encounter.   If protocol passes may refill for 12 months if within 3 months of last provider visit (or a total of 15 months).

## 2021-06-07 NOTE — TELEPHONE ENCOUNTER
Pt was asked on 3/16/2020 to complete [email or web address] screening first, see triage note form 3/16 which documents this conversation. I checked care-everywhere where we are told we can find these [email or web address] virutal visits and it does not appear she has a virtual visit completed for that yet.     Also, please schedule for phone visit so we can review things as I have not seen her in a year. I did place a referral to Pulmonology on 3/16 as well. She has completed several rounds of antibiotics and prednisone.

## 2021-06-07 NOTE — TELEPHONE ENCOUNTER
Left a detailed message for pt with message from Dr. Gomez and left the clinic number for call back and reminded of the option to send message via [employer] as well.

## 2021-06-07 NOTE — TELEPHONE ENCOUNTER
Patient Returning Call  Reason for call:  Symbicort medication question.  Information relayed to patient:   See Kayla Gomez MD;'s message  Patient has additional questions:  No  If YES, what are your questions/concerns:  NA   Okay to leave a detailed message?: No call back needed

## 2021-06-07 NOTE — TELEPHONE ENCOUNTER
Hi Dr. Gomez,    [facility] sent a fax stating the Amox-Clav-ER 1,000-62.5mg tablet that was sent on 03/19/2020 with cost the patient $173.00 and is requesting a lower cost alternative be sent.    Thank you,  Christina Jenkins LPN

## 2021-06-07 NOTE — TELEPHONE ENCOUNTER
Pt called in ask medication question.  The question is answered, no other concern at this time.      Jalil Lazo RN, [facility] Triage/Med Refill 5/4/2020 5:17 PM

## 2021-06-07 NOTE — TELEPHONE ENCOUNTER
"Pt calling about med question - budesonide and formoterol. Pt reports that cost is around $200 per inhalation & side effects are \"not good for people my age with osteoporosis\".     Wants to know if she can cut down to lower doses throughout the day versus current schedule. Is not currently experiencing any side effects.     Page routed to patient's care team pool.   Advised to call back if condition worsens or side effects appear.    Reason for Disposition    Caller has NON-URGENT medication question about med that PCP prescribed and triager unable to answer question    Protocols used: MEDICATION QUESTION CALL-KATIE-SAM Hopkins RN    "

## 2021-06-07 NOTE — TELEPHONE ENCOUNTER
LMTCB: please encourage patient to call oncare [phone] or go to [email or web address] to evaluate symptoms further. & please help schedule telephone visit with Dr. Gomez

## 2021-06-07 NOTE — PROGRESS NOTES
"Natalie Walters is a 74 y.o. female who is being evaluated via a billable telephone visit.      The patient has been notified of following:     \"This telephone visit will be conducted via a call between you and your physician/provider. We have found that certain health care needs can be provided without the need for a physical exam.  This service lets us provide the care you need with a short phone conversation.  If a prescription is necessary we can send it directly to your pharmacy.  If lab work is needed we can place an order for that and you can then stop by our lab to have the test done at a later time.    If during the course of the call the physician/provider feels a telephone visit is not appropriate, you will not be charged for this service.\"         Natalie Walters complains of    Chief Complaint   Patient presents with     Sinus Problem     blows nose quite a bit and is green in color     Cough     productive cough       I have reviewed and updated the patient's Past Medical History, Social History, Family History and Medication List.    ALLERGIES  Cat dander; Iodine; and Other drug allergy (see comments)    Sigrid Mayorga LPN    "

## 2021-06-07 NOTE — TELEPHONE ENCOUNTER
Who is calling:  Patient  Reason for Call:  Patient called stating she tried to do an Oncare visit but was unable. [facility] is unable to schedule telephone visits. Please call patient to to schedule.  Date of last appointment with primary care: 3/11/2020  Okay to leave a detailed message: Yes

## 2021-06-07 NOTE — TELEPHONE ENCOUNTER
Lmtcb: please relay below message to patient.    Verito MINAYA CMA ([facility])

## 2021-06-07 NOTE — TELEPHONE ENCOUNTER
Reached out to patient and assisted with scheduling a telephone visit. Patient states she tried very hard to complete her portion on [email or web address], but was unsuccessful. Thank you, Angie Martinez

## 2021-06-08 NOTE — TELEPHONE ENCOUNTER
I generally don't prescribe Xanax but can send it's alternative Ativan (similar benzodiazepine medication/dose but less severe side effects than Xanax's fast on/off action).   Schedule VV with me if questions remain, as it's not a medication I've prescribed for her before but it does look like she has been on benzodiazepines in the past (both of these in 2018 for example)  Thanks,  Kayla Gomez MD

## 2021-06-08 NOTE — TELEPHONE ENCOUNTER
Medication Request  Medication name: Medication   ALPRAZolam tablet 0.5 mg (XANAX) [325]   ALPRAZolam tablet 0.5 mg (XANAX)   [[id number]]   Order Details   Ordered Dose: 0.5 mg  Route: Oral  Frequency: 3 times daily PRN for Flying only    Administration Dose: 0.5 mg           Requested Pharmacy: [facility]  Reason for request: Patient states she is going to be flying this Saturday and is requesting a quantity of 10 tablets be sent to her pharmacy.   Please call when medication is sent.    Okay to leave a detailed message: yes

## 2021-06-08 NOTE — TELEPHONE ENCOUNTER
Reached to patient an relayed the below message. No additional questions at this time. Angie Martinez

## 2021-06-08 NOTE — TELEPHONE ENCOUNTER
Reached out to patient and relayed the below message. Per the request of patient, a Video Visit was scheduled to discuss the need for Xanax.   Angie Martinez

## 2021-06-08 NOTE — TELEPHONE ENCOUNTER
Left message for patient to call back. Please clarify whether patient wants this refilled. She stated at her visit 6/16/20 that she wasn't taking this. Last filled 4/23/20.  Sigrid Mayorga LPN

## 2021-06-08 NOTE — TELEPHONE ENCOUNTER
Medication Question or Clarification  Who is calling: patient   What medication are you calling about (include dose and sig)?:    Disp  Refills  Start  End  BRANDYN    doxycycline (MONODOX) 100 MG capsule  2 capsule  0  5/4/2020 5/4/2020  --    Sig - Route: Take 2 capsules (200 mg total) by mouth once for 1 dose. - Oral    Sent to pharmacy as: doxycycline monohydrate 100 mg capsule (MONODOX)    E-Prescribing Status: Receipt confirmed by pharmacy (5/4/2020  5:18 PM CDT)        Who prescribed the medication?: Kayla Gomez MD   What is your question/concern?: caller is not understanding how to take this mediation. Caller is needing clarifications on the route.   Requested Pharmacy: [facility]  Okay to leave a detailed message?: Yes  [phone]

## 2021-06-08 NOTE — TELEPHONE ENCOUNTER
Question following Office Visit  When did you see your provider: 6/1/2020  What is your question: States the provider wanted a follow up on her lump under her arm. States it is still the same, has not gotten smaller or grown. Is leaving out of [place] for 12 days to see her grandchild coming this Saturday, so would like a call back from provider on what she thinks she should do next.  Okay to leave a detailed message: Yes      Please advise patient that she has refills on her omeprazole and lexapro that she has to call the pharmacy to fill.

## 2021-06-08 NOTE — PROGRESS NOTES
PHONE VISIT  Due to current COVID19 pandemic, pt was offered virtual visit in lieu of in office evaluation to limit exposures.      Assessment/plan  Natalie Walters is a 75 y.o. female who is established to my practice.    Axillary lump, left  Tiny pea sized lump in left axilla by pt report, present x 1 week. Suspected small inflamed hair follicle after shaving. Last mammogram was done 2/2019 and normal- she was hoping to schedule her routine mammogram now but due to this concern, she was advised to contact me first. For now, given low risk factors for breast cancer, she will hold off on scheduling her routine mammogram and wait to see if this tiny axillary lump resolves with warm compresses. If not, we will investigate it further. I also advised she can come into the clinic for better information gathering since a phone call is challenging to assess such a lump. She will call to let us know if the lump is still there or not. If present, we will order a limited left sided breast ultrasound and she can have this done with a diagnostic mammogram.      COVID19 precautions reviewed, questions answered. Referred to CDC for latest updates.     Telephone visit start time: 12:10pm  Telephone visit end time: 12:21pm  Total time: 11min    Follow up: 2 weeks- she will call to let us know if the lump is still there or not. If present, we will order a limited left sided breast ultrasound and she can have this done with a diagnostic mammogram.    Kayla Gomez MD    Subjective:      HPI: Natalie Walters is a 75 y.o. female who is being evaluated via a billable telephone visit due to COVID19 pandemic precautions.    Chief Complaint   Patient presents with     Concerns     called to schedule mammo- was asked if she had any concerns and she did say she had a nodule so they recommended that she talk to PCP first       Breast concern: she has discovered a tiny pea sized nodule under her LEFT armpit in axilla area about 1 week ago  (discovered this while shaving under her arm). It has not changed in size. It does not bother her. She is only calling today because she was trying to schedule her screening mammogram and they asked her to contact me due to a concern about a lump.     She feels like the tiny lump is in the armpit and she does not suspect it is breast tissue.   It seems to move around when she pushes on it. Nontender. No redness. No skin changes.       Grandmother  of breast cancer (post menopausal)        Review of Systems:  No fevers, night sweats or chills.   No nipple pain, retraction or asymmetry of the breast per pt  No nipple discharge    12 point comprehensive review of systems was negative except as noted and HPI     Social History:  Social History     Social History Narrative    ; her  has end stage CHF and she is primary caregiver.     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    Has two children; has 6 grandchildren.     Nonsmoker.     Kayla Gomez MD       Medical History:   I have reviewed and updated the patient's Past Medical History, Social History, Family History and Medication List.    Medications:  Current Outpatient Medications   Medication Sig Dispense Refill     albuterol (PROAIR HFA) 90 mcg/actuation inhaler Inhale 1-2 puffs every 6 (six) hours as needed for wheezing. 1 Inhaler 1     atorvastatin (LIPITOR) 40 MG tablet TAKE 1 TABLET (40 MG TOTAL) BY MOUTH DAILY. 90 tablet 2     budesonide-formoterol (SYMBICORT) 80-4.5 mcg/actuation inhaler TAKE 2 PUFFS BY MOUTH EVERY 12 HOURS 30.6 Inhaler 1     cholecalciferol, vitamin D3, (CHOLECALCIFEROL) 1,000 unit tablet Take 1,000 Units by mouth daily.       escitalopram oxalate (LEXAPRO) 20 MG tablet Take 1 tablet (20 mg total) by mouth daily. 90 tablet 3     ferrous sulfate 65 mg elemental iron Take 1 tablet by mouth 2 (two) times a week.  and        fluticasone propionate (FLONASE) 50 mcg/actuation nasal spray TAKE 2 PUFFS  EACH NOSTRIL EVERY DAY 48 g 3     irbesartan (AVAPRO) 150 MG tablet Take 1 tablet (150 mg total) by mouth daily. 90 tablet 3     omeprazole (PRILOSEC) 40 MG capsule TAKE 1 CAPSULE BY MOUTH EVERY DAY 90 capsule 1     oxybutynin (DITROPAN XL) 5 MG ER tablet Take 1 tablet (5 mg total) by mouth daily. 30 tablet 11     therapeutic multivitamin (THERAGRAN) tablet Take 1 tablet by mouth daily.       VOLTAREN 1 % Gel APPLY 1/2 GRAM TOPICALLY 3 TIMES A DAY  0     No current facility-administered medications for this visit.        Imaging & Labs reviewed this visit:  BILATERAL FULL FIELD DIGITAL SCREENING MAMMOGRAM     Performed on: 2/27/19.        No comparisons were made when reading this study.        Findings: The breasts have scattered areas of fibroglandular densities. There is no radiographic evidence of malignancy. This study was evaluated with the assistance of Computer-Aided Detection. Continue routine screening mammogram as recommended.     ACR BI-RADS Category 1: Negative      Objective:      There were no vitals filed for this visit.    Physical Exam: Not performed in context of phone visit    Kayla Gomez MD

## 2021-06-08 NOTE — PROGRESS NOTES
PHONE VISIT  Due to current COVID19 pandemic, pt was offered virtual visit in lieu of in office evaluation to limit exposures.      Assessment/plan  Natalie Walters is a 75 y.o. female who is established to my practice.    Fear of flying  Risk benefit conversation regarding her preference for Xanax over a re-trial of Ativan. Given her hx of successful limited use and no side effects with desired reduction in anxiety level with regard to flight anxiety, I agree she can have a short course of Xanax for her upcoming flights. She is a low risk candidate for addiction. Reviewed her age and side effects to watch for with this Rx. CSA not signed given rare PRN use anticipated- should last a year or so.   - ALPRAZolam (XANAX) 0.5 MG tablet; Take 1 tablet (0.5 mg total) by mouth every 8 (eight) hours as needed for anxiety (1hr before flights).      She is getting her mammogram/ultrasound done to follow up the axillary lump (see 6/1 VV notes)     COVID19 precautions reviewed, questions answered. Referred to [facility] for latest updates.     Follow up: annual exam    Kayla Gomez MD    Total phone visit time: 11 minutes    Subjective:      HPI: Natalei Walters is a 75 y.o. female who is being evaluated via a billable phone visit due to COVID19 pandemic precautions.    Chief Complaint   Patient presents with     Anxiety     would like medication for her upcoming flight, states that she would like xanax. She does not take it any other time besides flights. Has had ativan before for an MRI and stated that it did not work for her.        Flight anxiety: She had requested refill of Xanax earlier (rx from several years ago), and I had offered an alternative benzo or to schedule visit with me if wanting to discuss further; hence our conversation today.    She reports has been on Xanax 0.5mg in the past ([id number] at least by chart review) without side effects only using during flights due to anxiety related to this travel. She did travel  "a lot years ago. She used to carry it with her \"for comfort\". She is flying out on Saturday and gone for 2 weeks before her return flight. She is going to visit grandchildren. Her daughters-in-law are 1000 miles away.    She is not an addictive person- never smoked, never drinks.   She reports trying Ativan before an MRI in 2018 at [place] and this wasn't effective for her.       She sleeps with the dog. She sleeps from 10pm to 5:15am when her dogs wake her up. She has trouble going back to sleep at that time. Doesn't want to take sleeping meds but told me her daughter asked her to bring this up.     She has had more trouble sleeping since  when her  of 52 years.    Review of Systems:  No cough, fever, shortness of breath  The axillary lump we talked about a couple weeks ago is still persisting. She has plans for  imaging.   12 point comprehensive review of systems was negative except as noted and HPI     Social History:  Social History     Social History Narrative    , her late  ( 53yrs)  18;  had end stage CHF and she was primary caregiver.     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    Has two children who live in [place] and [place]; has 6 grandchildren.     Nonsmoker.     Kayla Gomez MD       Medical History:   I have reviewed and updated the patient's Past Medical History, Social History, Family History and Medication List.    Medications:  Current Outpatient Medications   Medication Sig Dispense Refill     atorvastatin (LIPITOR) 40 MG tablet TAKE 1 TABLET (40 MG TOTAL) BY MOUTH DAILY. 90 tablet 2     budesonide-formoterol (SYMBICORT) 80-4.5 mcg/actuation inhaler TAKE 2 PUFFS BY MOUTH EVERY 12 HOURS 30.6 Inhaler 1     cholecalciferol, vitamin D3, (CHOLECALCIFEROL) 1,000 unit tablet Take 1,000 Units by mouth daily.       escitalopram oxalate (LEXAPRO) 20 MG tablet Take 1 tablet (20 mg total) by mouth daily. 90 tablet 3     ferrous sulfate 65 mg elemental iron " Take 1 tablet by mouth 2 (two) times a week. Tuesdays and Fridays       fluticasone propionate (FLONASE) 50 mcg/actuation nasal spray TAKE 2 PUFFS EACH NOSTRIL EVERY DAY 48 g 3     irbesartan (AVAPRO) 150 MG tablet Take 1 tablet (150 mg total) by mouth daily. 90 tablet 3     omeprazole (PRILOSEC) 40 MG capsule TAKE 1 CAPSULE BY MOUTH EVERY DAY 90 capsule 0     oxybutynin (DITROPAN XL) 5 MG ER tablet Take 1 tablet (5 mg total) by mouth daily. 30 tablet 11     therapeutic multivitamin (THERAGRAN) tablet Take 1 tablet by mouth daily.       albuterol (PROAIR HFA) 90 mcg/actuation inhaler Inhale 1-2 puffs every 6 (six) hours as needed for wheezing. 1 Inhaler 1     ALPRAZolam (XANAX) 0.5 MG tablet Take 1 tablet (0.5 mg total) by mouth every 8 (eight) hours as needed for anxiety (1hr before flights). 10 tablet 0     VOLTAREN 1 % Gel APPLY 1/2 GRAM TOPICALLY 3 TIMES A DAY  0     No current facility-administered medications for this visit.        Imaging & Labs reviewed this visit: none  No results found for: HGBA1C  Lab Results   Component Value Date    TSH 1.85 05/30/2017     Lab Results   Component Value Date    CREATININE 0.77 03/12/2019     Lab Results   Component Value Date    K 4.1 03/12/2019         Objective:      There were no vitals filed for this visit.     Vitals taken at home: none    Physical Exam: Not done in context of phone visit. Good insight and judgement.   Kayla Gomez MD      \

## 2021-06-08 NOTE — TELEPHONE ENCOUNTER
She will take the two capsules together as one single dose. No further doses are needed. This is the preventative treatment as recommended.

## 2021-06-08 NOTE — TELEPHONE ENCOUNTER
I will place orders for left breast ultrasound and bilateral diagnostic mammogram as we discussed. Call the radiology department at [phone] to schedule your imaging test.    Also it looks like she was inquiring about med refills of omeprazole and Lexapro which are available by calling her pharmacy.     I will send in an Rx for benzodiazepine for flight anxiety (see additional encounter for this call).  Thanks,  Kayla Gomez MD

## 2021-06-08 NOTE — PROGRESS NOTES
"Natalie Walters is a 75 y.o. female who is being evaluated via a billable telephone visit.      The patient has been notified of following:     \"This telephone visit will be conducted via a call between you and your physician/provider. We have found that certain health care needs can be provided without the need for a physical exam.  This service lets us provide the care you need with a short phone conversation.  If a prescription is necessary we can send it directly to your pharmacy.  If lab work is needed we can place an order for that and you can then stop by our lab to have the test done at a later time.    Telephone visits are billed at different rates depending on your insurance coverage. During this emergency period, for some insurers they may be billed the same as an in-person visit.  Please reach out to your insurance provider with any questions.    If during the course of the call the physician/provider feels a telephone visit is not appropriate, you will not be charged for this service.\"    Patient has given verbal consent to a Telephone visit? Yes    What phone number would you like to be contacted at? [phone]    Patient would like to receive their AVS by AVS Preference: Ramya.      Sigrid Mayorga LPN    "

## 2021-06-08 NOTE — PROGRESS NOTES
"Natalie Walters is a 75 y.o. female who is being evaluated via a billable telephone visit.      The patient has been notified of following:     \"This telephone visit will be conducted via a call between you and your physician/provider. We have found that certain health care needs can be provided without the need for a physical exam.  This service lets us provide the care you need with a short phone conversation.  If a prescription is necessary we can send it directly to your pharmacy.  If lab work is needed we can place an order for that and you can then stop by our lab to have the test done at a later time.    Telephone visits are billed at different rates depending on your insurance coverage. During this emergency period, for some insurers they may be billed the same as an in-person visit.  Please reach out to your insurance provider with any questions.    If during the course of the call the physician/provider feels a telephone visit is not appropriate, you will not be charged for this service.\"    Patient has given verbal consent to a Telephone visit? Yes    What phone number would you like to be contacted at? [phone]    Patient would like to receive their AVS by AVS Preference: Ramya.    Sigrid Mayorga LPN    "

## 2021-06-09 NOTE — TELEPHONE ENCOUNTER
Refill Approved    Rx renewed per Medication Renewal Policy. Medication was last renewed on 5/9/19.    Rebecca Christopher, [facility] Connection Triage/Med Refill 6/18/2020     Requested Prescriptions   Pending Prescriptions Disp Refills     oxybutynin (DITROPAN XL) 5 MG ER tablet [Pharmacy Med Name: OXYBUTYNIN CL ER 5 MG TABLET] 30 tablet 4     Sig: TAKE 1 TABLET BY MOUTH EVERY DAY       Urinary Incontinence Medications Refill Protocol Passed - 6/17/2020  4:40 PM        Passed - PCP or prescribing provider visit in past 12 months       Last office visit with prescriber/PCP: 5/9/2019 Kayla Gomez MD OR same dept: 3/11/2020 Misbah Aj MD OR same specialty: 3/11/2020 Misbah Aj MD  Last physical: Visit date not found Last MTM visit: Visit date not found   Next visit within 3 mo: Visit date not found  Next physical within 3 mo: Visit date not found  Prescriber OR PCP: Kayla Gomez MD  Last diagnosis associated with med order: 1. Urge incontinence  - oxybutynin (DITROPAN XL) 5 MG ER tablet [Pharmacy Med Name: OXYBUTYNIN CL ER 5 MG TABLET]; TAKE 1 TABLET BY MOUTH EVERY DAY  Dispense: 30 tablet; Refill: 4    If protocol passes may refill for 12 months if within 3 months of last provider visit (or a total of 15 months).

## 2021-06-09 NOTE — TELEPHONE ENCOUNTER
RN cannot approve Refill Request    RN can NOT refill this medication Protocol failed and NO refill given.      Rebecca Christopher, Care Connection Triage/Med Refill 6/23/2020    Requested Prescriptions   Pending Prescriptions Disp Refills     irbesartan (AVAPRO) 150 MG tablet [Pharmacy Med Name: IRBESARTAN 150 MG TABLET] 90 tablet 3     Sig: TAKE 1 TABLET BY MOUTH EVERY DAY       Angiotensin Receptor Blocker Protocol Failed - 6/23/2020  7:37 AM        Failed - Serum potassium within the past 12 months     No results found for: LN-POTASSIUM          Failed - Serum creatinine within the past 12 months     Creatinine   Date Value Ref Range Status   03/12/2019 0.77 0.60 - 1.10 mg/dL Final             Passed - PCP or prescribing provider visit in past 12 months       Last office visit with prescriber/PCP: 5/9/2019 Kayla Gomez MD OR same dept: 3/11/2020 Misbah Aj MD OR same specialty: 3/11/2020 Misbah Aj MD  Last physical: Visit date not found Last MTM visit: Visit date not found   Next visit within 3 mo: Visit date not found  Next physical within 3 mo: Visit date not found  Prescriber OR PCP: Kayla Gomez MD  Last diagnosis associated with med order: 1. HTN (hypertension)  - irbesartan (AVAPRO) 150 MG tablet [Pharmacy Med Name: IRBESARTAN 150 MG TABLET]; TAKE 1 TABLET BY MOUTH EVERY DAY  Dispense: 90 tablet; Refill: 1    If protocol passes may refill for 12 months if within 3 months of last provider visit (or a total of 15 months).             Passed - Blood pressure filed in past 12 months     BP Readings from Last 1 Encounters:   03/11/20 100/56

## 2021-06-10 NOTE — TELEPHONE ENCOUNTER
Sent patient [employer] App[employer] message to schedule her annual exam. Last seen 6/16/20 virtually for flight anxiety.   Sigrid Mayorga LPN

## 2021-06-10 NOTE — PROGRESS NOTES
ASSESSMENT and PLAN:  1. Osteopenia with high risk of fracture  She reports a hx of osteroporosis in the past and was treated w/ fosamax, but looking at endocrine notes, it was d/c'd b/c of gastritis and stomach ulcer.  I see a GI Referral, but not the EGD.  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  The f/u for her bone density after that is unclear.  Her most recent scan shows high risk for fxr.  We'll get labs and then likely have her see Dr. Pereyra or endocrine to discuss treatment options.  - Parathyroid Hormone Intact  - Comprehensive Metabolic Panel  - Electrophoresis, Protein, Serum  - Thyroid Cascade    2. Left shoulder pain  I suspect arthritis.  She's interested in treatment, so we'll get an xray and from there determine if PT and/or injection would be an option for her.  - XR Shoulder Left 2 or More VWS; Future    3. Memory loss  She saw her neurologist at [place].  She tells me that he thought it was more stress related.  She's feeling overwhelmed w/ her , house and 3 acres.  We discussed looking into more help and she's open to that.  She's also seeing a therapist.    Patient Instructions   Labs and xray today.  Based on the results, we'll determine the best follow up for you.  I'll have our care guide contact you to see if there are any resources available to help you.  Take care!        CHIEF COMPLAINT:  Chief Complaint   Patient presents with     Follow-up     Bone density scan     Shoulder Pain     left shoulder pain x 4 months        HISTORY OF PRESENT ILLNESS:  Natalie Walters is a 72 y.o. female  presenting to the clinic today for bone density follow up.     Osteopenia: She states that she was previously diagnosed with osteoporosis. She was sent to an endocrinologist, Dr. Aldridge, where a parathyroid tumor was found. This occurred in 2009. She thinks at the time she may have been treated with Fosamax does not believe she had side effects while on the medication.  However, the note states that she was stopped on the medication due to acid reflux and a stomach ulcer. She currently does use Symbicort.     Left Shoulder Pain: She has arthritis in her knees bilaterally as well as in her left thumb. Once she gets moving her knee pain improves. She has now developed left shoulder pain. The pain Is most prominent when she reaches above or is reaching off to the side. Her shoulder has been bothering her for about 4 months.     REVIEW OF SYSTEMS:   She is interested in having a follow up for lacunar infarcts  because she feels that her memory has significantly worsened even though her neurologist does not agree. Being her 's caretaker causes her stress especially because he prefers that she be his primary caretaker. She denies her  being abuse. She does currently see a counselor and has for a while now. All other systems are negative.    PFSH:  Her  has very severe CHF.       TOBACCO USE:  History   Smoking Status     Never Smoker   Smokeless Tobacco     Not on file       VITALS:  Vitals:    05/30/17 1137   BP: 128/64   Patient Site: Right Arm   Pulse: 76   Weight: 137 lb 3 oz (62.2 kg)     Wt Readings from Last 3 Encounters:   05/30/17 137 lb 3 oz (62.2 kg)   12/09/16 135 lb (61.2 kg)   11/25/16 135 lb 3.2 oz (61.3 kg)       PHYSICAL EXAM:  Constitutional:  Reveals an alert, pleasant middle aged female.   Vitals:  Noted.    Musculoskeletal: crepitus with ROM in the left shoulder, ROM is normal, Rotator cuff testing normal   Extremities: Extremities normal, atraumatic, no cyanosis or edema   Neurologic: Normal     ADDITIONAL HISTORY SUMMARIZED (2): Reviewed Endocrinology note from 11/6.2009 regarding parathyroid. Reviewed general surgery follow up note from 3/16/10 regarding parathyroidectomy.   DECISION TO OBTAIN EXTRA INFORMATION (1): Accessed Care Everywhere.    RADIOLOGY TESTS (1): Reviewed DXA Bone Density from 3/29/17. Ordered Shoulder XR.   LABS (1):  Ordered labs.   MEDICINE TESTS (1): None.  INDEPENDENT REVIEW (2 each): None.     The visit lasted a total of 16 minutes face to face with the patient. Over 50% of the time was spent counseling and educating the patient about osteopenia.    I, Ena Garcia, am scribing for and in the presence of, Dr. Jocelin Kerr.    I, Dr. Jocelin Kerr, personally performed the services described in this documentation, as scribed by Ena Garcia in my presence, and it is both accurate and complete.    MEDICATIONS:  Current Outpatient Prescriptions   Medication Sig Dispense Refill     ALPRAZolam (XANAX) 0.5 MG tablet Take 1 tablet (0.5 mg total) by mouth 3 (three) times a day as needed for sleep or anxiety. 15 tablet 0     aspirin 81 MG EC tablet Take 81 mg by mouth daily.       atorvastatin (LIPITOR) 40 MG tablet Take 1 tablet (40 mg total) by mouth daily. 90 tablet 3     budesonide (PULMICORT) 0.5 mg/2 mL nebulizer solution Take 2 mL (0.5 mg total) by nebulization 2 (two) times a day. DX: J45.40 120 mL 3     calcium-vitamin D (CALCIUM-VITAMIN D) 500 mg(1,250mg) -200 unit per tablet Take 1 tablet by mouth daily.       cholecalciferol, vitamin D3, 1,000 unit tablet Take 1,000 Units by mouth daily.       escitalopram oxalate (LEXAPRO) 20 MG tablet Take 1 tablet (20 mg total) by mouth daily. 90 tablet 1     ferrous sulfate 65 mg elemental iron Take 1 tablet by mouth 2 (two) times a week.        irbesartan (AVAPRO) 150 MG tablet Take 1 tablet (150 mg total) by mouth daily. 90 tablet 3     omeprazole (PRILOSEC) 40 MG capsule Take 1 capsule (40 mg total) by mouth Daily before breakfast. 90 capsule 1     SYMBICORT 80-4.5 mcg/actuation inhaler Inhale 2 puffs 2 (two) times a day.  11     therapeutic multivitamin (THERAGRAN) tablet Take 1 tablet by mouth daily.       tolterodine (DETROL LA) 4 MG 24 hr capsule Take 4 mg by mouth as needed.        albuterol (PROVENTIL) 2.5 mg /3 mL (0.083 %) nebulizer solution Take 2.5 mg by  nebulization every 6 (six) hours as needed for wheezing.       No current facility-administered medications for this visit.        Total data points: 5

## 2021-06-10 NOTE — PROGRESS NOTES
The Clinic Care Guide called the patient  today at the request of the PCP to discuss possible clinic care coordination enrollment. Clinic care coordination was described to the patient and immediate needs were discussed. The patient agreed to enrollment in clinic care coordination and future appointments were scheduled for an RN care coordination assessment and an enrollment visit with the primary care physician and care guide. The patient was provided with contact information for the clinic care guide.    PCAM date 6/12/17

## 2021-06-10 NOTE — PROGRESS NOTES
Called the patient per Dr. SARA Kerr's request but was unable to reach the patient. The Care Guide left a message to call back. The Care Guide will call the patient back in one week.

## 2021-06-11 NOTE — PROGRESS NOTES
CC: [facility] enrollment.    HPI: Ngozi met w/ our care co-ordinator.  Goals were developed and reviewed.  Her biggest issue is caring for her  and managing her house.  She was given resources.    O:  Gen: pleasant, NAD      A/P:  Stress at home related to caring for her .  She's enrolled in care coordination now.    See the care guide's note for more details.

## 2021-06-11 NOTE — PROGRESS NOTES
Care Guide Delegation:  1) Due Date: Within 2 week from the RN assessment visit [Insert date]  Delegation: Help the patient to coordinate goal setting visit if not already coordinated. -Complete     2) Due Date: At Goal setting visit      Delegation: Review goals set at PCAM visit and create or add to action plan.   Discuss enrolling the patient's .  (Pt is aware that this is needed)-Discussed with Dr. Salazar/[place] will reach out to enroll spouse in CCC  Provide with Senior Linkage Line information for help in finding dental insurance -Resource given      3) Due Date: after goal setting appointment      Delegation: help arrange patient's 's enrollment if she is agreeable. May need to discuss with his PCP (Dr. Salazar) as well- Discussed    Given info for Senior Linkage line, home instead, [facility], and [facility]

## 2021-06-11 NOTE — TELEPHONE ENCOUNTER
RN cannot approve Refill Request    RN can NOT refill this medication med is not covered by policy/route to provider. Last office visit: 5/9/2019 Kayla Gomez MD Last Physical: Visit date not found Last MTM visit: Visit date not found Last visit same specialty: 3/11/2020 Misbah Aj MD.  Next visit within 3 mo: Visit date not found  Next physical within 3 mo: Visit date not found      Rebecca Christopher, [facility] Connection Triage/Med Refill 9/18/2020    Requested Prescriptions   Pending Prescriptions Disp Refills     budesonide-formoteroL (SYMBICORT) 80-4.5 mcg/actuation inhaler [Pharmacy Med Name: SYMBICORT 80-4.5 MCG INHALER] 10.2 Inhaler 3     Sig: INHALE 2 PUFFS BY MOUTH TWICE DAILY       There is no refill protocol information for this order

## 2021-06-11 NOTE — PROGRESS NOTES
My Clinic Care Coordination Care Plan    [facility]  [address] [place]  [address]  [phone]    My Preferred Method of Contact:  Phone: [phone]    My Primary/Preferred Language:  English    Preferred Learning Style:  Reading information, Face to face discussion, Pictures/Diagrams and Hands on teaching    Emergency Contact: Extended Emergency Contact Information  Primary Emergency Contact: Meño Walters  Address: [address] [place] [address] [place] [address] [facility]  Home Phone: [phone]  Relation: Spouse     PCP:  Jocelin Kerr MD  Specialists:    Care Team            Jocelin Kerr MD PCP - General    [phone]     [facility] Care Coordination Care Guide, Clinic Care Coordination    [facility]           Hospitalizations and/or ED Visits  Most Recent: 8/10/2014     Previous:  10/31/2013  Reason:  Pneumonia    Concerns regarding my health: Stress    Advanced Directive/Living Will: Completed      Natalie elected to enroll in the [facility] Care Coordination.  Natalie was given a copy of the Clinic Care Coordination brochure and full description of how to access their care team both during clinic hours and after hours.   My Care Guide's Name and Phone Number:  [phone]  The Care Guide and myself agreed to be in contact every 2 weeks.      Medication Update  The patient's medication list is up to date per Jocelin Dickey    Health Maintenance and Immunization Update  The patient's preventive health screenings and immunizations are up to date per Dr. Jocelin Kerr.    My Emergency Plan    Emergency Plan:      Preventing Falls    Having a health problem can make you more likely to fall. Taking certain kinds of medicines may also increase your risk of falls. So, improving your health can help you avoid a fall. Work with your healthcare provider to manage health problems and to review your  medicines. If you have your health under control, your risk of falling is lessened.     When to call your healthcare provider  Be sure to call your healthcare provider if you fall. Also call if you have any of these signs or symptoms (someone else may need to point them out to you):    Feeling lightheaded or dizzy more than once a day    Losing your balance often or feeling unsteady on your feet    Feeling numbness in your legs or feet, or noticing a change in the way you walk    Having a steady decline in your memory or mental sharpness            All [facility] clinic patients have access to a Nurse 24 hours a day, 7 days a week.  If you have questions or want advice from a Nurse, please know [facility] is here for you.  You can call your clinic and they will connect you or you can call [facility] at [phone].  [facility] also has Walk In Care clinics in multiple locations.  Call the number listed above for more information about our Walk In Care clinics or visit the [facility] website at [email or web address].    Patient Support  I will ask my emergency contact for help in supporting me in these goals.  Relationship to patient: spouse   Home # : [phone] , best time to call is late morning or early afternoon

## 2021-06-11 NOTE — TELEPHONE ENCOUNTER
Lmtcb: please help patient scheduled appointment.    Verito MINAYA CMA ([facility])

## 2021-06-11 NOTE — PROGRESS NOTES
[facility] Daily Progress     Patient Name: Natalie Walters  Date: 2017  Visit #: 3  PTA visit #:  1  Referral Diagnosis: Acute pain of left shoulder  Referring provider: Jocelin Kerr MD  Visit Diagnosis:     ICD-10-CM    1. Acute pain of left shoulder M25.512    2. Shoulder impingement, left M75.42    3. Shoulder weakness M62.81    4. Essential hypertension I10    5. Moderate persistent asthma J45.40    6. Pure hypercholesterolemia E78.00          Assessment:   Pt was performing SL ER incorrectly. She was able to demonstrate this exercise correctly with verbal and tactile cues.  Subjective report of pain with band shoulder extension and flexion. Pt may have increased the reps too quickly. She was able to perform 10 reps of each exercise pain free today.  Patient is benefitting from skilled physical therapy and is making steady progress toward functional goals.  Patient is appropriate to continue with skilled physical therapy intervention, as indicated by initial plan of care.    Goal Status:Ongoing  Pt. will demonstrate/verbalize independence in self-management of condition in : 6 weeks  Pt. will improve posture : and demonstrate posture with minimal to no cuing;and maintain posture for;5 minutes;in 6 weeks  Patient will decrease : SPADI score;for improved quality of function;in 6 weeks  Pt will: be able to perfrom reaching tasks with pan <2/10; in 6 weeks     Plan / Patient Education:     Continue with initial plan of care.  Progress with home program as tolerated.   Go over HEP. Continue with MT.    Subjective:     Pain Ratin-3     Pt reports that she was feeling pretty good for the first few days.  However, pt reports that she overdid her exercises and got really sore in her shoulder and elbow.  Pt reports that things are bit better lately.      Pt reports that she has been doing her exercises daily and they are challenging for her.        Objective:     Pt tolerates the exercises well  "with moderate cueing for technique.     Left shoulder ROM: (not measured today)   Flexion: 156 degrees, end range pain  AB: 170 degrees, end range pain  ER: 72 degrees, end range pain  IR: T5 mild pain    Pt with decreased left shoulder strength and stability.      Pt with increased tightness over the left UT and pectorals = moderate   Pt with decreased but improved scapular mobility  Pt with minimal left SC joint inferior mobility     Current Exercises:  Exercise #1: UBE  Comment #1: 3'  Exercise #2: Supine shoulder A-Z   Comment #2: Lx1   Exercise #3: S/L shoulder ER  Comment #3: Lx15  Exercise #4: Scap retract   Comment #4: 8  Exercise #5: shoulder bands - flex, ext   Comment #5: orange Bx12 - cues for correct reps and increasing slowly  Exercise #6: supine serratus punches  Comment #6: x10 R  Exercise #7: supine towel roll stretch  Comment #7: 60\" - modified position       Treatment Today    TREATMENT MINUTES COMMENTS   Evaluation     Self-care/ Home management     Manual therapy 14 Left shoulder GH distraction and post mobs - grades 3/4, Left SC joint inferior mobs - grades 1/2  STM to left UT and general shoulder in supine   Left scap distraction and mobilization and STM to left post shoulder in R S/L    Neuromuscular Re-education     Therapeutic Activity     Therapeutic Exercises 14 UBE x 3,' see flow sheet, added to HEP: supine serratus punches x10, supine towel roll stretch   Gait training     Modality__________________                Total 28    Blank areas are intentional and mean the treatment did not include these items.       Kayla Chappell, PT  7/13/2017    "

## 2021-06-11 NOTE — TELEPHONE ENCOUNTER
Refill Approved    Rx renewed per Medication Renewal Policy. Medication was last renewed on 11/28/19.    Rebecca Christopher, [facility] Triage/Med Refill 9/30/2020     Requested Prescriptions   Pending Prescriptions Disp Refills     atorvastatin (LIPITOR) 40 MG tablet [Pharmacy Med Name: ATORVASTATIN 40 MG TABLET] 90 tablet 1     Sig: TAKE 1 TABLET BY MOUTH EVERY DAY       Statins Refill Protocol (Hmg CoA Reductase Inhibitors) Passed - 9/27/2020  6:29 PM        Passed - PCP or prescribing provider visit in past 12 months      Last office visit with prescriber/PCP: 5/9/2019 Kayla Gomez MD OR same dept: 3/11/2020 Misbah Aj MD OR same specialty: 3/11/2020 Misbah Aj MD  Last physical: Visit date not found Last MTM visit: Visit date not found   Next visit within 3 mo: Visit date not found  Next physical within 3 mo: Visit date not found  Prescriber OR PCP: Kayla Gomez MD  Last diagnosis associated with med order: 1. Hypercholesterolemia  - atorvastatin (LIPITOR) 40 MG tablet [Pharmacy Med Name: ATORVASTATIN 40 MG TABLET]; TAKE 1 TABLET BY MOUTH EVERY DAY  Dispense: 90 tablet; Refill: 1    If protocol passes may refill for 12 months if within 3 months of last provider visit (or a total of 15 months).

## 2021-06-11 NOTE — PROGRESS NOTES
[facility] Certification Request    June 27, 2017      Patient: Natalie Walters  MR Number: [id number]  YOB: 1945  Date of Visit: 6/27/2017      Dear Dr. Jocelin Kerr:    Thank you for this referral.   We are seeing Natalie Walters for Physical Therapy of her left shoulder.    Medicare and/or Medicaid requires physician review and approval of the treatment plan. Please review the plan of care and verify that you agree with the therapy plan of care by co-signing this note.      Plan of Care  Authorization / Certification Start Date: 06/27/17  Authorization / Certification End Date: 09/25/17  Communication with: Referral Source  Patient Related Instruction: Nature of Condition;Treatment plan and rationale;Self Care instruction;Basis of treatment;Body mechanics;Posture;Expected outcome  Times per Week: 2  Number of Weeks: 6  Number of Visits: 12 - PRN   Discharge Planning: Pt will be dsicharged upon meeting goals or plateau of progress   Therapeutic Exercise: ROM;Stretching;Strengthening  Neuromuscular Reeducation: kinesio tape;posture  Manual Therapy: soft tissue mobilization;myofascial release;joint mobilization;muscle energy;strain counterstrain  Modalities: electrical stimulation;TENS;ultrasound;cold pack;hot pack    Goals:  Pt. will demonstrate/verbalize independence in self-management of condition in : 6 weeks  Pt. will improve posture : and demonstrate posture with minimal to no cuing;and maintain posture for;5 minutes;in 6 weeks  Patient will decrease : SPADI score;for improved quality of function;in 6 weeks  Pt will: be able to perfrom reaching tasks with pan <2/10; in 6 weeks       If you have any questions or concerns, please don't hesitate to call.    Sincerely,      Kayla Chappell, PT        Physician recommendation:     ___ Follow therapist's recommendation        ___ Modify therapy      *Physician co-signature indicates they certify the need for these services furnished within this  plan and while under their care.        [facility]   Shoulder Initial Evaluation    Patient Name: Natalie Walters  Date of evaluation: 6/27/2017  Referral Diagnosis:    Referring provider: Jocelin Kerr MD  Visit Diagnosis:     ICD-10-CM    1. Acute pain of left shoulder M25.512    2. Shoulder impingement, left M75.42    3. Shoulder weakness M62.81        Assessment:      Pt. is appropriate for skilled PT intervention as outlined in the Plan of Care (POC).    Goals:  Pt. will demonstrate/verbalize independence in self-management of condition in : 6 weeks  Pt. will improve posture : and demonstrate posture with minimal to no cuing;and maintain posture for;5 minutes;in 6 weeks  Patient will decrease : SPADI score;for improved quality of function;in 6 weeks  Pt will: be able to perfrom reaching tasks with pan <2/10; in 6 weeks     Patient's expectations/goals are realistic.    Barriers to Learning or Achieving Goals:  No Barriers.       Plan / Patient Instructions:        Plan of Care:   Authorization / Certification Start Date: 06/27/17  Authorization / Certification End Date: 09/25/17  Communication with: Referral Source  Patient Related Instruction: Nature of Condition;Treatment plan and rationale;Self Care instruction;Basis of treatment;Body mechanics;Posture;Expected outcome  Times per Week: 2  Number of Weeks: 6  Number of Visits: 12 - PRN   Discharge Planning: Pt will be dsicharged upon meeting goals or plateau of progress   Therapeutic Exercise: ROM;Stretching;Strengthening  Neuromuscular Reeducation: kinesio tape;posture  Manual Therapy: soft tissue mobilization;myofascial release;joint mobilization;muscle energy;strain counterstrain  Modalities: electrical stimulation;TENS;ultrasound;cold pack;hot pack    POC and pathology of condition were reviewed with patient.  Pt. is in agreement with the Plan of Care  A Home Exercise Program (HEP) was initiated today.  Pt. was instructed in exercises by PT  and patient was given a handout with detailed instructions.  Plan for next visit: focus on MT and strength   .   Subjective:         History of Present Illness:    Natalie is a 72 y.o. female who presents to therapy today with complaints of left shoulder pain. Date of onset/duration of symptoms is 2017. Onset was sudden with reaching up to the shower head and twisting her arm. Symptoms are getting better. She denies history of similar symptoms. She describes their previous level of function as not limited    Pt reports that her pain is not too bad but there is some cracking with reaching overhead.  Pt reports that she has no pain at rest.      Pt reports that she feels the pain in the left front and back of the shoulder just below the joint.      Pain Ratin  Pain rating at best: 0  Pain rating at worst: 4  Pain description: pain and sharp    Functional limitations are described as occurring with:   lifting  reaching overhead and behind back  laying on the left side for sleeping     Patient reports benefit from:  anti-inflammatory       Objective:      Note: Items left blank indicates the item was not performed or not indicated at the time of the evaluation.    Patient Outcome Measures :    Shoulder Pain and Disability Index (SPADI) in %: 15   Scores range from 0-100%, where a score of 0% represents minimal pain and maximal function. The minimal clincically important difference is a score reduction of 10%.    Shoulder Examination  1. Acute pain of left shoulder     2. Shoulder impingement, left     3. Shoulder weakness       Involved side: Left  Posture Observation:      General sitting posture is  fair.  Cervical Clearing: Normal    Shoulder/Elbow ROM   Date: 17     Shoulder and Elbow ROM ( )   AROM in degrees AROM in degrees AROM in degrees    Right Left Right Left Right Left   Shoulder Flexion (0-180 ) WNL WNL, p at end rg       Shoulder Abduction (0-180 ) WNl WNL, pain 90 to end rg       Shoulder  Extension (0-60 )         Shoulder ER (0-90 ) WNL WNL, p at end rg       Shoulder IR (0-70 )         Shoulder IR/Ext WNL WNL, p at end rg       Elbow Flexion (150 )         Elbow Extension (0 )           Shoulder/Elbow Strength    Date: 6/27/17     Shoulder/Elbow Strength (/5)  Manual Muscle Test (MMT) MMT MMT MMT    Right Left Right Left Right Left   Shoulder Flexion 5 4       Supraspinatus 5 4       Shoulder Abduction 5 4       Shoulder Extension 5 4       Shoulder External Rotation 5 4       Shoulder Internal Rotation 5 4       Elbow Flexion 5 5       Elbow Extension 5 5       Other:         Other:           Palpation:   Increased tenderness over the left anterior shoulder/biceps tendon   Increased tenderness over the left post shoulder/teres     Joint Assessment:  Sternoclavicular Joint Assessment: Hypomobile with inferior motion, clavicle sits elevated   Acromioclavicular Joint Assessment: WNL  Scapulothoracic Joint Assessment: Hypomobile.  Glenohumeral Joint Assessment:Posterior Capsule tightness., Inferior Capsule tightness.    Shoulder Special Tests     Impingement Cluster Right (+/-) Left (+/-) Rotator Cuff Tests Right (+/-) Left (+/-)   Romero-Luis - + Drop Arm Sign     Painful Arc - + Hornblowers     Infraspinatus Test - + ERLS - -   AC Tests Right (+/-) Left (+/-) IRLS - -   Active Compression   Labral Tests Right (+/-) Left (+/-)   Crossbody Adduction - - Biceps Load Test II     AC Resisted Extension   Jerk Test     GH Instability Tests Right (+/-) Left (+/-) Nadege Test     Sulcus Sign - - Biceps Tests Right (+/-) Left (+/-)   Apprehension   Speed - -   Relocation   Lawrence s - -   Other:   Other:     Other:   Other:         Treatment Today    TREATMENT MINUTES COMMENTS   Evaluation 25 Low    Self-care/ Home management     Manual therapy 12 Left shoulder GH distraction and post mobs - grades 3/4, Left SC joint inferior mobs - grades 1/2  STM to left UT and general shoulder in supine   Left scap  distraction and mobilization and STM to left post shoulder in R S/L    Neuromuscular Re-education     Therapeutic Activity     Therapeutic Exercises 18 Pathology, POC, HEP, posture   See flow sheet    Gait training     Modality__________________                Total 55    Blank areas are intentional and mean the treatment did not include these items.     PT Evaluation Code: (Please list factors)  Patient History/Comorbidities: HTN, asthma   Examination: pain with ROM, weakness, tenderness, + impingement testing   Clinical Presentation: stable and uncomplicated    Clinical Decision Making: low     Patient History/  Comorbidities Examination  (body structures and functions, activity limitations, and/or participation restrictions) Clinical Presentation Clinical Decision Making (Complexity)   No documented Comorbidities or personal factors 1-2 Elements Stable and/or uncomplicated Low   1-2 documented comorbidities or personal factor 3 Elements Evolving clinical presentation with changing characteristics Moderate   3-4 documented comorbidities or personal factors 4 or more Unstable and unpredictable High          Kayal Chappell  6/27/2017  3:36 PM

## 2021-06-11 NOTE — PROGRESS NOTES
Going on vacation in a few weeks to see the grandchildren. Her  is staying home due to his heart condition.

## 2021-06-11 NOTE — PROGRESS NOTES
[facility] Daily Progress     Patient Name: Natalie Walters  Date: 2017  Visit #: 2  PTA visit #:  1  Referral Diagnosis: Acute pain of left shoulder  Referring provider: Jocelin Kerr MD  Visit Diagnosis:     ICD-10-CM    1. Acute pain of left shoulder M25.512    2. Shoulder impingement, left M75.42    3. Shoulder weakness M62.81    4. Essential hypertension I10    5. Moderate persistent asthma J45.40    6. Pure hypercholesterolemia E78.00          Assessment:   Pt was performing SL ER incorrectly. She was able to demonstrate this exercise correctly with verbal and tactile cues.  Subjective report of pain with band shoulder extension and flexion. Pt may have increased the reps too quickly. She was able to perform 10 reps of each exercise pain free today.  Patient is benefitting from skilled physical therapy and is making steady progress toward functional goals.  Patient is appropriate to continue with skilled physical therapy intervention, as indicated by initial plan of care.    Goal Status:Ongoing  Pt. will demonstrate/verbalize independence in self-management of condition in : 6 weeks  Pt. will improve posture : and demonstrate posture with minimal to no cuing;and maintain posture for;5 minutes;in 6 weeks  Patient will decrease : SPADI score;for improved quality of function;in 6 weeks  Pt will: be able to perfrom reaching tasks with pan <2/10; in 6 weeks     Plan / Patient Education:     Continue with initial plan of care.  Progress with home program as tolerated.   Go over HEP. Continue with MT.    Subjective:   Pt reports some increased pain when performing the band exercises.  Pain Ratin  currently      Objective:     Left shoulder ROM:  Flexion: 156 degrees, end range pain  AB: 170 degrees, end range pain  ER: 72 degrees, end range pain  IR: T5 mild pain    Treatment Today    TREATMENT MINUTES COMMENTS   Evaluation     Self-care/ Home management     Manual therapy 10 STM/MFR to L  anterior and posterior shoulder   Neuromuscular Re-education     Therapeutic Activity     Therapeutic Exercises 20 UBE x 3,' see flow sheet, added to HEP: supine serratus punches x10, supine towel roll stretch   Gait training     Modality__________________                Total 30    Blank areas are intentional and mean the treatment did not include these items.       Tabitha Esquivel PTA,CLT  7/6/2017

## 2021-06-11 NOTE — PROGRESS NOTES
Called the patient to reschedule her Goal Setting with her PCP, Dr. Jocelin Kerr. The new appointment is 6/20.

## 2021-06-12 NOTE — PROGRESS NOTES
[facility] Daily Progress     Patient Name: Natalie Walters  Date: 2017  Visit #: 7  PTA visit #:  1  Referral Diagnosis: Acute pain of left shoulder  Referring provider: Jocelin Kerr MD  Visit Diagnosis:     ICD-10-CM    1. Acute pain of left shoulder M25.512    2. Shoulder impingement, left M75.42    3. Shoulder weakness M62.81          Assessment:     Patient is benefitting from skilled physical therapy and is making steady progress toward functional goals.  Patient is appropriate to continue with skilled physical therapy intervention, as indicated by initial plan of care.    Pt noted decreased pain today after MT.      Goal Status:Ongoing  Pt. will demonstrate/verbalize independence in self-management of condition in : 6 weeks  Pt. will improve posture : and demonstrate posture with minimal to no cuing;and maintain posture for;5 minutes;in 6 weeks  Patient will decrease : SPADI score;for improved quality of function;in 6 weeks  Pt will: be able to perfrom reaching tasks with pan <2/10; in 6 weeks     Plan / Patient Education:     Continue with initial plan of care.  Progress with home program as tolerated.     Continue for 2 more visits with more consistency and then consider independence.   Check the results of estim     Subjective:     Pain Ratin    Pt reports that she feels like she is feeling progress with her shoulder and having less pain overall.  Pt notes that she still has some pain particularly with reaching up high or sleeping on that shoulder.    Pt is pleased with her overall progress. Pt feeling 65-70% better.       Pt reports that she has been doing her exercises most days.      Objective:     Pt tolerates the exercises well with moderate cueing for technique.     Left shoulder AROM: (not measured today)   Flexion: 165 degrees  AB: 170 degrees  ER: 72 degrees, min pain  IR: T4 mild pain    Pt with increasing left shoulder strength and stability.   This continues to be  "addressed by her HEP.      Pt with decreasing tightness over the left UT and pectorals = minimal   Pt with decreased to minimal scapular deficits now   Pt with improved left SC joint inferior mobility       Current Exercises:  Exercise #1: UBE  Comment #1: 3'  Exercise #2: Supine shoulder A-Z   Comment #2: Lx1 with 1#  Exercise #3: S/L shoulder ER  Comment #3: Lx20 with 1#  Exercise #4: Scap retract   Comment #4: 8  Exercise #5: shoulder bands - flex, ext   Comment #5: orange - ext and flex B x20  Exercise #6: supine serratus punches  Comment #6: x10 R  Exercise #7: supine towel roll stretch  Comment #7:  breathing x60\"  60\" - modified position       Treatment Today    TREATMENT MINUTES COMMENTS   Evaluation     Self-care/ Home management     Manual therapy 14 Left shoulder GH distraction and post mobs - grades 3/4, Left SC joint inferior mobs - grades 1/2  STM to left UT and general shoulder in supine   Left scap distraction and mobilization and STM to left post shoulder in R S/L    Neuromuscular Re-education     Therapeutic Activity     Therapeutic Exercises 3 UBE x3 min     Gait training     Modality: Estim  10  (13) Left shoulder IFC - high/sweep  Pt seated + set-up x3 min               Total 30    Blank areas are intentional and mean the treatment did not include these items.       Kayla Chappell, PT  8/24/2017  "

## 2021-06-12 NOTE — PROGRESS NOTES
[facility] Daily Progress     Patient Name: Natalie Walters  Date: 8/10/2017  Visit #: 5  PTA visit #:  1  Referral Diagnosis: Acute pain of left shoulder  Referring provider: Jocelin Kerr MD  Visit Diagnosis:     ICD-10-CM    1. Acute pain of left shoulder M25.512    2. Shoulder impingement, left M75.42    3. Shoulder weakness M62.81          Assessment:     Patient is benefitting from skilled physical therapy and is making steady progress toward functional goals.  Patient is appropriate to continue with skilled physical therapy intervention, as indicated by initial plan of care.    Pt noted decreased pain today after MT.      Goal Status:Ongoing  Pt. will demonstrate/verbalize independence in self-management of condition in : 6 weeks  Pt. will improve posture : and demonstrate posture with minimal to no cuing;and maintain posture for;5 minutes;in 6 weeks  Patient will decrease : SPADI score;for improved quality of function;in 6 weeks  Pt will: be able to perfrom reaching tasks with pan <2/10; in 6 weeks     Plan / Patient Education:     Continue with initial plan of care.  Progress with home program as tolerated.   Go over HEP. Continue with MT.    Subjective:     Pain Ratin    Pt reports that she feels like she is feeling progress with her shoulder and having less pain overall.  Pt notes that she still has some pain particularly with reaching up high or sleeping on that shoulder.  Pt is pleased with her overall progress.       Pt reports that she has been doing her exercises most days.      Objective:     Pt tolerates the exercises well with moderate cueing for technique.     Left shoulder AROM: (not measured today)   Flexion: 165 degrees  AB: 170 degrees  ER: 72 degrees, min pain  IR: T4 mild pain    Pt with increasing left shoulder strength and stability.   This continues to be addressed by her HEP.    Pt with increased tightness over the left UT and pectorals = moderate   Pt with increasing  "left scapular mobility with minimal deficits now   Pt with minimal  Decreased left SC joint inferior mobility   This all improved with MT     Current Exercises:  Exercise #1: UBE  Comment #1: 3'  Exercise #2: Supine shoulder A-Z   Comment #2: Lx1 with 1#  Exercise #3: S/L shoulder ER  Comment #3: Lx15  Exercise #4: Scap retract   Comment #4: 8  Exercise #5: shoulder bands - flex, ext   Comment #5: orange ext Bx20   Exercise #6: supine serratus punches  Comment #6: x10 R  Exercise #7: supine towel roll stretch  Comment #7:  breathing x60\"  60\" - modified position       Treatment Today    TREATMENT MINUTES COMMENTS   Evaluation     Self-care/ Home management     Manual therapy 14 Left shoulder GH distraction and post mobs - grades 3/4, Left SC joint inferior mobs - grades 1/2  STM to left UT and general shoulder in supine   Left scap distraction and mobilization and STM to left post shoulder in R S/L    Neuromuscular Re-education     Therapeutic Activity     Therapeutic Exercises 14 See flow sheet    Gait training     Modality__________________                Total 28    Blank areas are intentional and mean the treatment did not include these items.       Kayla Chappell, PT  8/10/2017  "

## 2021-06-12 NOTE — PROGRESS NOTES
Cut her leg last night while gardening, she has a call in to RN triage regarding care of the laceration. Her tetanus is current.  She has followed up with ENT regarding the fluid in her ears and they have suggested tubes if the steroids do not help. She is flying on 9/13 to [place].

## 2021-06-12 NOTE — PROGRESS NOTES
Attempt 1-Care Guide called patient.  If this patient is returning our call please transfer to Stephie at ext. [phone].

## 2021-06-12 NOTE — PROGRESS NOTES
Attempt 1-Care Guide called patient.  If this patient is returning our call please transfer to Stephie at ext. [phone].

## 2021-06-12 NOTE — PROGRESS NOTES
"Clinic Note    Assessment:     Assessment and Plan:  1. Leg wound, right    -I applied a small amount of Vaseline and a new band-aid to the patient's wound. This wound should heal nicely with regular dressing changes, as long as she keeps it clean. She does not have diabetes. See instructions below.        Patient Instructions   -Change leg dressing twice daily using band aids. Use Vaseline instead of antibiotic cream.    -Check for infection (redness, streaking, fever) and schedule a follow up appointment if the wound starts to look worse.     -OK to get wet in shower.     Return if symptoms worsen or fail to improve.         Subjective:      Natalie Walters is a 72 y.o. female who is here for a leg wound.     She was gardening yesterday when she accidentally cut herself with a garden kate. Her wound in located on the lateral aspect of her right calf. She says that her  tried to help her, but that he then accidentally stepped on her right foot, leaving behind a large bruise.     She says that the cut bled for \"quite a long time.\" And that she wanted to come in and have the wound evaluated to make sure that it wasn't infected, and to see if it needed to be cleaned.     Patient says that she is up to date on her tetanus.     The following portions of the patient's history were reviewed and updated as appropriate: Problem List, Allergies, and Medications.     Review of Systems:    Review is negative except for what is mentioned above.     Social Hx:    History   Smoking Status     Never Smoker   Smokeless Tobacco     Not on file         Objective:     Vitals:    08/30/17 1319   BP: 118/74   Pulse: 72   Weight: 139 lb 11.2 oz (63.4 kg)       Exam:    General: No apparent distress. Calm. Alert and Oriented X3. Pt behavior is appropriate.  Skin: There is a small, 1cm laceration on the lateral portion of the patients right calf. No evidence of tunneling, I dont think that there is any evidence that the wound is " deeper than superficial in nature. No streaking, no odor, or exudative drainage.       Patient Active Problem List   Diagnosis     Serum Total Cholesterol Was Elevated     Dysthymic Disorder     Esophageal Reflux     Hypertension     Moderate persistent asthma     Osteopenia with high risk of fracture     Current Outpatient Prescriptions   Medication Sig Dispense Refill     aspirin 81 MG EC tablet Take 81 mg by mouth daily.       atorvastatin (LIPITOR) 40 MG tablet Take 1 tablet (40 mg total) by mouth daily. 90 tablet 3     budesonide (PULMICORT) 0.5 mg/2 mL nebulizer solution Take 2 mL (0.5 mg total) by nebulization 2 (two) times a day. DX: J45.40 120 mL 3     calcium-vitamin D (CALCIUM-VITAMIN D) 500 mg(1,250mg) -200 unit per tablet Take 1 tablet by mouth daily.       cholecalciferol, vitamin D3, 1,000 unit tablet Take 1,000 Units by mouth daily.       escitalopram oxalate (LEXAPRO) 20 MG tablet TAKE 1 TABLET (20 MG TOTAL) BY MOUTH DAILY. 90 tablet 2     ferrous sulfate 65 mg elemental iron Take 1 tablet by mouth 2 (two) times a week.        fluticasone (FLONASE) 50 mcg/actuation nasal spray 1 spray into each nostril daily.       irbesartan (AVAPRO) 150 MG tablet Take 1 tablet (150 mg total) by mouth daily. 90 tablet 3     omega-3 fatty acids (FISH OIL CONCENTRATE) 1,000 mg cap Take 1 tablet by mouth daily.       omeprazole (PRILOSEC) 40 MG capsule TAKE 1 CAPSULE (40 MG TOTAL) BY MOUTH DAILY BEFORE BREAKFAST. 90 capsule 0     predniSONE (DELTASONE) 20 MG tablet Take 20 mg by mouth 2 (two) times a day.       SYMBICORT 80-4.5 mcg/actuation inhaler Inhale 2 puffs 2 (two) times a day.  11     therapeutic multivitamin (THERAGRAN) tablet Take 1 tablet by mouth daily.       tolterodine (DETROL LA) 4 MG 24 hr capsule Take 4 mg by mouth as needed.        No current facility-administered medications for this visit.          Sunil Olvera CNP (Rob)    8/30/2017       I saw Natalie Walters w/ NORMAN Olvera.  We discussed the  plan of care and I agree w/ the plan as documented here.--Jocelin Kerr MD.

## 2021-06-12 NOTE — PROGRESS NOTES
[facility] Daily Progress     Patient Name: Natalie Walters  Date: 2017  Visit #: 8  PTA visit #:  1  Referral Diagnosis: Acute pain of left shoulder  Referring provider: Jocelin Kerr MD  Visit Diagnosis:     ICD-10-CM    1. Acute pain of left shoulder M25.512    2. Shoulder impingement, left M75.42    3. Shoulder weakness M62.81    4. Essential hypertension I10    5. Moderate persistent asthma J45.40    6. Osteopenia with high risk of fracture M85.80    7. 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    Patient is now ready to try independent sx management and HEP.  Pt will follow-up with PT or her physician if her sx do not continue to resolve over the next 30 days.       Goal Status:Ongoing  Pt. will demonstrate/verbalize independence in self-management of condition in : 6 weeks;Met  Pt. will improve posture : and demonstrate posture with minimal to no cuing;and maintain posture for;5 minutes;in 6 weeks;Met  Patient will decrease : SPADI score;for improved quality of function;in 6 weeks;Comment  Comment: Not Assessed   Pt will: be able to perfrom reaching tasks with pan <2/10; in 6 weeks; Progressing toward     Plan / Patient Education:     Patient to try independent sx management and HEP.  PT will hold the chart for 30 days and then discharge the pt of she does not return.      Subjective:     Pain Ratin    Pt reports that she feels like she is feeling progress with her shoulder and having less pain overall.  Pt notes that she still has some pain particularly with reaching up high or sleeping on that shoulder.  Pt reports that she feels like the estim did help some with making progress.      Pt reports that she has the pain maybe 5x per day but the pain is short lived.      Pt is pleased with her overall progress. Pt feeling  "65-70% better.       Pt reports that she has been doing her exercises most days.      Objective:     Pt has progressed slowly but has increased function and strength with decreased pain.      Left shoulder AROM:   Flexion: 165 degrees  AB: 170 degrees, min p  ER: 85degrees, min pain  IR: T4, min pain    Left Shoulder Strength:  Flex = 4+/5  ABD = 4+/5  ER = 4/5   IR = 5/5     Palpation:  Pt with decreased tightness over the left UT and pectorals = minimal   Pt with improved scapular mobility = WNL   Pt with improved left SC joint inferior mobility = WNL         Current Exercises:  Exercise #1: UBE  Comment #1: 3'  Exercise #2: Supine shoulder A-Z   Comment #2: Lx1 with 1#  Exercise #3: S/L shoulder ER  Comment #3: Lx20 with 1#  Exercise #4: Scap retract   Comment #4: 8  Exercise #5: shoulder bands - flex, ext   Comment #5: orange - ext and flex B x20  Exercise #6: supine serratus punches  Comment #6: x10 R  Exercise #7: supine towel roll stretch  Comment #7:  breathing x60\"  60\" - modified position       Treatment Today    TREATMENT MINUTES COMMENTS   Evaluation     Self-care/ Home management     Manual therapy 14 Left shoulder GH distraction and post mobs - grades 3/4, Left SC joint inferior mobs - grades 1/2  STM to left UT and general shoulder in supine   Left scap distraction and mobilization and STM to left post shoulder in R S/L    Neuromuscular Re-education     Therapeutic Activity     Therapeutic Exercises 4 Reassessment of sx     Gait training     Modality: Estim  10  (13) Left shoulder IFC - high/sweep  Pt seated + set-up x3 min               Total 31    Blank areas are intentional and mean the treatment did not include these items.       Kayla Chappell, PT  8/28/2017  "

## 2021-06-12 NOTE — TELEPHONE ENCOUNTER
Who is calling:  Patient  Reason for Call:  Pt needs to be seen this Friday 10/23/2020 while her daughter in law is in town. Please advise.   Date of last appointment with primary care: N/A  Okay to leave a detailed message: Yes

## 2021-06-12 NOTE — PROGRESS NOTES
Progress Note    Reason for Visit:  Chief Complaint     Osteopenia          Progress Note:    HPI:       This pleasant 72-year-old female patient is seen in consultation at the request of the primary care physician because of osteopenia thank you for referring this pleasant 72-year-old female patient who had a bone DEXA scan showed T score at the spine is -1.9 at the left hip -2 at the right -1.9.    Her risk of major osteoporotic fracture is 12.9%.    And 30% at the hip.    The patient had broken toe in the past she has lost 1 inch in height she has a history of multiple lacunar infarcts.    The patient takes a baby aspirin to a milligram once a day.    She does not smoke or drink she is  and has 2 children she looks after her .    She had parathyroidectomy in the past back in 2009.                  patient Profile:  71 y.o. female, postmenopausal, is here for the first bone density test.   History of fractures - None. Family history of osteoporosis - None.  Family history of hip fracture: None. Smoking history - No. Osteoporosis treatment past -  No. Osteoporosis treatment current - No.  Chronic medical problems - None. High risk medications -  Steroids;  Yes, in the Past.        Assessment:     1. The spine bone density L1-L4 with T-score -1.9.  2. Femoral bone densities show left femoral neck T- score -2.0 and right femoral neck T-score -1.9.  3. Trabecular bone score indicates good trabecular bone architecture.        71 y.o. female with LOW BONE DENSITY (OSTEOPENIA) and HIGH fracture risk with major osteoporotic fracture risk 12.9% and hip fracture risk 3.0%.       Review of Systems:    Nervous System: No headache, dizziness, fainting or memory loss. No tingling sensation of hand or feet.  Ears: No hearing loss or ringing in the ears  Eyes: No blurring of vision, redness, itching or dryness.  Nose: No nosebleed or loss of smell  Mouth: No mouth sores or loss of taste  Throat: No hoarseness or  difficulty swallowing  Neck: No enlarged thyroid or lymph nodes.  Heart: No chest pain, palpitation or irregular heartbeat. No swelling of hands or feet  Lungs: No shortness of breath, cough, night sweats, wheezing or hemoptysis.  Gastrointestinal: No nausea or vomiting, constipation or diarrhea.  No acid reflux, abdominal pain or blood in stools.  Kidney/Bladdr: No polyuria, polydipsia, nocturia or hematuria.  Genital/Sexual: No loss of libido  Skin: No rash, hair loss or hirsutism.  No abnormal striae  Muscles/Joints/Bones: No morning stiffness, muscle aches and pain or loss of height.    Current Medications:  Current Outpatient Prescriptions   Medication Sig     aspirin 81 MG EC tablet Take 81 mg by mouth daily.     atorvastatin (LIPITOR) 40 MG tablet Take 1 tablet (40 mg total) by mouth daily.     budesonide (PULMICORT) 0.5 mg/2 mL nebulizer solution Take 2 mL (0.5 mg total) by nebulization 2 (two) times a day. DX: J45.40     calcium-vitamin D (CALCIUM-VITAMIN D) 500 mg(1,250mg) -200 unit per tablet Take 1 tablet by mouth daily.     cholecalciferol, vitamin D3, 1,000 unit tablet Take 1,000 Units by mouth daily.     escitalopram oxalate (LEXAPRO) 20 MG tablet TAKE 1 TABLET (20 MG TOTAL) BY MOUTH DAILY.     ferrous sulfate 65 mg elemental iron Take 1 tablet by mouth 2 (two) times a week.      fluticasone (FLONASE) 50 mcg/actuation nasal spray 1 spray into each nostril daily.     irbesartan (AVAPRO) 150 MG tablet Take 1 tablet (150 mg total) by mouth daily.     omega-3 fatty acids (FISH OIL CONCENTRATE) 1,000 mg cap Take 1 tablet by mouth daily.     omeprazole (PRILOSEC) 40 MG capsule TAKE 1 CAPSULE (40 MG TOTAL) BY MOUTH DAILY BEFORE BREAKFAST.     SYMBICORT 80-4.5 mcg/actuation inhaler Inhale 2 puffs 2 (two) times a day.     therapeutic multivitamin (THERAGRAN) tablet Take 1 tablet by mouth daily.     tolterodine (DETROL LA) 4 MG 24 hr capsule Take 4 mg by mouth as needed.        Patients Active  Problems:  Patient Active Problem List   Diagnosis     Serum Total Cholesterol Was Elevated     Dysthymic Disorder     Esophageal Reflux     Hypertension     Moderate persistent asthma     Osteopenia with high risk of fracture       History:   reports that she has never smoked. She does not have any smokeless tobacco history on file. She reports that she does not drink alcohol.   reports that she has never smoked. She does not have any smokeless tobacco history on file. She reports that she does not drink alcohol. Her drug history is not on file.  History   Smoking Status     Never Smoker   Smokeless Tobacco     Not on file      reports that she has never smoked. She does not have any smokeless tobacco history on file. She reports that she does not drink alcohol. Her drug history is not on file.  History   Sexual Activity     Sexual activity: Not on file     Past Medical History:   Diagnosis Date     Asthma      Chronic cough      Essential tremor      Pneumonia 08/10/2014    pt states this is 5th occurrence     Family History   Problem Relation Age of Onset     Pancreatic cancer Mother      Arthritis Brother      Breast cancer Maternal Grandmother      Heart attack Paternal Grandfather      Colon cancer Neg Hx      Past Medical History:   Diagnosis Date     Asthma      Chronic cough      Essential tremor      Pneumonia 08/10/2014    pt states this is 5th occurrence     Past Surgical History:   Procedure Laterality Date     PARATHYROID GLAND SURGERY      gland removed with benign tumor     [place] NASAL/SINUS ENDOSCOPY,OPEN MAXILL SINUS      Description: Nasal Endoscopy With Maxillary Antrostomy;  Recorded: 08/20/2013;     [place] REMOVE TONSILS/ADENOIDS,<11 Y/O      Description: Tonsillectomy With Adenoidectomy;  Recorded: 08/20/2013;     [place] REVISION OF CERVIX,NON OBSTETRICAL      Description: Cervical Cerclage (Non-OB);  Recorded: 08/20/2013;     SINUS SURGERY  05/2015    [facility]; 3 hr sugery, bones removed       Vitals    "height is 5' 1.25\" (1.556 m) and weight is 138 lb (62.6 kg). Her blood pressure is 126/80.         Exam  General appearance: The patient looked well, not in acute distress.  Eyes: no evidence of thyroid eye disease.   Retinal exam: No evidence of diabetic retinopathy.  Mouth and Throat: Normal  Neck: No evidence of thyromegaly, enlarged lymph node or tenderness  Chest: Trachea is central. Chest is clear to auscultation and percussion. Breat sounds are normal.  Cardiovascular exam: JVP is not raised. Heart sounds are normal, no murmurs or rub  Peripheral pulses are palpable.   Abdomen: No masses or tenderness.    Back: No vertebral tenderness or kyphosis.  Extremities: No evidence of leg edema.   Skin: Normal to touch.  No abnormal striae  Neurologic exam:  Visual fields are intact by confrontation, grossly intact. No evidence of peripheral neuropathy.  Detailed foot exam normal.        Diagnosis:  No diagnosis found.    Orders:   No orders of the defined types were placed in this encounter.        Assessment and Plan:  Osteopenia with high risk of fracture.  I discussed the pathophysiology of the disease with the patient.  The patient had taken Fosamax in the past but that caused stomach ulcer.    She is taking calcium 1 pill a day vitamin D 1000 units a day serum protein electrophoresis is negative.    Parathyroid hormone is normal at 59 calcium is also normal at 9.9 TSH 1.85.    The patient also takes omeprazole 40 mg.    I did advise her that she should be on Prolia 60 mg subcutaneously every 6 month we will contact insurance.  I do not see any other drug that is suitable in this case.    She has previous history of CVA lacunar infarcts on Lipitor 40 mg once a day.    She does have hypertension on Avapro 150 mg daily blood pressures were controlled 126/80.    She does take Lexapro for depression iron 6065 mg twice a week.    She takes Detrol LA 4 mg twice a week.    She had repeated pneumonia and has " bronchiectasis.  And she has machine for physiotherapy for that.    Patient will return to clinic in 1 year I did spend 60 minutes with the patient more than 50% was spent on counseling and managing her care.

## 2021-06-12 NOTE — PROGRESS NOTES
Clinic Note    Assessment:     Assessment and Plan:  1. Eustachian tube dysfunction  Not sure why this is going on for this length of time and why it occurred the way that she describes it.  I think she should see ENT especially since she is can be flying in 2 weeks.  Continue with the steroid nasal spray and add Afrin for a few days.  Make sure she flies with the Afrin.  - Ambulatory referral to ENT       Patient Instructions   Continue flonase nasal spray once per day    Add afrin nasal spray twice per day for two days, then once per day  Also use afrin about one hour before a flight and bring with you on the plane (get a small enough one to be legal related to TSA rules)     See ENT soon for recommendations     No Follow-up on file.         Subjective:      Natalie Walters is a 72 y.o. female complains of some popping and crackling and difficulty clearing her ears still.  She does states that she washed her head in a sink and had her head tipped in a funny fashion and since then has felt like there is been fluid that has been present.  She saw 1 of our partners a week ago who suggested she had some eustachian tube dysfunction although I do not think it called at that and no evidence of infection.  Suggested steroid nasal spray twice per day for a week and then back to once per day.  Has no pain.  She is worried because she has to fly in 2 weeks.  She has had some chronic sinus difficulties and problems with clear ears in the past.    The following portions of the patient's history were reviewed and updated as appropriate: Past medical history, allergies, medicines    Review of Systems:    As above.  History   Smoking Status     Never Smoker   Smokeless Tobacco     Not on file         Objective:     Vitals:    08/25/17 1423   BP: 115/72   Patient Site: Right Arm   Patient Position: Sitting   Cuff Size: Adult Regular   Pulse: 72   Resp: 16       Exam:  Patient looks well no acute distress vital signs stable    Left  TM has this yellowish color behind the TM which have not seen before is interesting in the right side looked more normal no pressure no erythema minimal fluid.    Patient Active Problem List   Diagnosis     Serum Total Cholesterol Was Elevated     Dysthymic Disorder     Esophageal Reflux     Hypertension     Moderate persistent asthma     Osteopenia with high risk of fracture     Current Outpatient Prescriptions   Medication Sig Dispense Refill     aspirin 81 MG EC tablet Take 81 mg by mouth daily.       atorvastatin (LIPITOR) 40 MG tablet Take 1 tablet (40 mg total) by mouth daily. 90 tablet 3     budesonide (PULMICORT) 0.5 mg/2 mL nebulizer solution Take 2 mL (0.5 mg total) by nebulization 2 (two) times a day. DX: J45.40 120 mL 3     calcium-vitamin D (CALCIUM-VITAMIN D) 500 mg(1,250mg) -200 unit per tablet Take 1 tablet by mouth daily.       cholecalciferol, vitamin D3, 1,000 unit tablet Take 1,000 Units by mouth daily.       escitalopram oxalate (LEXAPRO) 20 MG tablet TAKE 1 TABLET (20 MG TOTAL) BY MOUTH DAILY. 90 tablet 2     ferrous sulfate 65 mg elemental iron Take 1 tablet by mouth 2 (two) times a week.        fluticasone (FLONASE) 50 mcg/actuation nasal spray 1 spray into each nostril daily.       irbesartan (AVAPRO) 150 MG tablet Take 1 tablet (150 mg total) by mouth daily. 90 tablet 3     omega-3 fatty acids (FISH OIL CONCENTRATE) 1,000 mg cap Take 1 tablet by mouth daily.       omeprazole (PRILOSEC) 40 MG capsule TAKE 1 CAPSULE (40 MG TOTAL) BY MOUTH DAILY BEFORE BREAKFAST. 90 capsule 0     SYMBICORT 80-4.5 mcg/actuation inhaler Inhale 2 puffs 2 (two) times a day.  11     therapeutic multivitamin (THERAGRAN) tablet Take 1 tablet by mouth daily.       tolterodine (DETROL LA) 4 MG 24 hr capsule Take 4 mg by mouth as needed.        No current facility-administered medications for this visit.          Ger Jerry    8/25/2017

## 2021-06-12 NOTE — TELEPHONE ENCOUNTER
Left message to call back for: patient   Information to relay to patient:  What is the patient needing to see Dr. Gomez for? Her schedule is full this week, she may need to be seen by another provider.     MALAIKA Brian

## 2021-06-12 NOTE — PROGRESS NOTES
Assessment:   1. Ear pressure, left  Examination was unremarkable but positive for mild effusion in the inner ear.  I did recommend to patient to increase the dose of her nasal steroid from once daily to twice daily for the next 1 week and if symptoms persist she should report to the clinic for possible referral to ENT.     Plan:   Treatment: none.  OTC analgesia as needed.  Water exclusion from affected ear until symptoms resolve.  Follow up in 7 days if symptoms not improving.     Subjective:   Natalie Walters is a 72 y.o. female who presents for evaluation of left pressure. Symptoms have been present for 6 days. She also notes decreased hearing in the left ear. She does not have a history of ear infections. She does not have a history of recent swimming.  Patient reported that she usually washes her hair during shower but she decided to wash in the sink last week and since then she has been having some difficulty hearing and pressure on her left ear.     The following portions of the patient's history were reviewed and updated as appropriate:   She  has a past medical history of Asthma; Chronic cough; Essential tremor; and Pneumonia (08/10/2014).  She  does not have any pertinent problems on file.  She  has a past surgical history that includes revision of cervix,non obstetrical; remove tonsils/adenoids,<13 y/o; nasal/sinus endoscopy,open maxill sinus; Parathyroid gland surgery; and Sinus surgery (05/2015).  Her family history includes Arthritis in her brother; Breast cancer in her maternal grandmother; Heart attack in her paternal grandfather; Pancreatic cancer in her mother. There is no history of Colon cancer.  She  reports that she has never smoked. She does not have any smokeless tobacco history on file. She reports that she does not drink alcohol. Her drug history is not on file.  Current Outpatient Prescriptions   Medication Sig Dispense Refill     ALPRAZolam (XANAX) 0.5 MG tablet Take 1 tablet (0.5 mg  total) by mouth 3 (three) times a day as needed for sleep or anxiety. 15 tablet 0     aspirin 81 MG EC tablet Take 81 mg by mouth daily.       atorvastatin (LIPITOR) 40 MG tablet Take 1 tablet (40 mg total) by mouth daily. 90 tablet 3     budesonide (PULMICORT) 0.5 mg/2 mL nebulizer solution Take 2 mL (0.5 mg total) by nebulization 2 (two) times a day. DX: J45.40 120 mL 3     calcium-vitamin D (CALCIUM-VITAMIN D) 500 mg(1,250mg) -200 unit per tablet Take 1 tablet by mouth daily.       cholecalciferol, vitamin D3, 1,000 unit tablet Take 1,000 Units by mouth daily.       escitalopram oxalate (LEXAPRO) 20 MG tablet TAKE 1 TABLET (20 MG TOTAL) BY MOUTH DAILY. 90 tablet 2     ferrous sulfate 65 mg elemental iron Take 1 tablet by mouth 2 (two) times a week.        fluticasone (FLONASE) 50 mcg/actuation nasal spray 1 spray into each nostril daily.       irbesartan (AVAPRO) 150 MG tablet Take 1 tablet (150 mg total) by mouth daily. 90 tablet 3     omeprazole (PRILOSEC) 40 MG capsule TAKE 1 CAPSULE (40 MG TOTAL) BY MOUTH DAILY BEFORE BREAKFAST. 90 capsule 0     SYMBICORT 80-4.5 mcg/actuation inhaler Inhale 2 puffs 2 (two) times a day.  11     therapeutic multivitamin (THERAGRAN) tablet Take 1 tablet by mouth daily.       tolterodine (DETROL LA) 4 MG 24 hr capsule Take 4 mg by mouth as needed.        No current facility-administered medications for this visit.      Current Outpatient Prescriptions on File Prior to Visit   Medication Sig     ALPRAZolam (XANAX) 0.5 MG tablet Take 1 tablet (0.5 mg total) by mouth 3 (three) times a day as needed for sleep or anxiety.     aspirin 81 MG EC tablet Take 81 mg by mouth daily.     atorvastatin (LIPITOR) 40 MG tablet Take 1 tablet (40 mg total) by mouth daily.     budesonide (PULMICORT) 0.5 mg/2 mL nebulizer solution Take 2 mL (0.5 mg total) by nebulization 2 (two) times a day. DX: J45.40     calcium-vitamin D (CALCIUM-VITAMIN D) 500 mg(1,250mg) -200 unit per tablet Take 1 tablet by  "mouth daily.     cholecalciferol, vitamin D3, 1,000 unit tablet Take 1,000 Units by mouth daily.     escitalopram oxalate (LEXAPRO) 20 MG tablet TAKE 1 TABLET (20 MG TOTAL) BY MOUTH DAILY.     ferrous sulfate 65 mg elemental iron Take 1 tablet by mouth 2 (two) times a week.      irbesartan (AVAPRO) 150 MG tablet Take 1 tablet (150 mg total) by mouth daily.     omeprazole (PRILOSEC) 40 MG capsule TAKE 1 CAPSULE (40 MG TOTAL) BY MOUTH DAILY BEFORE BREAKFAST.     SYMBICORT 80-4.5 mcg/actuation inhaler Inhale 2 puffs 2 (two) times a day.     therapeutic multivitamin (THERAGRAN) tablet Take 1 tablet by mouth daily.     tolterodine (DETROL LA) 4 MG 24 hr capsule Take 4 mg by mouth as needed.      No current facility-administered medications on file prior to visit.      She is allergic to cat dander; iodine; and other drug allergy (see comments)..    Review of Systems  A 12 point comprehensive review of systems was negative except as noted.     Objective:   /66  Pulse 72  Ht 5' 1.25\" (1.556 m)  Wt 138 lb 6.4 oz (62.8 kg)  SpO2 96%  BMI 25.94 kg/m2  General:  alert, appears stated age and cooperative   Right Ear: normal appearance   Left Ear: normal canals bilaterally   Mouth:  lips, mucosa, and tongue normal; teeth and gums normal   Neck: no adenopathy, no carotid bruit, no JVD, supple, symmetrical, trachea midline and thyroid not enlarged, symmetric, no tenderness/mass/nodules         "

## 2021-06-12 NOTE — PROGRESS NOTES
[facility] Daily Progress     Patient Name: Natalie Walters  Date: 2017  Visit #: 4  PTA visit #:  1  Referral Diagnosis: Acute pain of left shoulder  Referring provider: Jocelin Kerr MD  Visit Diagnosis:     ICD-10-CM    1. Acute pain of left shoulder M25.512    2. Shoulder impingement, left M75.42    3. Shoulder weakness M62.81          Assessment:     Patient is benefitting from skilled physical therapy and is making steady progress toward functional goals.  Patient is appropriate to continue with skilled physical therapy intervention, as indicated by initial plan of care.    Goal Status:Ongoing  Pt. will demonstrate/verbalize independence in self-management of condition in : 6 weeks  Pt. will improve posture : and demonstrate posture with minimal to no cuing;and maintain posture for;5 minutes;in 6 weeks  Patient will decrease : SPADI score;for improved quality of function;in 6 weeks  Pt will: be able to perfrom reaching tasks with pan <2/10; in 6 weeks     Plan / Patient Education:     Continue with initial plan of care.  Progress with home program as tolerated.   Go over HEP. Continue with MT.    Subjective:     Pain Ratin-2    Pt reports that she has had a stressful and busy week with her .  Pt reports that despite this her shoulder pain is the same to even slightly better.        Pt reports that she has been doing her exercises most days.  .        Objective:     Pt tolerates the exercises well with moderate cueing for technique.     Left shoulder AROM:  Flexion: 165 degrees  AB: 170 degrees  ER: 72 degrees, min pain  IR: T4 mild pain    Pt with decreased left shoulder strength and stability.  However, this is improving and continues to be addressed by her HEP.      Pt with increased tightness over the left UT and pectorals = moderate   Pt with increasing left scapular mobility with minimal deficits now   Pt with minimal left SC joint inferior mobility     Current  "Exercises:  Exercise #1: UBE  Comment #1: 3'  Exercise #2: Supine shoulder A-Z   Comment #2: Lx1   Exercise #3: S/L shoulder ER  Comment #3: Lx15  Exercise #4: Scap retract   Comment #4: 8  Exercise #5: shoulder bands - flex, ext   Comment #5: orange Bx12 - cues for correct reps and increasing slowly  Exercise #6: supine serratus punches  Comment #6: x10 R  Exercise #7: supine towel roll stretch  Comment #7: 60\" - modified position       Treatment Today    TREATMENT MINUTES COMMENTS   Evaluation     Self-care/ Home management     Manual therapy 14 Left shoulder GH distraction and post mobs - grades 3/4, Left SC joint inferior mobs - grades 1/2  STM to left UT and general shoulder in supine   Left scap distraction and mobilization and STM to left post shoulder in R S/L    Neuromuscular Re-education     Therapeutic Activity     Therapeutic Exercises 14 See flow sheet    Gait training     Modality__________________                Total 28    Blank areas are intentional and mean the treatment did not include these items.       Kayla Chappell, PT  7/31/2017  "

## 2021-06-13 NOTE — TELEPHONE ENCOUNTER
Refill Request  Did you contact pharmacy: Yes  Medication name:   Requested Prescriptions     Pending Prescriptions Disp Refills     budesonide-formoteroL (SYMBICORT) 80-4.5 mcg/actuation inhaler 10.2 Inhaler 0     Sig: INHALE 2 PUFFS BY MOUTH TWICE DAILY. Please schedule annual wellness visit/med check before further refills if agreeable, or consideration for virtual visit/med check.     fluticasone propionate (FLONASE) 50 mcg/actuation nasal spray 48 g 3     omeprazole (PRILOSEC) 40 MG capsule 90 capsule 3     Sig: Take 1 capsule (40 mg total) by mouth daily.     Who prescribed the medication: Kayla Gomez MD   Requested Pharmacy: [facility]  Is patient out of medication: No.  at least 3 days days left  Patient notified refills processed in 3 business days:  yes  Okay to leave a detailed message: yes      Patient wrote down the wrong date for her appointment with Dr. Gomez.  Patient is moving to [place] permanently to be with her family.  She is leaving 11/23/20.  She is asking if Dr. Gomez would refill these medications with 90 day supplies and alcides additional refills.  She will establish care with a new provider once she is settled into her new home.     Also, if Dr. Gomez could do a telephone conference with her yet this week, she will make herself available.

## 2021-06-13 NOTE — PROGRESS NOTES
Attempt 1-Care Guide called patient.  If this patient is returning our call please transfer to [facility] at ext. [phone].    Currently visiting grandchildren in [place]

## 2021-06-13 NOTE — PROGRESS NOTES
Attempt 2: Care Guide called patient.  If this patient is returning my call, please transfer to [facility] at ext [phone].

## 2021-06-13 NOTE — PROGRESS NOTES
Clinic Note    Assessment:     Assessment and Plan:    1. Cough    Given the patient's pulmonary history and her frequent issues with pneumonia, I will send her with a azithromycin today. She is also having some sinus tenderness that has persisted for the past week and so this should be addressed by the azithromycin as well. She will be reassessed in one week to make sure that her symptoms are improved. If they are not, we could consider a chest x-ray at this time.      Patient Instructions   Z-pack: Two pills on day one, followed by one pill every day until they are gone.     Make an appointment to see me in one week for follow-up.              Subjective:      Natalie Walters is a 72 y.o. female who is here for a cough.     Pt says that she has been having a productive cough for over one week. She has been coughing up dark green sputum. She has had pneumonia five times, the last time being three years ago.     She has not had any fevers. Having a little chest pain while at rest. Having a little more fatigue. Says that she has been dealing with a lot due to her 's heart failure. She has tried taking some ibuprofen for her symptoms.     She admits to not feeling terribly sick, but says that she is trying to be proactive and avoid pneumonia as she has been feeling progressively worse over the       The following portions of the patient's history were reviewed and updated as appropriate: Problem List, Allergies, Medications.     Review of Systems:    Review is negative except for what is mentioned above.     Social Hx:    History   Smoking Status     Never Smoker   Smokeless Tobacco     Not on file         Objective:     Vitals:    09/26/17 1429   BP: 128/62   Pulse: 81   Temp: 98  F (36.7  C)   Weight: 140 lb 3.2 oz (63.6 kg)       Exam:    General: No apparent distress. Calm. Alert and Oriented X3. Pt behavior is appropriate.  Head:Atraumatic. Normocephalic, right maxillary sinus pain with palpation.   Neck:  Supple. No JVD. Full ROM. Mild cervical adenopathy.   Eyes: PERRL, No discharge. No strabismus. No nystagmus.  Ears: TMs pearly gray with landmarks visible.   Nose/Mouth/Throat: Patent nares, no oral lesions, pharynx clear and without exudate. Uvula mid-line. Nasal septum mid-line. Clear turbinates.   Chest/Lungs: Normal chest wall, clear to auscultation, normal respiratory effort and rate.   Heart/Pulses: Regular rate and rhythm, strong and equal radial pulses, no murmurs. Capillary refill <2 seconds. No edema.   Musculoskeletal: No CVA tenderness with palpation. Good ROM with extremities.   Neurologic: Interactive, alert, no focal findings, CNs intact.   Skin: Warm, dry. Normal hair pattern. Free of lesions. Normal skin turgor.       Patient Active Problem List   Diagnosis     Serum Total Cholesterol Was Elevated     Dysthymic Disorder     Esophageal Reflux     Hypertension     Moderate persistent asthma     Osteopenia with high risk of fracture     Current Outpatient Prescriptions   Medication Sig Dispense Refill     ALPRAZolam (XANAX) 0.5 MG tablet TAKE 1 TABLET (0.5 MG TOTAL) BY MOUTH 3 (THREE) TIMES A DAY AS NEEDED FOR SLEEP OR ANXIETY. 30 tablet 0     aspirin 81 MG EC tablet Take 81 mg by mouth daily.       atorvastatin (LIPITOR) 40 MG tablet TAKE 1 TABLET (40 MG TOTAL) BY MOUTH DAILY. 90 tablet 3     calcium-vitamin D (CALCIUM-VITAMIN D) 500 mg(1,250mg) -200 unit per tablet Take 1 tablet by mouth daily.       cholecalciferol, vitamin D3, 1,000 unit tablet Take 1,000 Units by mouth daily.       escitalopram oxalate (LEXAPRO) 20 MG tablet TAKE 1 TABLET (20 MG TOTAL) BY MOUTH DAILY. 90 tablet 2     ferrous sulfate 65 mg elemental iron Take 1 tablet by mouth 2 (two) times a week.        fluticasone (FLONASE) 50 mcg/actuation nasal spray 1 spray into each nostril daily.       irbesartan (AVAPRO) 150 MG tablet TAKE 1 TABLET (150 MG TOTAL) BY MOUTH DAILY. 90 tablet 3     omega-3 fatty acids (FISH OIL CONCENTRATE)  1,000 mg cap Take 1 tablet by mouth daily.       omeprazole (PRILOSEC) 40 MG capsule Take 1 capsule (40 mg total) by mouth daily before breakfast. 90 capsule 0     SYMBICORT 80-4.5 mcg/actuation inhaler Inhale 2 puffs 2 (two) times a day.  11     therapeutic multivitamin (THERAGRAN) tablet Take 1 tablet by mouth daily.       tolterodine (DETROL LA) 4 MG 24 hr capsule Take 4 mg by mouth as needed.        No current facility-administered medications for this visit.          Sunil Olvera (Rob), NORMAN    9/26/2017

## 2021-06-13 NOTE — TELEPHONE ENCOUNTER
Agree with 3 month fill. Wishing her the best. I can do a video visit with her tomorrow Thursday at 11:40am if she is available.   Thanks,   Dr. Gomez

## 2021-06-14 NOTE — PROGRESS NOTES
Spoke with  patient was not available but most of her CCC goals revolve around her having help with her spouse.

## 2021-06-14 NOTE — PROGRESS NOTES
[facility] Discharge Summary    Patient Name: Natalie Walters  Date: 12/12/2017  Referring provider: Jocelin Kerr MD  Visit Diagnosis:   1. Acute pain of left shoulder     2. Shoulder impingement, left     3. Shoulder weakness     4. Essential hypertension     5. Moderate persistent asthma     6. Osteopenia with high risk of fracture     7. Pure hypercholesterolemia         Goal Status:Ongoing  Pt. will demonstrate/verbalize independence in self-management of condition in : 6 weeks;Met  Pt. will improve posture : and demonstrate posture with minimal to no cuing;and maintain posture for;5 minutes;in 6 weeks;Met  Patient will decrease : SPADI score;for improved quality of function;in 6 weeks;Comment  Comment: Not Assessed   Pt will: be able to perfrom reaching tasks with pan <2/10; in 6 weeks; Progressing toward     Patient was seen for 8 visits with the last visit on 8/28/17.    Goals were partially met, but progress was limited based on sporadic PT visits and continued irritating factors at home.      Therapy will be discontinued at this time.  The patient will need a new referral to resume.    Thank you for your referral.  Kayla Chappell  12/12/2017  12:44 PM

## 2021-06-15 NOTE — PROGRESS NOTES
Care Guide called patient.  If this patient is returning our call please transfer to Steph[facility] at ext. [phone].   I have called (patient) 3 times over the past two weeks and have been unsuccessful in reaching them. This patient has also not returned any of my messages.     I will continue attempting to reach out to this patient in one month. I will also check this patient s chart for upcoming appointments, ER reports that may contain a new phone number, or any other recent activity.

## 2021-06-15 NOTE — PROGRESS NOTES
Preoperative Exam    Scheduled Procedure: Total Rt Knee Replacement  Surgery Date:  3/1/18  Surgery Location: [facility], fax [phone]    Surgeon:  Dr. Lovell    Assessment/Plan:     1. Preop cardiovascular exam  I do not find any contraindication to her planned procedure.  Medications were discussed with her.  She was given a handout with directions.  - Electrocardiogram Perform and Read    2. Visit for screening mammogram  She plans to schedule this for sometime after her surgery.  - Mammo Screening Bilateral; Future    3. HTN (hypertension)  All controlled.  She will stop her ARB on the morning of surgery.  This can be resumed as her blood pressure allows postoperatively.  - Basic Metabolic Panel    4. Arthritis of right knee  She will have this treated surgically.    5. Moderate persistent asthma  Stable on current medication.  She will take her inhaler on the morning of surgery.    6. Esophageal reflux  Stable on PPI.  - HM2(CBC w/o Differential)        Surgical Procedure Risk: Intermediate (reported cardiac risk generally 1-5%)  Have you had prior anesthesia?: Yes  Have you or any family members had a previous anesthesia reaction:  No  Do you or any family members have a history of a clotting or bleeding disorder?: No  Cardiac Risk Assessment: no increased risk for major cardiac complications    Patient approved for surgery with general or local anesthesia.      Functional Status: Independent  Patient plans to recover at a TCU.  She is her 's care giver     Subjective:      Natalie Walters is a 72 y.o. female who presents for a preoperative consultation.      She will be having a right knee replaced on March 1.  Several years ago she had torn cartilage and had surgery there.  She was found to have arthritis underneath her kneecap.  She has advanced arthritis in both knees but currently her right knee is her most symptomatic.  It is been getting worse over the last 2-3 months.  She has been  treated with cortisone and Synvisc but only had minimal relief.  It catches and at times feels like she could fall.  She is scheduled for preop class as well as a joint replacement class.  She can exercise without chest pain or shortness of breath.    Her other past medical history is significant for hyperlipidemia stable on medication, reflux treated with Prilosec, well-controlled hypertension, well-controlled asthma and incontinence.  Her incontinence is controlled with Detrol.  She has depression.  That is managed well with Lexapro.  Her  has congestive heart failure and she is his primary caregiver.  That seems to be the main source of her symptoms.    Her son is flying in from [place] to be here with her when she has her surgery and to transfer her from the hospital to transitional care.    All other systems reviewed and are negative, other than those listed in the HPI.    Pertinent History  Do you have difficulty breathing or chest pain after walking up a flight of stairs: No  History of obstructive sleep apnea: No  Steroid use in the last 6 months: No  Frequent Aspirin/NSAID use: Yes: takes 81 mg aspirin daily and ibuprofen daily. Pt knows when to stop.  Prior Blood Transfusion: No  Prior Blood Transfusion Reaction: No  If for some reason prior to, during or after the procedure, if it is medically indicated, would you be willing to have a blood transfusion?:  There is no transfusion refusal.    Current Outpatient Prescriptions   Medication Sig Dispense Refill     ALPRAZolam (XANAX) 0.5 MG tablet TAKE 1 TABLET (0.5 MG TOTAL) BY MOUTH 3 (THREE) TIMES A DAY AS NEEDED FOR SLEEP OR ANXIETY. 30 tablet 0     aspirin 81 MG EC tablet Take 81 mg by mouth daily.       atorvastatin (LIPITOR) 40 MG tablet TAKE 1 TABLET (40 MG TOTAL) BY MOUTH DAILY. 90 tablet 3     calcium-vitamin D (CALCIUM-VITAMIN D) 500 mg(1,250mg) -200 unit per tablet Take 1 tablet by mouth daily.       cholecalciferol, vitamin D3, 1,000 unit  tablet Take 1,000 Units by mouth daily.       escitalopram oxalate (LEXAPRO) 20 MG tablet TAKE 1 TABLET (20 MG TOTAL) BY MOUTH DAILY. 90 tablet 2     ferrous sulfate 65 mg elemental iron Take 1 tablet by mouth 2 (two) times a week.        fluticasone (FLONASE) 50 mcg/actuation nasal spray 1 spray into each nostril daily.       irbesartan (AVAPRO) 150 MG tablet TAKE 1 TABLET (150 MG TOTAL) BY MOUTH DAILY. 90 tablet 3     omega-3 fatty acids (FISH OIL CONCENTRATE) 1,000 mg cap Take 1 tablet by mouth daily.       omeprazole (PRILOSEC) 40 MG capsule TAKE 1 CAPSULE (40 MG TOTAL) BY MOUTH DAILY BEFORE BREAKFAST. 90 capsule 2     SYMBICORT 80-4.5 mcg/actuation inhaler Inhale 1 puff 2 (two) times a day.   11     therapeutic multivitamin (THERAGRAN) tablet Take 1 tablet by mouth daily.       tolterodine (DETROL LA) 4 MG 24 hr capsule Take 4 mg by mouth as needed.        oseltamivir (TAMIFLU) 75 MG capsule Take 1 capsule (75 mg total) by mouth daily for 10 days. 10 capsule 0     No current facility-administered medications for this visit.         Allergies   Allergen Reactions     Cat Dander Itching     Iodine      Pt. Reports rash with topical iodine.     Other Drug Allergy (See Comments) Unknown and Itching     Trees, grasses, ragweed.       Patient Active Problem List   Diagnosis     Serum Total Cholesterol Was Elevated     Dysthymic Disorder     Esophageal Reflux     Hypertension     Moderate persistent asthma     Osteopenia with high risk of fracture       Past Medical History:   Diagnosis Date     Asthma      Chronic cough      Essential tremor      Pneumonia 08/10/2014    pt states this is 5th occurrence       Past Surgical History:   Procedure Laterality Date     PARATHYROID GLAND SURGERY      gland removed with benign tumor     WA NASAL/SINUS ENDOSCOPY,OPEN MAXILL SINUS      Description: Nasal Endoscopy With Maxillary Antrostomy;  Recorded: 08/20/2013;     WA REMOVE TONSILS/ADENOIDS,<11 Y/O      Description:  "Tonsillectomy With Adenoidectomy;  Recorded: 08/20/2013;     IA REVISION OF CERVIX,NON OBSTETRICAL      Description: Cervical Cerclage (Non-OB);  Recorded: 08/20/2013;     SINUS SURGERY  05/2015    [facility]; 3 hr sugery, bones removed       Social History     Social History     Marital status:      Spouse name: N/A     Number of children: N/A     Years of education: N/A     Occupational History     Not on file.     Social History Main Topics     Smoking status: Never Smoker     Smokeless tobacco: Never Used     Alcohol use No     Drug use: Not on file     Sexual activity: Not on file     Other Topics Concern     Not on file     Social History Narrative       Patient Care Team:  Jocelin Kerr MD as PCP - General  Stephie Thompson CMA as Clinic Care Coordination Care Guide (Clinic Care Coordination)          Objective:     Vitals:    02/07/18 1015   BP: 124/68   Pulse: 71   SpO2: 98%   Weight: 139 lb 3 oz (63.1 kg)   Height: 5' 1.5\" (1.562 m)       Physical Exam:  General Appearance: Alert, cooperative, no distress, appears stated age   Head: Normocephalic, without obvious abnormality, atraumatic   Eyes: PERRL, conjunctiva/corneas clear, EOM's intact   Throat: Lips, mucosa, and tongue normal; teeth and gums normal   Neck: Normal ROM   Lungs: Clear to auscultation bilaterally, respirations unlabored.   Heart: Regular rate and rhythm, S1 and S2 normal, no murmur, rub, or gallop,   Abdomen: Soft, non-tender, bowel sounds active, no masses, no organomegaly   Extremities: Extremities normal, atraumatic, no cyanosis or edema   Skin: Skin color, texture, turgor normal, no rashes or lesions   Neurologic: Normal       Patient Instructions   Please set up your mammogram:  [phone]    Hold all supplements, aspirin and NSAIDs for 7 days prior to surgery.    Follow your surgeon's direction on when to stop eating and drinking prior to surgery.    Remove all jewelry and metal piercings before your surgery.     Remove " nail polish from fingers before surgery.    No irbesartan on the morning of surgery.  Hold lipitor the night before your surgery.  It's ok to take your lexapro and omeprazole the day of.  Take your inhaler as usual.    Today's labs will be available on [employer] [facility].  If you aren't signed up, then we'll mail them out.  If anything needs more immediate follow up, we'll also call.    Take care!      EKG:  NSR, rate 70    Labs:  Recent Results (from the past 24 hour(s))   Electrocardiogram Perform and Read    Collection Time: 02/07/18 10:26 AM   Result Value Ref Range    SYSTOLIC BLOOD PRESSURE  mmHg    DIASTOLIC BLOOD PRESSURE  mmHg    VENTRICULAR RATE 70 BPM    ATRIAL RATE 70 BPM    P-R INTERVAL 124 ms    QRS DURATION 70 ms    Q-T INTERVAL 388 ms    QTC CALCULATION (BEZET) 419 ms    P Axis 61 degrees    R AXIS 82 degrees    T AXIS 23 degrees    MUSE DIAGNOSIS       Normal sinus rhythm  Normal ECG  No previous ECGs available  Confirmed by MARCIN CHRISTENSEN MD LOC:JN ([id number]) on 2/7/2018 2:04:21 PM     HM2(CBC w/o Differential)    Collection Time: 02/07/18 11:40 AM   Result Value Ref Range    WBC 8.9 4.0 - 11.0 thou/uL    RBC 4.29 3.80 - 5.40 mill/uL    Hemoglobin 13.1 12.0 - 16.0 g/dL    Hematocrit 39.8 35.0 - 47.0 %    MCV 93 80 - 100 fL    MCH 30.5 27.0 - 34.0 pg    MCHC 32.8 32.0 - 36.0 g/dL    RDW 11.6 11.0 - 14.5 %    Platelets 320 140 - 440 thou/uL    MPV 7.3 7.0 - 10.0 fL   Basic Metabolic Panel    Collection Time: 02/07/18 11:40 AM   Result Value Ref Range    Sodium 140 136 - 145 mmol/L    Potassium 4.0 3.5 - 5.0 mmol/L    Chloride 105 98 - 107 mmol/L    CO2 27 22 - 31 mmol/L    Anion Gap, Calculation 8 5 - 18 mmol/L    Glucose 87 70 - 125 mg/dL    Calcium 9.8 8.5 - 10.5 mg/dL    BUN 17 8 - 28 mg/dL    Creatinine 0.75 0.60 - 1.10 mg/dL    GFR MDRD Af Amer >60 >60 mL/min/1.73m2    GFR MDRD Non Af Amer >60 >60 mL/min/1.73m2       Immunization History   Administered Date(s) Administered     Influenza high dose,  seasonal 10/02/2015, 11/16/2017     Influenza, Seasonal, Inj PF IIV3 10/25/2011     Influenza, inj, historic,unspecified 12/04/2006, 11/10/2008, 10/05/2009, 11/01/2010, 10/25/2011, 10/01/2012, 11/07/2012     Influenza, seasonal,quad inj 6-35 mos 11/04/2014     Influenza,seasonal quad, PF 10/30/2013     Influenza,seasonal quad, PF, 36+MOS 10/30/2013     Influenza,seasonal, Inj IIV3 12/04/2003, 10/18/2004, 12/04/2006, 11/10/2008, 10/05/2009, 11/01/2010, 10/25/2011, 11/07/2012, 11/04/2014     Pneumo Conj 13-V (2010&after) 07/21/2016     Pneumo Polysac 23-V 01/01/2009, 09/09/2009     Td,adult,historic,unspecified 05/24/2006     Tdap 05/24/2006, 05/20/2014     ZOSTER 09/09/2009           Electronically signed by Jocelin Kerr MD 02/07/18 10:17 AM ad

## 2021-06-15 NOTE — PROGRESS NOTES
Attempt 2: Care Guide called patient.  If this patient is returning my call, please transfer to [facility] at ext [phone].

## 2021-06-16 NOTE — ANESTHESIA PROCEDURE NOTES
Peripheral Block    Patient location during procedure: pre-op  Start time: 3/1/2018 6:45 AM  End time: 3/1/2018 6:47 AM  post-op analgesia per surgeon order as noted in medical record  Staffing:  Performing  Anesthesiologist: SARAH BENNETT  Preanesthetic Checklist  Completed: patient identified, site marked, risks, benefits, and alternatives discussed, timeout performed, consent obtained, at patient's request, airway assessed, oxygen available, suction available, emergency drugs available and hand hygiene performed  Peripheral Block  Block type: other (selective tibial), tibial  Prep: ChloraPrep  Patient position: left lateral decubitus  Patient monitoring: cardiac monitor, continuous pulse oximetry, heart rate and blood pressure  Laterality: right  Injection technique: ultrasound guided    Ultrasound used to visualize needle placement in proximity to nerve being blocked: yes   Permanent ultrasound image captured for medical record  Sterile gel and probe cover used for ultrasound.    Needle  Needle type: Stimuplex   Needle gauge: 20G  Needle length: 4 in  no peripheral nerve catheter placed  Assessment  Injection assessment: no difficulty with injection, negative aspiration for heme, no paresthesia on injection and incremental injection  Additional Notes  No issues.  Block working well.  No signs of LAST.

## 2021-06-16 NOTE — ANESTHESIA CARE TRANSFER NOTE
Last vitals:   Vitals:    03/01/18 0908   BP: 131/86   Pulse: 92   Resp: 16   Temp: 36.8  C (98.2  F)   SpO2: 96%     Patient's level of consciousness is awake  Spontaneous respirations: yes  Maintains airway independently: yes  Dentition unchanged: yes  Oropharynx: oropharynx clear of all foreign objects    QCDR Measures:  ASA# 20 - Surgical Safety Checklist: WHO surgical safety checklist completed prior to induction  PQRS# 430 - Adult PONV Prevention: 4558F - Pt received => 2 anti-emetic agents (different classes) preop & intraop  ASA# 8 - Peds PONV Prevention: NA - Not pediatric patient, not GA or 2 or more risk factors NOT present  PQRS# 424 - Sarah-op Temp Management: 4559F - At least one body temp DOCUMENTED => 35.5C or 95.9F within required timeframe  PQRS# 426 - PACU Transfer Protocol: - Transfer of care checklist used  ASA# 14 - Acute Post-op Pain: ASA14B - Patient did NOT experience pain >= 7 out of 10

## 2021-06-16 NOTE — PROGRESS NOTES
Clinic Note    Assessment:     Assessment and Plan:    1. Sinusitis  Patient has a history of sinusitis and has been treated at the [facility] in the past for which she has had surgeries to correct her sinus anatomy.  She can tell when a sinus infection is mounting and feels as though she is in the midst of infection at this point.  Says that Augmentin has worked well for her in the past.  She is having a total knee replacement on her right knee in 2 weeks and wants to make sure that she is healthy for her procedure.  We will send her with Augmentin today and have her follow-up in 1 week if she is not feeling better.     Patient Instructions   To Schedule your Mammogram please call [facility] at [phone].     Take the Augmentin every twelve hours for seven days. If you do not start to feel better after one week, come back to see me.              Subjective:      Natalie Walters is a 72 y.o. female comes to the clinic today for a sinus infection.  Patient says that her symptoms started about 5 days ago, she has been having pain in her right maxillary sinus as well as some pain in her bilateral frontal sinus areas.  Patient has had some postnasal drip and a light cough.  No fevers.  No muscle aches or chills.  No nausea or vomiting.  Patient has not had any difficulty with swallowing or eating.  Patient has extensive history of sinus infections and has been treated at the [facility] in the past.  Patient has history of bronchiectasis and wears a vest for chest wall oscillation.  She denies shortness of breath, dyspnea on exertion, or chest pain.    The following portions of the patient's history were reviewed and updated as appropriate: Allergies, medications, problem list.    Review of Systems:    Review is negative except for what is mentioned above.     Social Hx:    History   Smoking Status     Never Smoker   Smokeless Tobacco     Never Used         Objective:     Vitals:    02/13/18 1034   BP:  106/70   Pulse: 77   Temp: 98.7  F (37.1  C)   SpO2: 98%   Weight: 138 lb 11.2 oz (62.9 kg)       Exam:    General: No apparent distress. Calm. Alert and Oriented X3. Pt behavior is appropriate.  Head:Atraumatic. Normocephalic, right maxillary sinus pain with palpation  Neck: Supple. No JVD. Full ROM.  Mild cervical adenopathy on the left side  Eyes: PERRL, No discharge. No strabismus. No nystagmus.  Ears: TMs difficult to visualize due to cerumen and canal anatomy.  Nose/Mouth/Throat: Patent nares, no oral lesions, pharynx clear and without exudate.  Postnasal drip present uvula mid-line. Nasal septum mid-line. Clear turbinates.   Lymph: No axillar adenopathy.   Chest/Lungs: Normal chest wall, clear to auscultation, normal respiratory effort and rate.   Heart/Pulses: Regular rate and rhythm, strong and equal radial pulses, no murmurs. Capillary refill <2 seconds. No edema.   Musculoskeletal: No CVA tenderness with palpation. Good ROM with extremities.   Neurologic: Interactive, alert, no focal findings, CNs intact.   Skin: Warm, dry. Normal hair pattern. Free of lesions. Normal skin turgor.       Patient Active Problem List   Diagnosis     Serum Total Cholesterol Was Elevated     Dysthymic Disorder     Esophageal Reflux     Hypertension     Moderate persistent asthma     Osteopenia with high risk of fracture     Current Outpatient Prescriptions   Medication Sig Dispense Refill     aspirin 81 MG EC tablet Take 81 mg by mouth daily.       atorvastatin (LIPITOR) 40 MG tablet TAKE 1 TABLET (40 MG TOTAL) BY MOUTH DAILY. 90 tablet 3     calcium-vitamin D (CALCIUM-VITAMIN D) 500 mg(1,250mg) -200 unit per tablet Take 1 tablet by mouth daily.       cholecalciferol, vitamin D3, 1,000 unit tablet Take 1,000 Units by mouth daily.       escitalopram oxalate (LEXAPRO) 20 MG tablet TAKE 1 TABLET (20 MG TOTAL) BY MOUTH DAILY. 90 tablet 2     ferrous sulfate 65 mg elemental iron Take 1 tablet by mouth 2 (two) times a week.         fluticasone (FLONASE) 50 mcg/actuation nasal spray 1 spray into each nostril daily.       irbesartan (AVAPRO) 150 MG tablet TAKE 1 TABLET (150 MG TOTAL) BY MOUTH DAILY. 90 tablet 3     omega-3 fatty acids (FISH OIL CONCENTRATE) 1,000 mg cap Take 1 tablet by mouth daily.       omeprazole (PRILOSEC) 40 MG capsule TAKE 1 CAPSULE (40 MG TOTAL) BY MOUTH DAILY BEFORE BREAKFAST. 90 capsule 2     oseltamivir (TAMIFLU) 75 MG capsule Take 1 capsule (75 mg total) by mouth daily for 10 days. 10 capsule 0     SYMBICORT 80-4.5 mcg/actuation inhaler Inhale 1 puff 2 (two) times a day.   11     therapeutic multivitamin (THERAGRAN) tablet Take 1 tablet by mouth daily.       tolterodine (DETROL LA) 4 MG 24 hr capsule Take 4 mg by mouth as needed.        ALPRAZolam (XANAX) 0.5 MG tablet TAKE 1 TABLET (0.5 MG TOTAL) BY MOUTH 3 (THREE) TIMES A DAY AS NEEDED FOR SLEEP OR ANXIETY. 30 tablet 0     amoxicillin-clavulanate (AUGMENTIN) 875-125 mg per tablet Take 1 tablet by mouth 2 (two) times a day for 7 days. 14 tablet 0     No current facility-administered medications for this visit.        Total time spent with patient was 20 minutes with >50% of time spent in face-to-face counseling regarding the above plan       Sunil Olvera (Rob), NORMAN    2/13/2018

## 2021-06-16 NOTE — ANESTHESIA PROCEDURE NOTES
Peripheral Block    Patient location during procedure: pre-op  Start time: 3/1/2018 6:47 AM  End time: 3/1/2018 6:49 AM  post-op analgesia per surgeon order as noted in medical record  Staffing:  Performing  Anesthesiologist: SARAH BENNETT  Preanesthetic Checklist  Completed: patient identified, site marked, risks, benefits, and alternatives discussed, timeout performed, consent obtained, at patient's request, airway assessed, oxygen available, suction available, emergency drugs available and hand hygiene performed  Peripheral Block  Block type: saphenous  Prep: ChloraPrep  Patient position: supine  Patient monitoring: cardiac monitor, continuous pulse oximetry, heart rate and blood pressure  Laterality: right  Injection technique: ultrasound guided    Ultrasound used to visualize needle placement in proximity to nerve being blocked: yes   Permanent ultrasound image captured for medical record  Sterile gel and probe cover used for ultrasound.    Needle  Needle type: Stimuplex   Needle gauge: 20G  Needle length: 4 in  no peripheral nerve catheter placed  Assessment  Injection assessment: no difficulty with injection, negative aspiration for heme, no paresthesia on injection and incremental injection  Additional Notes  No issues.  Vss.  No signs of LAST.

## 2021-06-16 NOTE — PROGRESS NOTES
[facility] Patient Outreach:     Discharge Date::  3/20/18      Reason for TCU stay (discharge diagnosis)::  Right TKA      Are you feeling better, the same or worse since your discharge?:  Patient is feeling the same          As part of your discharge plan, did they discuss home care with you?: No (doing outpatient at [facility])            Did you receive any new medications, or was there a change to your medications?: No            Do you have any follow up visits scheduled with your PCP or Specialist?:  No          I'm glad to hear you're doing well and we want you to continue to do well. Your PCP would like to see you for a follow-up visit. Can we help set that up for your today?: Yes            (RN) Patient transferred to [facility]? **If immediate concers (e.g. patient is feeling worse and/or not taking new medictations), send in basket message to PCP with quick summary of concern.: Yes

## 2021-06-16 NOTE — PROGRESS NOTES
[facility]      Code Status:  FULL CODE  Visit Type: Discharge Summary     Facility:  [facility] [[id number]]           History of Present Illness: Natalie Walters is a 72 y.o. female  who is currently readmitted to the TCU as a transfer from the hospital. Patient here with RT TKA which was done as an elective procedure.   Post operative course complicated by pain management. Pt is currently on Tylenol as needed along with oxycodone as needed and was reporting severe pain, though this has improved enough to return home. For DVT prophylaxis ortho has elected to put her on aspirin 325 twice daily for 30 days. For ambulation pt is using a walker as distances are limited. Patient was subsequently readmitted to the hospital with severe emesis, nausea and vomiting which was felt to be viral gastroenteritis possibly with narcotic induced.  Imaging also revealed that she had significant amount of constipation and she ordered miralax. She was subsequently discharged back to the TCU. Recheck labs came back essentially stable except for leukocytoses.  Subsequently patient was complaining of increasing warmth, pain and tenderness in her right knee with more swelling. She was urgently sent to orthopedics for evaluation. Fortunately for her evaluation essentially is negative and they stated that she is doing well with no new concerns for cellulitis or concerns about infection. She continues to have some pain in her knee joint but otherwise is stable. She will be discharged to home with outpatient services soon.    Past Medical History:   Diagnosis Date     Asthma      Chronic cough      Essential tremor      Hypertension      Pneumonia 08/10/2014    pt states this is 5th occurrence     Past Surgical History:   Procedure Laterality Date     PARATHYROID GLAND SURGERY      gland removed with benign tumor     MA NASAL/SINUS ENDOSCOPY,OPEN MAXILL SINUS      Description: Nasal Endoscopy With  Maxillary Antrostomy;  Recorded: 08/20/2013;     IN REMOVE TONSILS/ADENOIDS,<13 Y/O      Description: Tonsillectomy With Adenoidectomy;  Recorded: 08/20/2013;     IN REVISION OF CERVIX,NON OBSTETRICAL      Description: Cervical Cerclage (Non-OB);  Recorded: 08/20/2013;     REPLACEMENT TOTAL KNEE Right 3/1/2018    Procedure: RIGHT TOTAL KNEE ARTHROPLASTY COMPUTER ASSIST;  Surgeon: Sorin Lovell MD;  Location: [facility] Main OR;  Service:      SINUS SURGERY  05/2015    [place] [facility]; 3 hr sugery, bones removed     Family History   Problem Relation Age of Onset     Pancreatic cancer Mother      Arthritis Brother      Breast cancer Maternal Grandmother      Heart attack Paternal Grandfather      Colon cancer Neg Hx      Social History     Social History     Marital status:      Spouse name: N/A     Number of children: N/A     Years of education: N/A     Occupational History     Not on file.     Social History Main Topics     Smoking status: Never Smoker     Smokeless tobacco: Never Used     Alcohol use No     Drug use: No     Sexual activity: Not on file     Other Topics Concern     Not on file     Social History Narrative     Current Outpatient Prescriptions   Medication Sig Dispense Refill     acetaminophen (TYLENOL) 650 MG CR tablet Take 650 mg by mouth 4 (four) times a day. 8 am, noon, 4 pm, 8 pm       aspirin 325 MG tablet Take 1 tablet (325 mg total) by mouth 2 (two) times a day with meals. 60 tablet 0     atorvastatin (LIPITOR) 40 MG tablet Take 40 mg by mouth daily.       CALCIUM CARBONATE (CALCIUM 500 ORAL) Take 1 tablet by mouth daily.       cholecalciferol, vitamin D3, (CHOLECALCIFEROL) 1,000 unit tablet Take 1,000 Units by mouth daily.       docusate sodium (COLACE) 100 MG capsule Take 1 capsule (100 mg total) by mouth 2 (two) times a day as needed for constipation.  0     escitalopram oxalate (LEXAPRO) 20 MG tablet Take 20 mg by mouth daily.       ferrous sulfate 65 mg elemental iron Take 1 tablet by  mouth 2 (two) times a week. Tuesdays and Fridays       fluticasone (FLONASE) 50 mcg/actuation nasal spray 1 spray into each nostril daily.       hydrOXYzine pamoate (VISTARIL) 25 MG capsule Take 25 mg by mouth 3 (three) times a day. At 8am, noon, and 4pm       hydrOXYzine pamoate (VISTARIL) 25 MG capsule Take 50 mg by mouth at bedtime. At 7pm       irbesartan (AVAPRO) 150 MG tablet Take 150 mg by mouth daily.       omega-3 fatty acids (FISH OIL CONCENTRATE) 1,000 mg cap Take 1 tablet by mouth daily.       omeprazole (PRILOSEC) 40 MG capsule Take 40 mg by mouth daily before breakfast.       oxyCODONE (ROXICODONE) 5 MG immediate release tablet Take 1-2 tablets (5-10 mg total) by mouth every 4 (four) hours as needed for pain. Take 1 tab for pain 1-6/10, take 2 tabs for pain 7-10/10. 60 tablet 0     POLYETHYLENE GLYCOL 3350 (MIRALAX ORAL) Take by mouth daily. 1 tsp       SYMBICORT 80-4.5 mcg/actuation inhaler Inhale 1 puff 2 (two) times a day.   11     therapeutic multivitamin (THERAGRAN) tablet Take 1 tablet by mouth daily.       tolterodine (DETROL LA) 4 MG 24 hr capsule Take 4 mg by mouth daily as needed.        No current facility-administered medications for this visit.      Allergies   Allergen Reactions     Cat Dander Itching     Iodine      Pt. Reports rash with topical iodine.     Other Drug Allergy (See Comments) Unknown and Itching     Trees, grasses, ragweed.         Review of Systems:    Constitutional: Negative.  Negative for fever, chills, has activity change, appetite change and fatigue.   HENT: Negative for congestion and facial swelling.    Eyes: Negative for photophobia, redness and visual disturbance.   Respiratory: Negative for cough and chest tightness.    Cardiovascular: Negative for chest pain, palpitations and leg swelling.   Gastrointestinal: Negative for nausea, diarrhea, constipation, blood in stool and abdominal distention.   Genitourinary: Negative.    Musculoskeletal: Negative.   Complaining of pain in her knee, was seen by orthopedics. Ready to return home.  Skin: Negative.    Neurological: Negative for dizziness, tremors, syncope, weakness, light-headedness and headaches.   Hematological: Does not bruise/bleed easily.   Psychiatric/Behavioral: Negative.      Vitals:    03/19/18 1609   BP: 114/66   Pulse: 86   Resp: 16   Temp: 97.6  F (36.4  C)   SpO2: 95%       Physical Exam:    GENERAL: no acute distress. Cooperative in conversation.   HEENT: pupils are equal, round and reactive. Oral mucosa is moist and intact.  RESP:Chest symmetric. Regular respiratory rate. No stridor.  CVS: S1S2  ABD: Nondistended, soft.  EXTREMITIES: No lower extremity edema. Right TKA with an intact incision.  No evidence of surrounding skin warmth or cellulitis.  NEURO: non focal. Alert and oriented x3.   PSYCH: within normal limits. No depression or anxiety.  SKIN: warm dry intact      Labs:    Recent Results (from the past 120 hour(s))   Urinalysis    Collection Time: 03/17/18  4:58 PM   Result Value Ref Range    Color, UA Yellow Colorless, Yellow, Straw, Light Yellow    Clarity, UA Clear Clear    Glucose, UA Negative Negative    Bilirubin, UA Negative Negative    Ketones, UA Negative Negative    Specific Gravity, UA 1.018 1.001 - 1.030    Blood, UA Negative Negative    pH, UA 5.0 4.5 - 8.0    Protein, UA Negative Negative mg/dL    Urobilinogen, UA <2.0 E.U./dL <2.0 E.U./dL, 2.0 E.U./dL    Nitrite, UA Negative Negative    Leukocytes, UA Negative Negative   Culture, Urine    Collection Time: 03/17/18  4:58 PM   Result Value Ref Range    Culture No Growth      Assessment/Plan:      Constipation: on scheduled MiraLAX. Add senna to her regimen at her request.    Status post right TKA done as an elective procedure:She is weightbearing as tolerated continue with incisional cares and follow-up with orthopedics as scheduled. Sent back to orthopedics for urgent evaluation due to patient subjective concern of increased  warmth pain and tenderness and elevated white count. Ortho stable and patient feels that she is stable and have recommended her to continue with therapy and use pain pills appropriately.     Pain management: On Tylenol as well as oxycodone as needed. Vistaril 4 times a day is also scheduled for her. Continues to have significant pain.     DVT prophylaxis: she is on aspirin 325 twice daily for 30 days.  .  History of unwitnessed vaginal bleeding.  No repeat episode reported by patient.     History of chronic anxiety: she is on Lexapro 20 mg daily, been taken off Xanax at her request.     ABLA: continue with her oral iron supplementation.     Hypertension:  with hypotension in the hospital continue to monitor blood pressures she is running on the low side.    Hyperlipidemia: continue with the Lipitor.     Vitamin D deficiency: on supplementation, adequate level.    GERD: PPI.     History of moderate persistent asthma: with underlying history of bronchiectasis per patient she has a Vest that she uses. I have advised her to bring it and use it regularly as advised by her pulmonologist.      Persistent leukocytosis: UA neg, last WBC 12.9    Therapy: outpatient PT for joint replacement           Patient to re-establish plan of care with their PCP within 7 days after leaving TCU.     Electronically signed by: Rakan Smith NP

## 2021-06-16 NOTE — PROGRESS NOTES
[facility]      Code Status:  FULL CODE  Visit Type: H & P     Facility:  [facility] [[id number]]           History of Present Illness: Natalie Walters is a 72 y.o. female who is currently admitted to the TCU as a transfer from the hospital .  Patient here with RT TKA  This was done as an elective procedure. Patient  was admitted and under went the procedure in hospital  Post operative course complicated by PAIN MANANGEMENT  Pain management -pt is currently on Tylenol as needed along with oxycodone as needed and is reporting severe pain  For DVT prophylaxis ortho has elected to put her on aspirin 325 twice daily for 30 days   For ambulation pt is using a walker.  Distances are limited.  She has an underlying history of bronchiectasis and uses a vest at home but has not worked in the TCU and wondering if she should.  She is also reporting constipation.  Blood pressures have been in the low side.  Patient has noted some vaginal bleeding this is a patient who is not on any significant anticoagulation other than the aspirin.  She has no other medical problems.  She is on Xanax but has reported using it only once in 6 months and is okay by discontinued    Past Medical History:   Diagnosis Date     Asthma      Chronic cough      Essential tremor      Hypertension      Pneumonia 08/10/2014    pt states this is 5th occurrence     Past Surgical History:   Procedure Laterality Date     PARATHYROID GLAND SURGERY      gland removed with benign tumor     [place] NASAL/SINUS ENDOSCOPY,OPEN MAXILL SINUS      Description: Nasal Endoscopy With Maxillary Antrostomy;  Recorded: 08/20/2013;     NV REMOVE TONSILS/ADENOIDS,<13 Y/O      Description: Tonsillectomy With Adenoidectomy;  Recorded: 08/20/2013;     NV REVISION OF CERVIX,NON OBSTETRICAL      Description: Cervical Cerclage (Non-OB);  Recorded: 08/20/2013;     REPLACEMENT TOTAL KNEE Right 3/1/2018    Procedure: RIGHT TOTAL KNEE ARTHROPLASTY  COMPUTER ASSIST;  Surgeon: Sorin Lovell MD;  Location: [facility] Main OR;  Service:      SINUS SURGERY  05/2015    [place]; 3 hr sugery, bones removed     Family History   Problem Relation Age of Onset     Pancreatic cancer Mother      Arthritis Brother      Breast cancer Maternal Grandmother      Heart attack Paternal Grandfather      Colon cancer Neg Hx      Social History     Social History     Marital status:      Spouse name: N/A     Number of children: N/A     Years of education: N/A     Occupational History     Not on file.     Social History Main Topics     Smoking status: Never Smoker     Smokeless tobacco: Never Used     Alcohol use No     Drug use: No     Sexual activity: Not on file     Other Topics Concern     Not on file     Social History Narrative     Current Outpatient Prescriptions   Medication Sig Dispense Refill     acetaminophen (TYLENOL) 500 MG tablet Take 500-1,000 mg by mouth every 6 (six) hours as needed for pain.       ALPRAZolam (XANAX) 0.5 MG tablet Take 0.5 mg by mouth 3 (three) times a day as needed (Flying only).       aspirin 325 MG tablet Take 1 tablet (325 mg total) by mouth 2 (two) times a day with meals. 60 tablet 0     atorvastatin (LIPITOR) 40 MG tablet Take 40 mg by mouth daily.       calcium-vitamin D (CALCIUM-VITAMIN D) 500 mg(1,250mg) -200 unit per tablet Take 1 tablet by mouth daily.       cholecalciferol, vitamin D3, 1,000 unit tablet Take 1,000 Units by mouth daily.       docusate sodium (COLACE) 100 MG capsule Take 1 capsule (100 mg total) by mouth 2 (two) times a day as needed for constipation.  0     escitalopram oxalate (LEXAPRO) 20 MG tablet Take 20 mg by mouth daily.       ferrous sulfate 65 mg elemental iron Take 1 tablet by mouth 2 (two) times a week. Tuesdays and Fridays       fluticasone (FLONASE) 50 mcg/actuation nasal spray 1 spray into each nostril daily.       irbesartan (AVAPRO) 150 MG tablet Take 150 mg by mouth daily.       omega-3 fatty  acids (FISH OIL CONCENTRATE) 1,000 mg cap Take 1 tablet by mouth daily.       omeprazole (PRILOSEC) 40 MG capsule Take 40 mg by mouth daily before breakfast.       oxyCODONE (ROXICODONE) 5 MG immediate release tablet Take 1-2 tablets (5-10 mg total) by mouth every 4 (four) hours as needed for pain. Take 1 tab for pain 1-6/10, take 2 tabs for pain 7-10/10. 60 tablet 0     polyethylene glycol (MIRALAX) 17 gram packet Take 1 packet (17 g total) by mouth daily as needed.  0     SYMBICORT 80-4.5 mcg/actuation inhaler Inhale 1 puff 2 (two) times a day.   11     therapeutic multivitamin (THERAGRAN) tablet Take 1 tablet by mouth daily.       tolterodine (DETROL LA) 4 MG 24 hr capsule Take 4 mg by mouth as needed.        No current facility-administered medications for this visit.      Allergies   Allergen Reactions     Cat Dander Itching     Iodine      Pt. Reports rash with topical iodine.     Other Drug Allergy (See Comments) Unknown and Itching     Trees, grasses, ragweed.         Review of Systems:    Constitutional: Negative.  Negative for fever, chills, HAS activity change, appetite change and fatigue.   HENT: Negative for congestion and facial swelling.    Eyes: Negative for photophobia, redness and visual disturbance.   Respiratory: Negative for cough and chest tightness.    Cardiovascular: Negative for chest pain, palpitations and leg swelling.   Gastrointestinal: Negative for nausea, diarrhea, constipation, blood in stool and abdominal distention.   Genitourinary: Negative.    She has been reporting some vaginal bleeding  Musculoskeletal: Negative.    Complaining of severe pain in her right knee  Skin: Negative.    Neurological: Negative for dizziness, tremors, syncope, weakness, light-headedness and headaches.   Hematological: Does not bruise/bleed easily.   Psychiatric/Behavioral: Negative.      Vitals:    03/06/18 1018   BP: 116/73   Pulse: 87   Temp: 98  F (36.7  C)       Physical Exam:    GENERAL: no acute  distress. Cooperative in conversation.   HEENT: pupils are equal, round and reactive. Oral mucosa is moist and intact.  RESP:Chest symmetric. Regular respiratory rate. No stridor.  CVS: S1S2  ABD: Nondistended, soft.  EXTREMITIES: No lower extremity edema. Right TKA with an intact incision significant ecchymoses as well as edema surrounding skin  NEURO: non focal. Alert and oriented x3.   PSYCH: within normal limits. No depression or anxiety.  SKIN: warm dry intact     Labs:    Recent Results (from the past 240 hour(s))   INR (Baseline for all patients undergoing hip or knee procedures)   Result Value Ref Range    INR 1.01 0.90 - 1.10   Hemoglobin - Daily x 2   Result Value Ref Range    Hemoglobin 10.1 (L) 12.0 - 16.0 g/dL   Potassium - Tomorrow AM   Result Value Ref Range    Potassium 4.0 3.5 - 5.0 mmol/L   Hemoglobin - Daily x 2   Result Value Ref Range    Hemoglobin 10.0 (L) 12.0 - 16.0 g/dL     Xr Knee Right 1 Or 2 Vws Portable    Result Date: 3/1/2018  XR KNEE RIGHT 1 OR 2 VWS PORTABLE 3/1/2018 9:44 AM INDICATION: S/P right TKA.  Eval prosthesis. COMPARISON: None. FINDINGS: Postoperative changes of arthroplasty. Components are well seated and normally aligned. No evidence of a complication.    Assessment/Plan:   Status post right TKA done as an elective procedure  She is weightbearing as tolerated continue with incisional cares and follow-up with orthopedics as scheduled    Pain management with suboptimal control reported by patient  On Tylenol as well as oxycodone as needed  Plan is to schedule Tylenol 650 mg 4 times a day for her  Add Vistaril 25 mg to be offered with pain pills.  In addition scheduled Vistaril 50 mg at bedtime  Continue with her oxycodone as needed therapy will also try some alternative modalities    DVT prophylaxis she is on aspirin 325 twice daily for 30 days.    Constipation she was given docusate and MiraLAX as needed.  She is reporting significant constipation so we will schedule  MiraLAX daily for her.    History of unwitnessed vaginal bleeding.  I did examine her diaper and she did not have any blood staining on it.  Nursing was advised to monitor it and if she continues to have persistent bleeding she will need a gynecological exam for which she may need to see GYN or have a pelvic ultrasound done.    History of chronic anxiety she is on Lexapro 20 mg daily in addition she is also on Xanax and is okay if I discontinue it she has not taken it other than for flying.  I did advise her that I would not continue with her Xanax if I was adding Vistaril and she is agreeable.    ABLA-continue with her oral iron supplementation    Hypertension with hypotension in the hospital continue to monitor blood pressures she is running on the low side  Hyperlipidemia continue with the Lipitor    Vitamin D deficiency on supplementation  GERD    History of moderate persistent asthma with underlying history of bronchiectasis per patient she has a Vest that she uses  I have advised her to bring it and use it regularly as advised by her pulmonologist.  Debilitation here for PT OT and rehab .continue with her care plan     Total time spent was 45 minutes, more than half of it was in face-to-face counseling regarding disease state, treatment, side effects, documentation, review of clinical data and coordination of care  Electronically signed by: JESI Chan  This progress note was completed using Dragon software and there may be grammatical errors.

## 2021-06-16 NOTE — PROGRESS NOTES
Patient is currently at [facility] rehabilitating after her knee replacement.  states that she will be home in a week or two. Not sure if she will need home care yet.     Next outreach due: 4/13/18

## 2021-06-16 NOTE — ANESTHESIA POSTPROCEDURE EVALUATION
Patient: Natalie Walters  RIGHT TOTAL KNEE ARTHROPLASTY COMPUTER ASSIST  Anesthesia type: spinal    Patient location: PACU  Last vitals:   Vitals:    03/01/18 1030   BP:    Pulse:    Resp: 14   Temp:    SpO2:      Post vital signs: stable  Level of consciousness: awake and responds to simple questions  Post-anesthesia pain: pain controlled  Post-anesthesia nausea and vomiting: no  Pulmonary: unassisted, return to baseline  Cardiovascular: stable and blood pressure at baseline  Hydration: adequate  Anesthetic events: no    QCDR Measures:  ASA# 11 - Sarah-op Cardiac Arrest: ASA11B - Patient did NOT experience unanticipated cardiac arrest  ASA# 12 - Sarah-op Mortality Rate: ASA12B - Patient did NOT die  ASA# 13 - PACU Re-Intubation Rate: NA - No ETT / LMA used for case  ASA# 10 - Composite Anes Safety: ASA10A - No serious adverse event    Additional Notes:

## 2021-06-16 NOTE — ANESTHESIA PREPROCEDURE EVALUATION
Anesthesia Evaluation      Patient summary reviewed   No history of anesthetic complications     Airway   Mallampati: III  Neck ROM: full   Pulmonary - normal exam    breath sounds clear to auscultation  (+) pneumonia, asthma                           Cardiovascular - normal exam  Exercise tolerance: > or = 4 METS  (+) hypertension, , hypercholesterolemia,     Rhythm: regular  Rate: normal,         Neuro/Psych - negative ROS     Endo/Other    (+) obesity,      GI/Hepatic/Renal    (+) GERD,             Dental    (+) poor dentition and chipped                       Anesthesia Plan  Planned anesthetic: spinal  SAB with isobaric bupivacaine.  Ultrasound guided nerve block per surgeon request for post op analgesia.    ASA 3     Anesthetic plan and risks discussed with: patient  Anesthesia plan special considerations: antiemetics,   Post-op plan: routine recovery

## 2021-06-16 NOTE — ANESTHESIA PROCEDURE NOTES
Spinal Block    Patient location during procedure: OR  Start time: 3/1/2018 7:19 AM  End time: 3/1/2018 7:21 AM  Reason for block: at surgeon's request and primary anesthetic    Staffing:  Performing  Anesthesiologist: SARAH BENNETT    Preanesthetic Checklist  Completed: patient identified, risks, benefits, and alternatives discussed, timeout performed, consent obtained, at patient's request, airway assessed, oxygen available, suction available, emergency drugs available and hand hygiene performed  Spinal Block  Patient position: sitting  Prep: ChloraPrep  Patient monitoring: heart rate, cardiac monitor, continuous pulse ox and blood pressure  Approach: midline  Location: L2-3  Injection technique: single-shot  Needle type: pencil-tip   Needle gauge: 24 G      Additional Notes:  No issues.  Good block

## 2021-06-16 NOTE — PROGRESS NOTES
Patient had knee surgery this morning, spoke briefly with spouse. Son is in town from [place] to help with her recovery and getting to follow appointments.  Goals not discussed today    Next outreach due: 3/8/18

## 2021-06-16 NOTE — PROGRESS NOTES
[facility]      Code Status:  FULL CODE  Visit Type: H & P     Facility:  [facility] (formerly [facility])[3] [[id number]]           History of Present Illness: Natalie Walters is a 72 y.o. female  who is currently readmitted to the TCU as a transfer from the hospital .  She was examined in the presence of her son and a friend who requested to meet with me.  Patient here with RT TKA  This was done as an elective procedure. Patient  was admitted and under went the procedure in hospital  Post operative course complicated by PAIN MANANGEMENT  Pain management -pt is currently on Tylenol as needed along with oxycodone as needed and is reporting severe pain  For DVT prophylaxis ortho has elected to put her on aspirin 325 twice daily for 30 days                For ambulation pt is using a walker.  Distances are limited.  Patient was subsequently readmitted to the hospital with severe emesis nausea and vomiting felt to be viral gastroenteritis possibly with narcotic induced.  Imaging also revealed that she had significant amount of constipation and she was added on MiraLAX.  She was subsequently discharged back to the TCU  Nursing and her son wanted to review her pain management with me she has been requesting pain pills quite a bit even though her pain scale is 1-2 to max 3 as per her    Past Medical History:   Diagnosis Date     Asthma      Chronic cough      Essential tremor      Hypertension      Pneumonia 08/10/2014    pt states this is 5th occurrence     Past Surgical History:   Procedure Laterality Date     PARATHYROID GLAND SURGERY      gland removed with benign tumor     ID NASAL/SINUS ENDOSCOPY,OPEN MAXILL SINUS      Description: Nasal Endoscopy With Maxillary Antrostomy;  Recorded: 08/20/2013;     ID REMOVE TONSILS/ADENOIDS,<13 Y/O      Description: Tonsillectomy With Adenoidectomy;  Recorded: 08/20/2013;     ID REVISION OF CERVIX,NON OBSTETRICAL      Description: Cervical Cerclage (Non-OB);   Recorded: 08/20/2013;     REPLACEMENT TOTAL KNEE Right 3/1/2018    Procedure: RIGHT TOTAL KNEE ARTHROPLASTY COMPUTER ASSIST;  Surgeon: Sorin Lovell MD;  Location: [facility] Main OR;  Service:      SINUS SURGERY  05/2015    [facility]; 3 hr sugery, bones removed     Family History   Problem Relation Age of Onset     Pancreatic cancer Mother      Arthritis Brother      Breast cancer Maternal Grandmother      Heart attack Paternal Grandfather      Colon cancer Neg Hx      Social History     Social History     Marital status:      Spouse name: N/A     Number of children: N/A     Years of education: N/A     Occupational History     Not on file.     Social History Main Topics     Smoking status: Never Smoker     Smokeless tobacco: Never Used     Alcohol use No     Drug use: No     Sexual activity: Not on file     Other Topics Concern     Not on file     Social History Narrative     Current Outpatient Prescriptions   Medication Sig Dispense Refill     acetaminophen (TYLENOL) 650 MG CR tablet Take 650 mg by mouth 4 (four) times a day. 8 am, noon, 4 pm, 8 pm       aspirin 325 MG tablet Take 1 tablet (325 mg total) by mouth 2 (two) times a day with meals. 60 tablet 0     atorvastatin (LIPITOR) 40 MG tablet Take 40 mg by mouth daily.       CALCIUM CARBONATE (CALCIUM 500 ORAL) Take 1 tablet by mouth daily.       cholecalciferol, vitamin D3, (CHOLECALCIFEROL) 1,000 unit tablet Take 1,000 Units by mouth daily.       docusate sodium (COLACE) 100 MG capsule Take 1 capsule (100 mg total) by mouth 2 (two) times a day as needed for constipation.  0     escitalopram oxalate (LEXAPRO) 20 MG tablet Take 20 mg by mouth daily.       ferrous sulfate 65 mg elemental iron Take 1 tablet by mouth 2 (two) times a week. Tuesdays and Fridays       fluticasone (FLONASE) 50 mcg/actuation nasal spray 1 spray into each nostril daily.       hydrOXYzine pamoate (VISTARIL) 25 MG capsule Take 25 mg by mouth 3 (three) times a day. At 8am, noon,  and 4pm       hydrOXYzine pamoate (VISTARIL) 25 MG capsule Take 50 mg by mouth at bedtime. At 7pm       irbesartan (AVAPRO) 150 MG tablet Take 150 mg by mouth daily.       omega-3 fatty acids (FISH OIL CONCENTRATE) 1,000 mg cap Take 1 tablet by mouth daily.       omeprazole (PRILOSEC) 40 MG capsule Take 40 mg by mouth daily before breakfast.       oxyCODONE (ROXICODONE) 5 MG immediate release tablet Take 1-2 tablets (5-10 mg total) by mouth every 4 (four) hours as needed for pain. Take 1 tab for pain 1-6/10, take 2 tabs for pain 7-10/10. 60 tablet 0     POLYETHYLENE GLYCOL 3350 (MIRALAX ORAL) Take by mouth daily. 1 tsp       SYMBICORT 80-4.5 mcg/actuation inhaler Inhale 1 puff 2 (two) times a day.   11     therapeutic multivitamin (THERAGRAN) tablet Take 1 tablet by mouth daily.       tolterodine (DETROL LA) 4 MG 24 hr capsule Take 4 mg by mouth daily as needed.        No current facility-administered medications for this visit.      Allergies   Allergen Reactions     Cat Dander Itching     Iodine      Pt. Reports rash with topical iodine.     Other Drug Allergy (See Comments) Unknown and Itching     Trees, grasses, ragweed.         Review of Systems:    Constitutional: Negative.  Negative for fever, chills,has  activity change, appetite change and fatigue.   HENT: Negative for congestion and facial swelling.    Eyes: Negative for photophobia, redness and visual disturbance.   Respiratory: Negative for cough and chest tightness.    Cardiovascular: Negative for chest pain, palpitations and leg swelling.   Gastrointestinal: Negative for nausea, diarrhea, constipation, blood in stool and abdominal distention.   Genitourinary: Negative.    Musculoskeletal: Negative.    Complaining of pain in her knee and requesting quite a lot of pain pills as per nursing  Skin: Negative.    Neurological: Negative for dizziness, tremors, syncope, weakness, light-headedness and headaches.   Hematological: Does not bruise/bleed easily.    Psychiatric/Behavioral: Negative.      Vitals:    03/13/18 1149   BP: 128/66   Pulse: 75   Temp: 98  F (36.7  C)       Physical Exam:    GENERAL: no acute distress. Cooperative in conversation.   HEENT: pupils are equal, round and reactive. Oral mucosa is moist and intact.  RESP:Chest symmetric. Regular respiratory rate. No stridor.  CVS: S1S2  ABD: Nondistended, soft.  EXTREMITIES: No lower extremity edema. Right TKA with an intact incision significant ecchymoses as well as edema surrounding skin  NEURO: non focal. Alert and oriented x3.   PSYCH: within normal limits. No depression or anxiety.  SKIN: warm dry intact      Labs:    Recent Results (from the past 240 hour(s))   HM1 (CBC with Diff)   Result Value Ref Range    WBC 11.8 (H) 4.0 - 11.0 thou/uL    RBC 3.51 (L) 3.80 - 5.40 mill/uL    Hemoglobin 10.9 (L) 12.0 - 16.0 g/dL    Hematocrit 31.9 (L) 35.0 - 47.0 %    MCV 91 80 - 100 fL    MCH 31.1 27.0 - 34.0 pg    MCHC 34.2 32.0 - 36.0 g/dL    RDW 13.9 11.0 - 14.5 %    Platelets 194 140 - 440 thou/uL    MPV 10.9 8.5 - 12.5 fL    Neutrophils % 65 50 - 70 %    Lymphocytes % 28 20 - 40 %    Monocytes % 3 2 - 10 %    Eosinophils % 5 0 - 6 %    Basophils % 0 0 - 2 %    Neutrophils Absolute 7.5 2.0 - 7.7 thou/uL    Lymphocytes Absolute 3.3 0.8 - 4.4 thou/uL    Monocytes Absolute 0.4 0.0 - 0.9 thou/uL    Eosinophils Absolute 0.5 (H) 0.0 - 0.4 thou/uL    Basophils Absolute 0.0 0.0 - 0.2 thou/uL   ECG 12 lead nursing unit performed   Result Value Ref Range    SYSTOLIC BLOOD PRESSURE 140 mmHg    DIASTOLIC BLOOD PRESSURE 71 mmHg    VENTRICULAR RATE 82 BPM    ATRIAL RATE 82 BPM    P-R INTERVAL 152 ms    QRS DURATION 70 ms    Q-T INTERVAL 366 ms    QTC CALCULATION (BEZET) 427 ms    P Axis 43 degrees    R AXIS -12 degrees    T AXIS 38 degrees    MUSE DIAGNOSIS       Normal sinus rhythm with sinus arrhythmia  Low voltage QRS  Cannot rule out Anterior infarct , age undetermined  Abnormal ECG  When compared with ECG of  07-FEB-2018 10:26,  Questionable change in QRS axis  Confirmed by PEYTON HINOJOSA MD LOC: ([id number]) on 3/7/2018 8:17:52 AM     Basic Metabolic Panel   Result Value Ref Range    Sodium 139 136 - 145 mmol/L    Potassium 3.9 3.5 - 5.0 mmol/L    Chloride 108 (H) 98 - 107 mmol/L    CO2 22 22 - 31 mmol/L    Anion Gap, Calculation 9 5 - 18 mmol/L    Glucose 108 70 - 125 mg/dL    Calcium 8.9 8.5 - 10.5 mg/dL    BUN 15 8 - 28 mg/dL    Creatinine 0.66 0.60 - 1.10 mg/dL    GFR MDRD Af Amer >60 >60 mL/min/1.73m2    GFR MDRD Non Af Amer >60 >60 mL/min/1.73m2   Hepatic Profile   Result Value Ref Range    Bilirubin, Total 0.8 0.0 - 1.0 mg/dL    Bilirubin, Direct 0.3 <=0.5 mg/dL    Protein, Total 6.4 6.0 - 8.0 g/dL    Albumin 2.9 (L) 3.5 - 5.0 g/dL    Alkaline Phosphatase 87 45 - 120 U/L    AST 25 0 - 40 U/L    ALT 21 0 - 45 U/L   Lipase   Result Value Ref Range    Lipase 93 (H) 0 - 52 U/L   Urinalysis-UC if Indicated   Result Value Ref Range    Color, UA Yellow Colorless, Yellow, Straw, Light Yellow    Clarity, UA Clear Clear    Glucose, UA Negative Negative    Bilirubin, UA Negative Negative    Ketones, UA Negative Negative    Specific Gravity, UA 1.005 1.001 - 1.030    Blood, UA Negative Negative    pH, UA 8.0 4.5 - 8.0    Protein, UA Negative Negative mg/dL    Urobilinogen, UA <2.0 E.U./dL <2.0 E.U./dL, 2.0 E.U./dL    Nitrite, UA Negative Negative    Leukocytes, UA Negative Negative   Basic Metabolic Panel   Result Value Ref Range    Sodium 140 136 - 145 mmol/L    Potassium 5.4 (H) 3.5 - 5.0 mmol/L    Chloride 109 (H) 98 - 107 mmol/L    CO2 26 22 - 31 mmol/L    Anion Gap, Calculation 5 5 - 18 mmol/L    Glucose 95 70 - 125 mg/dL    Calcium 8.9 8.5 - 10.5 mg/dL    BUN 10 8 - 28 mg/dL    Creatinine 0.73 0.60 - 1.10 mg/dL    GFR MDRD Af Amer >60 >60 mL/min/1.73m2    GFR MDRD Non Af Amer >60 >60 mL/min/1.73m2   HM2(CBC w/o Differential)   Result Value Ref Range    WBC 9.6 4.0 - 11.0 thou/uL    RBC 3.25 (L) 3.80 - 5.40 mill/uL     Hemoglobin 10.1 (L) 12.0 - 16.0 g/dL    Hematocrit 30.3 (L) 35.0 - 47.0 %    MCV 93 80 - 100 fL    MCH 31.1 27.0 - 34.0 pg    MCHC 33.3 32.0 - 36.0 g/dL    RDW 14.1 11.0 - 14.5 %    Platelets 363 140 - 440 thou/uL    MPV 9.2 8.5 - 12.5 fL   Lipase   Result Value Ref Range    Lipase 59 (H) 0 - 52 U/L   Hepatic Profile   Result Value Ref Range    Bilirubin, Total 0.6 0.0 - 1.0 mg/dL    Bilirubin, Direct 0.2 <=0.5 mg/dL    Protein, Total 6.1 6.0 - 8.0 g/dL    Albumin 2.8 (L) 3.5 - 5.0 g/dL    Alkaline Phosphatase 78 45 - 120 U/L    AST 27 0 - 40 U/L    ALT 23 0 - 45 U/L     Ct Abdomen Pelvis Without Oral Without Iv Contrast    Addendum Date: 3/7/2018    The August 2014 CT is now available for comparison. The right lower lobe 3 mm nodule is stable, and therefore benign by time criteria. No further imaging recommended.    Result Date: 3/7/2018  CT ABDOMEN PELVIS WO ORAL WO IV CONTRAST 3/7/2018 8:43 AM INDICATION: abd pain abd pain TECHNIQUE: Routine CT abdomen and pelvis without oral or IV contrast. Multiplanar reformation images (MPR). Dose reduction techniques were used. COMPARISON: None. FINDINGS: LUNG BASES: Right lower lobe 3 mm peripheral lung nodule (series 3, image 9). Mild lung base scar or atelectasis. ABDOMEN: The liver has a benign Riedel's lobe configuration. No hepatic mass. Unenhanced adrenals, kidneys, spleen, gallbladder, and pancreas appear normal. PELVIS: Moderate amount stool in colon. MUSCULOSKELETAL: Spinal degenerative change.     CONCLUSION: 1.  Moderate amount stool in colon. 2.  Right lower lobe 3 mm nodule. Recommend comparison with any prior CT. If none available, recommend follow-up per Fleischner Society guidelines. REFERENCE: Guidelines for Management of Incidental Pulmonary Nodules Detected on CT Images: From the Fleischner Society 2017. Guidelines apply to incidental nodules in patients who are 35 years or older. Guidelines do not apply to lung cancer screening, patients with  immunosuppression, or patients with known primary cancer. SINGLE NODULE Nodule size <6 mm Low-risk patients: No follow-up needed. High-risk patients: Optional follow-up at 12 months. Nodule size 6-8 mm Low-risk patients: Follow-up CT at 6-12 months, then consider CT at 18-24 months. High-risk patients: Follow-up CT at 6-12 months, then at 18-24 months if no change. Nodule size >8 mm Either low or high-risk patients: Consider CT, PET/CT, or tissue sampling at 3 months. Consider referral to lung nodule clinic.     Xr Knee Right 1 Or 2 Vws Portable  Result Date: 3/1/2018  XR KNEE RIGHT 1 OR 2 VWS PORTABLE 3/1/2018 9:44 AM INDICATION: S/P right TKA.  Eval prosthesis. COMPARISON: None. FINDINGS: Postoperative changes of arthroplasty. Components are well seated and normally aligned. No evidence of a complication.    Us Venous Leg Right  Result Date: 3/7/2018  US VENOUS LEG RIGHT 3/7/2018 7:13 AM INDICATION: right lower extremity swelling and calf pain s/p knee surgery TECHNIQUE: Routine exam without and with compression, augmentation, and duplex utilizing 2D gray-scale imaging, Doppler interrogation with color-flow and spectral waveform analysis. COMPARISON: None. FINDINGS: The common femoral, femoral, popliteal, and segmentally visualized calf veins were evaluated. The opposite CFV was also included in the evaluation. Right leg veins are negative for deep venous thrombosis. No popliteal cysts.     CONCLUSION: 1.  Right leg veins are negative for DVT.    Assessment/Plan:    N&V-  Felt to be viral GE/ constipation    Constipation-on scheduled MiraLAX. add senna to her regimen at her request    Status post right TKA done as an elective procedure  She is weightbearing as tolerated continue with incisional cares and follow-up with orthopedics as scheduled     Pain management with suboptimal control reported by patient  On Tylenol as well as oxycodone as needed  on schedule Tylenol 650 mg 4 times a day for her.  Vistaril 4  times a day is also scheduled for her.  Nursing has been reporting that she has been requesting excessive amount of oxycodone and she was counseled in the presence of her son to monitor intake and take an appropriate dose based on her pain scale.       DVT prophylaxis she is on aspirin 325 twice daily for 30 days.  .     History of unwitnessed vaginal bleeding.  No repeat episode reported by patient     History of chronic anxiety she is on Lexapro 20 mg daily   She has been taken off Xanax at her request     ABLA-continue with her oral iron supplementation     Hypertension with hypotension in the hospital continue to monitor blood pressures she is running on the low side  Hyperlipidemia continue with the Lipitor     Vitamin D deficiency on supplementation  GERD     History of moderate persistent asthma with underlying history of bronchiectasis per patient she has a Vest that she uses  I have advised her to bring it and use it regularly as advised by her pulmonologist.  Debilitation here for PT OT and rehab .continue with her care plan  She is doing better in therapy range of movement though at the knee is still restricted.  Care plan including pain management was discussed in detail with both patient as well as her son.  I am urging her to consider minimizing narcotics if possible.    Total time spent was 45 minutes, more than half of it was in face-to-face counseling regarding disease state, treatment, side effects, documentation, review of clinical data and coordination of care    Electronically signed by: JESI Chan  This progress note was completed using Dragon software and there may be grammatical errors.

## 2021-06-16 NOTE — PROGRESS NOTES
[facility]      Code Status:  FULL CODE  Visit Type: Review Of Multiple Medical Conditions     Facility:  [facility] [[id number]]           History of Present Illness: Natalie Walters is a 72 y.o. female  who is currently readmitted to the TCU as a transfer from the hospital .  She was examined in the presence of her son and a friend who requested to meet with me.  Patient here with RT TKA  This was done as an elective procedure. Patient  was admitted and under went the procedure in hospital  Post operative course complicated by PAIN MANANGEMENT  Pain management -pt is currently on Tylenol as needed along with oxycodone as needed and is reporting severe pain  For DVT prophylaxis ortho has elected to put her on aspirin 325 twice daily for 30 days                For ambulation pt is using a walker.  Distances are limited.  Patient was subsequently readmitted to the hospital with severe emesis nausea and vomiting felt to be viral gastroenteritis possibly with narcotic induced.  Imaging also revealed that she had significant amount of constipation and she was added on MiraLAX.  She was subsequently discharged back to the TCU  Recheck labs came back essentially stable except for leukocytoses.  Subsequently patient was complaining of increasing warmth pain and tenderness in her right knee with more swelling she was urgently sent to  Orthopedics for evaluation  Fortunately for her evaluation essentially is negative and they stated that she is doing well with no new concerns for cellulitis or concerns about infection  She continues to have some pain in her knee joint but otherwise is stable.  Scheduled to discharge today but requested to see me prior to discharge.   she was examined the presence of her  as well as brother.    Past Medical History:   Diagnosis Date     Asthma      Chronic cough      Essential tremor      Hypertension      Pneumonia 08/10/2014    pt states this is  5th occurrence     Past Surgical History:   Procedure Laterality Date     PARATHYROID GLAND SURGERY      gland removed with benign tumor     [place] NASAL/SINUS ENDOSCOPY,OPEN MAXILL SINUS      Description: Nasal Endoscopy With Maxillary Antrostomy;  Recorded: 08/20/2013;     MA REMOVE TONSILS/ADENOIDS,<11 Y/O      Description: Tonsillectomy With Adenoidectomy;  Recorded: 08/20/2013;     MA REVISION OF CERVIX,NON OBSTETRICAL      Description: Cervical Cerclage (Non-OB);  Recorded: 08/20/2013;     REPLACEMENT TOTAL KNEE Right 3/1/2018    Procedure: RIGHT TOTAL KNEE ARTHROPLASTY COMPUTER ASSIST;  Surgeon: Sorin Lovell MD;  Location: [facility] OR;  Service:      SINUS SURGERY  05/2015    [facility]; 3 hr sugery, bones removed     Family History   Problem Relation Age of Onset     Pancreatic cancer Mother      Arthritis Brother      Breast cancer Maternal Grandmother      Heart attack Paternal Grandfather      Colon cancer Neg Hx      Social History     Social History     Marital status:      Spouse name: N/A     Number of children: N/A     Years of education: N/A     Occupational History     Not on file.     Social History Main Topics     Smoking status: Never Smoker     Smokeless tobacco: Never Used     Alcohol use No     Drug use: No     Sexual activity: Not on file     Other Topics Concern     Not on file     Social History Narrative     Current Outpatient Prescriptions   Medication Sig Dispense Refill     acetaminophen (TYLENOL) 650 MG CR tablet Take 650 mg by mouth 4 (four) times a day. 8 am, noon, 4 pm, 8 pm       aspirin 325 MG tablet Take 1 tablet (325 mg total) by mouth 2 (two) times a day with meals. 60 tablet 0     atorvastatin (LIPITOR) 40 MG tablet Take 40 mg by mouth daily.       CALCIUM CARBONATE (CALCIUM 500 ORAL) Take 1 tablet by mouth daily.       cholecalciferol, vitamin D3, (CHOLECALCIFEROL) 1,000 unit tablet Take 1,000 Units by mouth daily.       docusate sodium (COLACE) 100 MG capsule  Take 1 capsule (100 mg total) by mouth 2 (two) times a day as needed for constipation.  0     escitalopram oxalate (LEXAPRO) 20 MG tablet Take 20 mg by mouth daily.       ferrous sulfate 65 mg elemental iron Take 1 tablet by mouth 2 (two) times a week. Tuesdays and Fridays       fluticasone (FLONASE) 50 mcg/actuation nasal spray 1 spray into each nostril daily.       hydrOXYzine pamoate (VISTARIL) 25 MG capsule Take 25 mg by mouth 3 (three) times a day. At 8am, noon, and 4pm       hydrOXYzine pamoate (VISTARIL) 25 MG capsule Take 50 mg by mouth at bedtime. At 7pm       irbesartan (AVAPRO) 150 MG tablet Take 150 mg by mouth daily.       omega-3 fatty acids (FISH OIL CONCENTRATE) 1,000 mg cap Take 1 tablet by mouth daily.       omeprazole (PRILOSEC) 40 MG capsule Take 40 mg by mouth daily before breakfast.       oxyCODONE (ROXICODONE) 5 MG immediate release tablet Take 1-2 tablets (5-10 mg total) by mouth every 4 (four) hours as needed for pain. Take 1 tab for pain 1-6/10, take 2 tabs for pain 7-10/10. 60 tablet 0     POLYETHYLENE GLYCOL 3350 (MIRALAX ORAL) Take by mouth daily. 1 tsp       SYMBICORT 80-4.5 mcg/actuation inhaler Inhale 1 puff 2 (two) times a day.   11     therapeutic multivitamin (THERAGRAN) tablet Take 1 tablet by mouth daily.       tolterodine (DETROL LA) 4 MG 24 hr capsule Take 4 mg by mouth daily as needed.        No current facility-administered medications for this visit.      Allergies   Allergen Reactions     Cat Dander Itching     Iodine      Pt. Reports rash with topical iodine.     Other Drug Allergy (See Comments) Unknown and Itching     Trees, grasses, ragweed.         Review of Systems:    Constitutional: Negative.  Negative for fever, chills,has  activity change, appetite change and fatigue.   HENT: Negative for congestion and facial swelling.    Eyes: Negative for photophobia, redness and visual disturbance.   Respiratory: Negative for cough and chest tightness.    Cardiovascular:  Negative for chest pain, palpitations and leg swelling.   Gastrointestinal: Negative for nausea, diarrhea, constipation, blood in stool and abdominal distention.   Genitourinary: Negative.    Musculoskeletal: Negative.    Complaining of pain in her knee .  Skin: Negative.    Neurological: Negative for dizziness, tremors, syncope, weakness, light-headedness and headaches.   Hematological: Does not bruise/bleed easily.   Psychiatric/Behavioral: Negative.      Vitals:    03/20/18 1027   BP: 112/70   Pulse: 83   Temp: 98  F (36.7  C)       Physical Exam:    GENERAL: no acute distress. Cooperative in conversation.   HEENT: pupils are equal, round and reactive. Oral mucosa is moist and intact.  RESP:Chest symmetric. Regular respiratory rate. No stridor.  CVS: S1S2  ABD: Nondistended, soft.  EXTREMITIES: No lower extremity edema. Right TKA with an intact incision.  No evidence of surrounding skin warmth or cellulitis  NEURO: non focal. Alert and oriented x3.   PSYCH: within normal limits. No depression or anxiety.  SKIN: warm dry intact      Labs:    Lab Results   Component Value Date    WBC 14.9 (H) 03/13/2018    HGB 10.5 (L) 03/13/2018    HCT 32.8 (L) 03/13/2018    MCV 96 03/13/2018     (H) 03/13/2018     Recent Results (from the past 120 hour(s))   Urinalysis    Collection Time: 03/17/18  4:58 PM   Result Value Ref Range    Color, UA Yellow Colorless, Yellow, Straw, Light Yellow    Clarity, UA Clear Clear    Glucose, UA Negative Negative    Bilirubin, UA Negative Negative    Ketones, UA Negative Negative    Specific Gravity, UA 1.018 1.001 - 1.030    Blood, UA Negative Negative    pH, UA 5.0 4.5 - 8.0    Protein, UA Negative Negative mg/dL    Urobilinogen, UA <2.0 E.U./dL <2.0 E.U./dL, 2.0 E.U./dL    Nitrite, UA Negative Negative    Leukocytes, UA Negative Negative   Culture, Urine    Collection Time: 03/17/18  4:58 PM   Result Value Ref Range    Culture No Growth      Assessment/Plan:    N&V-  Felt to be viral  GE/ constipation.  Resolved    Constipation-on scheduled MiraLAX. add senna to her regimen at her request    Status post right TKA done as an elective procedure  She is weightbearing as tolerated continue with incisional cares and follow-up with orthopedics as scheduled.  Ambulating using a walker     Pain management -she is on Tylenol along with oxycodone and Vistaril  Discharge planning was reviewed and we did talk about switching to tramadol but patient has been refused tramadol and want to continue and oxycodone as needed     DVT prophylaxis she is on aspirin 325 twice daily for 30 days.  .  History of unwitnessed vaginal bleeding.  No repeat episode reported by patient     History of chronic anxiety she is on Lexapro 20 mg daily   She has been taken off Xanax at her request     ABLA-continue with her oral iron supplementation.  Hemoglobin results were reviewed with her     Hypertension with hypotension in the hospital continue to monitor blood pressures she is running on the low side  Hyperlipidemia continue with the Lipitor     Vitamin D deficiency on supplementation  GERD     History of moderate persistent asthma with underlying history of bronchiectasis per patient she has a Vest that she uses    Leukocytosis with workup so far negative including a UA she will need to recheck white count in a few days  Discharge planning reviewed with patient and  and brother pain management as well as follow-up issues were all reviewed.    Total time spent was 35 minutes, more than half of it was in face-to-face counseling regarding disease state, treatment, side effects, documentation, review of clinical data and coordination of care    Electronically signed by: JESI Chan  This progress note was completed using Dragon software and there may be grammatical errors.

## 2021-06-16 NOTE — PROGRESS NOTES
[facility]      Code Status:  FULL CODE  Visit Type: Review Of Multiple Medical Conditions     Facility:  [facility] [[id number]]           History of Present Illness: Natalie Walters is a 72 y.o. female  who is currently readmitted to the TCU as a transfer from the hospital .  She was examined in the presence of her son and a friend who requested to meet with me.  Patient here with RT TKA  This was done as an elective procedure. Patient  was admitted and under went the procedure in hospital  Post operative course complicated by PAIN MANANGEMENT  Pain management -pt is currently on Tylenol as needed along with oxycodone as needed and is reporting severe pain  For DVT prophylaxis ortho has elected to put her on aspirin 325 twice daily for 30 days                For ambulation pt is using a walker.  Distances are limited.  Patient was subsequently readmitted to the hospital with severe emesis nausea and vomiting felt to be viral gastroenteritis possibly with narcotic induced.  Imaging also revealed that she had significant amount of constipation and she was added on MiraLAX.  She was subsequently discharged back to the TCU  Recheck labs came back essentially stable except for leukocytoses.  Subsequently patient was complaining of increasing warmth pain and tenderness in her right knee with more swelling she was urgently sent to  Orthopedics for evaluation  Fortunately for her evaluation essentially is negative and they stated that she is doing well with no new concerns for cellulitis or concerns about infection  She continues to have some pain in her knee joint but otherwise is stable    Past Medical History:   Diagnosis Date     Asthma      Chronic cough      Essential tremor      Hypertension      Pneumonia 08/10/2014    pt states this is 5th occurrence     Past Surgical History:   Procedure Laterality Date     PARATHYROID GLAND SURGERY      gland removed with benign tumor      [place] NASAL/SINUS ENDOSCOPY,OPEN MAXILL SINUS      Description: Nasal Endoscopy With Maxillary Antrostomy;  Recorded: 08/20/2013;     [place] REMOVE TONSILS/ADENOIDS,<13 Y/O      Description: Tonsillectomy With Adenoidectomy;  Recorded: 08/20/2013;     [place] REVISION OF CERVIX,NON OBSTETRICAL      Description: Cervical Cerclage (Non-OB);  Recorded: 08/20/2013;     REPLACEMENT TOTAL KNEE Right 3/1/2018    Procedure: RIGHT TOTAL KNEE ARTHROPLASTY COMPUTER ASSIST;  Surgeon: Sorin Lovell MD;  Location: [facility] OR;  Service:      SINUS SURGERY  05/2015    [facility]; 3 hr sugery, bones removed     Family History   Problem Relation Age of Onset     Pancreatic cancer Mother      Arthritis Brother      Breast cancer Maternal Grandmother      Heart attack Paternal Grandfather      Colon cancer Neg Hx      Social History     Social History     Marital status:      Spouse name: N/A     Number of children: N/A     Years of education: N/A     Occupational History     Not on file.     Social History Main Topics     Smoking status: Never Smoker     Smokeless tobacco: Never Used     Alcohol use No     Drug use: No     Sexual activity: Not on file     Other Topics Concern     Not on file     Social History Narrative     Current Outpatient Prescriptions   Medication Sig Dispense Refill     acetaminophen (TYLENOL) 650 MG CR tablet Take 650 mg by mouth 4 (four) times a day. 8 am, noon, 4 pm, 8 pm       aspirin 325 MG tablet Take 1 tablet (325 mg total) by mouth 2 (two) times a day with meals. 60 tablet 0     atorvastatin (LIPITOR) 40 MG tablet Take 40 mg by mouth daily.       CALCIUM CARBONATE (CALCIUM 500 ORAL) Take 1 tablet by mouth daily.       cholecalciferol, vitamin D3, (CHOLECALCIFEROL) 1,000 unit tablet Take 1,000 Units by mouth daily.       docusate sodium (COLACE) 100 MG capsule Take 1 capsule (100 mg total) by mouth 2 (two) times a day as needed for constipation.  0     escitalopram oxalate (LEXAPRO) 20 MG tablet Take  20 mg by mouth daily.       ferrous sulfate 65 mg elemental iron Take 1 tablet by mouth 2 (two) times a week. Tuesdays and Fridays       fluticasone (FLONASE) 50 mcg/actuation nasal spray 1 spray into each nostril daily.       hydrOXYzine pamoate (VISTARIL) 25 MG capsule Take 25 mg by mouth 3 (three) times a day. At 8am, noon, and 4pm       hydrOXYzine pamoate (VISTARIL) 25 MG capsule Take 50 mg by mouth at bedtime. At 7pm       irbesartan (AVAPRO) 150 MG tablet Take 150 mg by mouth daily.       omega-3 fatty acids (FISH OIL CONCENTRATE) 1,000 mg cap Take 1 tablet by mouth daily.       omeprazole (PRILOSEC) 40 MG capsule Take 40 mg by mouth daily before breakfast.       oxyCODONE (ROXICODONE) 5 MG immediate release tablet Take 1-2 tablets (5-10 mg total) by mouth every 4 (four) hours as needed for pain. Take 1 tab for pain 1-6/10, take 2 tabs for pain 7-10/10. 60 tablet 0     POLYETHYLENE GLYCOL 3350 (MIRALAX ORAL) Take by mouth daily. 1 tsp       SYMBICORT 80-4.5 mcg/actuation inhaler Inhale 1 puff 2 (two) times a day.   11     therapeutic multivitamin (THERAGRAN) tablet Take 1 tablet by mouth daily.       tolterodine (DETROL LA) 4 MG 24 hr capsule Take 4 mg by mouth daily as needed.        No current facility-administered medications for this visit.      Allergies   Allergen Reactions     Cat Dander Itching     Iodine      Pt. Reports rash with topical iodine.     Other Drug Allergy (See Comments) Unknown and Itching     Trees, grasses, ragweed.         Review of Systems:    Constitutional: Negative.  Negative for fever, chills,has  activity change, appetite change and fatigue.   HENT: Negative for congestion and facial swelling.    Eyes: Negative for photophobia, redness and visual disturbance.   Respiratory: Negative for cough and chest tightness.    Cardiovascular: Negative for chest pain, palpitations and leg swelling.   Gastrointestinal: Negative for nausea, diarrhea, constipation, blood in stool and  abdominal distention.   Genitourinary: Negative.    Musculoskeletal: Negative.    Complaining of pain in her knee and was having more swelling and discomfort in her knee yesterday and was seen by orthopedics.  Skin: Negative.    Neurological: Negative for dizziness, tremors, syncope, weakness, light-headedness and headaches.   Hematological: Does not bruise/bleed easily.   Psychiatric/Behavioral: Negative.      Vitals:    03/15/18 1726   BP: 122/82   Pulse: 68   Resp: 18   Temp: 98  F (36.7  C)       Physical Exam:    GENERAL: no acute distress. Cooperative in conversation.   HEENT: pupils are equal, round and reactive. Oral mucosa is moist and intact.  RESP:Chest symmetric. Regular respiratory rate. No stridor.  CVS: S1S2  ABD: Nondistended, soft.  EXTREMITIES: No lower extremity edema. Right TKA with an intact incision.  No evidence of surrounding skin warmth or cellulitis  NEURO: non focal. Alert and oriented x3.   PSYCH: within normal limits. No depression or anxiety.  SKIN: warm dry intact      Labs:    Recent Results (from the past 120 hour(s))   Basic Metabolic Panel    Collection Time: 03/13/18  9:53 AM   Result Value Ref Range    Sodium 140 136 - 145 mmol/L    Potassium 4.3 3.5 - 5.0 mmol/L    Chloride 106 98 - 107 mmol/L    CO2 25 22 - 31 mmol/L    Anion Gap, Calculation 9 5 - 18 mmol/L    Glucose 90 70 - 125 mg/dL    Calcium 9.9 8.5 - 10.5 mg/dL    BUN 16 8 - 28 mg/dL    Creatinine 0.78 0.60 - 1.10 mg/dL    GFR MDRD Af Amer >60 >60 mL/min/1.73m2    GFR MDRD Non Af Amer >60 >60 mL/min/1.73m2   HM2(CBC w/o Differential)    Collection Time: 03/13/18  9:53 AM   Result Value Ref Range    WBC 14.9 (H) 4.0 - 11.0 thou/uL    RBC 3.42 (L) 3.80 - 5.40 mill/uL    Hemoglobin 10.5 (L) 12.0 - 16.0 g/dL    Hematocrit 32.8 (L) 35.0 - 47.0 %    MCV 96 80 - 100 fL    MCH 30.7 27.0 - 34.0 pg    MCHC 32.0 32.0 - 36.0 g/dL    RDW 14.6 (H) 11.0 - 14.5 %    Platelets 510 (H) 140 - 440 thou/uL    MPV 10.0 8.5 - 12.5 fL    Magnesium    Collection Time: 03/13/18  9:53 AM   Result Value Ref Range    Magnesium 2.2 1.8 - 2.6 mg/dL   Vitamin D, Total (25-Hydroxy)    Collection Time: 03/13/18  9:53 AM   Result Value Ref Range    Vitamin D, Total (25-Hydroxy) 58.1 30.0 - 80.0 ng/mL     Assessment/Plan:    N&V-  Felt to be viral GE/ constipation    Constipation-on scheduled MiraLAX. add senna to her regimen at her request    Status post right TKA done as an elective procedure  She is weightbearing as tolerated continue with incisional cares and follow-up with orthopedics as scheduled.  Sent back to orthopedics for urgent evaluation due to patient subjective concern of increased warmth pain and tenderness and elevated white count.  Orthosorb this patient and feels that she is stable and have recommended her to continue with therapy and use pain pills appropriately     Pain management with suboptimal control reported by patient  On Tylenol as well as oxycodone as needed  on schedule Tylenol 650 mg 4 times a day for her.  Vistaril 4 times a day is also scheduled for her.  Continues to have significant pain     DVT prophylaxis she is on aspirin 325 twice daily for 30 days.  .  History of unwitnessed vaginal bleeding.  No repeat episode reported by patient     History of chronic anxiety she is on Lexapro 20 mg daily   She has been taken off Xanax at her request     ABLA-continue with her oral iron supplementation     Hypertension with hypotension in the hospital continue to monitor blood pressures she is running on the low side  Hyperlipidemia continue with the Lipitor     Vitamin D deficiency on supplementation  GERD     History of moderate persistent asthma with underlying history of bronchiectasis per patient she has a Vest that she uses  I have advised her to bring it and use it regularly as advised by her pulmonologist.  Debilitation here for PT OT and rehab .continue with her care plan  She is doing better in therapy range of movement  though at the knee is still restricted.    Due to persistent leukocytosis we will recheck another CBC.  In addition I am going to check her urine on her  Continue to monitor her knee closely orthopedic referral notes were reviewed and her knee exam also appears to be benign today.  Patient was reassured.    Total time spent was 35 minutes, more than half of it was in face-to-face counseling regarding disease state, treatment, side effects, documentation, review of clinical data and coordination of care    Electronically signed by: JESI Chan  This progress note was completed using Dragon software and there may be grammatical errors.

## 2021-06-17 NOTE — PROGRESS NOTES
ASSESSMENT and PLAN:  1. Anemia  She would like to know where her post-op anemia is.  We'll recheck today.  She's still on iron 2x/week.  - HM2(CBC w/o Differential)    2. Status post total right knee replacement  She's recovering well.  We'll do a refill of her tramadol.    3. Urinary incontinence  We'll try the bid dosing to see if that helps her.  - tolterodine (DETROL) 2 MG tablet; Take 1 tablet (2 mg total) by mouth 2 (two) times a day.  Dispense: 180 tablet; Refill: 1      Medications Discontinued During This Encounter   Medication Reason     aspirin 325 MG tablet      CALCIUM CARBONATE (CALCIUM 500 ORAL)      docusate sodium (COLACE) 100 MG capsule      hydrOXYzine pamoate (VISTARIL) 25 MG capsule      omega-3 fatty acids (FISH OIL CONCENTRATE) 1,000 mg cap      oxyCODONE (ROXICODONE) 5 MG immediate release tablet Therapy completed     polyethylene glycol (MIRALAX) 17 gram/dose powder      tolterodine (DETROL LA) 4 MG 24 hr capsule        No Follow-up on file.    Patient Instructions   I'm glad you're doing so well!    I'll refill the tramadol for you.    I'll send in the detrol so you can take it twice daily.    I suggest compression stockings for your flight.    Take care!      CHIEF COMPLAINT:  Chief Complaint   Patient presents with     Follow-up     Knee replacement and Tramadol       HISTORY OF PRESENT ILLNESS:  Natalie Walters is a 73 y.o. female  presenting to the clinic today for follow up of our last visit when I saw her for her post op knee visit.  She's doing much better.  Her pain is much less.  She's able to go shopping, be active in her house and walk her dogs.  She's using tramadol, but only a few times per week.  She would like to take it at bedtime on nights when her pain keeps her awake.  Her nausea is improved.  She graduated from PT.  She's not limping.  She's getting up at night to urinate several times.  Her detrol LA disn't lasting long enough.  It works during the day, but not at night.        REVIEW OF SYSTEMS:    All other systems are negative.    PFSH:  She's going to be taking a trip to [place] to visit her grand babies.  It's a 2.5 hr flight.        TOBACCO USE:  History   Smoking Status     Never Smoker   Smokeless Tobacco     Never Used       VITALS:  Vitals:    05/02/18 1126   BP: 122/70   Patient Site: Right Arm   Pulse: 84   Weight: 128 lb 1 oz (58.1 kg)     Wt Readings from Last 3 Encounters:   05/02/18 128 lb 1 oz (58.1 kg)   03/27/18 131 lb 9 oz (59.7 kg)   03/20/18 138 lb (62.6 kg)     PHYSICAL EXAM:  Constitutional:  Reveals an alert, pleasant adult female.   Vitals:  Noted.     MEDICATIONS:  Current Outpatient Prescriptions   Medication Sig Dispense Refill     acetaminophen (TYLENOL) 650 MG CR tablet Take 650 mg by mouth 4 (four) times a day. 8 am, noon, 4 pm, 8 pm       aspirin 81 MG EC tablet Take 81 mg by mouth daily.       atorvastatin (LIPITOR) 40 MG tablet Take 40 mg by mouth daily.       cholecalciferol, vitamin D3, (CHOLECALCIFEROL) 1,000 unit tablet Take 1,000 Units by mouth daily.       escitalopram oxalate (LEXAPRO) 20 MG tablet Take 20 mg by mouth daily.       ferrous sulfate 65 mg elemental iron Take 1 tablet by mouth 2 (two) times a week. Tuesdays and Fridays       fluticasone (FLONASE) 50 mcg/actuation nasal spray 1 spray into each nostril daily.       irbesartan (AVAPRO) 150 MG tablet Take 150 mg by mouth daily.       omeprazole (PRILOSEC) 40 MG capsule Take 1 capsule (40 mg total) by mouth 2 (two) times a day before meals. (Patient taking differently: Take 40 mg by mouth daily before breakfast. ) 180 capsule 1     SYMBICORT 80-4.5 mcg/actuation inhaler Inhale 1 puff 2 (two) times a day.   11     therapeutic multivitamin (THERAGRAN) tablet Take 1 tablet by mouth daily.       traMADol (ULTRAM) 50 mg tablet Take 1-2 tablets ( mg total) by mouth every 8 (eight) hours as needed for pain. 90 tablet 0     tolterodine (DETROL) 2 MG tablet Take 1 tablet (2 mg total)  by mouth 2 (two) times a day. 180 tablet 1     No current facility-administered medications for this visit.

## 2021-06-17 NOTE — PROGRESS NOTES
Spoke with patient about care guide transition. Gave CG's new phone number. She is currently recovering from knee replacement and her spouse is not doing well right now (also [facility] pt). She would like a call from CG in about a week.       Next Outreach: 5/3/18

## 2021-06-17 NOTE — PROGRESS NOTES
"Pt reports that she is doing ok but was in to see PCP on 05/02/2018 due to knee pain.  Pt stated that nobody told her, before surgery, that it can take up to 6 months to heal.  Pt was under the impression that she would be ok by the time she finished PT.  Pt reports surgery took place about 8 weeks ago and she is still in quite a bit of pain.  Pt states that PT recommended pt get a \"roller\" to roll over her leg, along the sides and top as tolerated, to try to minimize pain and scarred tissue.  Pt will start using that today.  Pt is quite concerned about her  right now.  Ngozi stated that she is leaving to see her grandchildren on 05/16/2018 to return on 05/22/2018.  Pt reports that she is the person that takes care of her  and he is not coming along on this trip.  CG asked if they had looked into the private pay e care that was suggested at RN assessment and pt states that she and spouse have discussed numerous times but pt is not onboard with this option.  Ngozi also stated that they now have family that lives close enough to go check on her spouse while she's out of town but spouse is also refusing that.  Pt wondering if CG would be willing to do a daily check I with pt while she's gone and CG agreed to do so.  CG explained to pt that she should mention this to pt so that he is not upset when CG is calling daily for the net week.  Pt stated she would do so.  Ngozi also stated that they have not had any success with finding a dnetal clinic and just to surgically remove pt's teeth the surgery would be $18,000 which doesn't include everything.  CG suggested Ngozi contact the medical insurance company to find out if this procedure were to be done in a hospital setting, would this be considered medical or dental.  Ngozi will check on that stating if that is the case, and it would be covered under medical, they would definitely be able to afford the dentures.  CG also suggested that Ngozi contact a few " dental clinics to find out who does payment arrangements on dentures.  CG explained that they may offer a payment plan but probably wouldn't get the set of dentures until it's paid off.  Pt has no other needs at this time.

## 2021-06-18 NOTE — PROGRESS NOTES
Scheduled F/U Call:  Attempt 1    Attempt 1: Care Guide called patient.  If this patient is returning my call please transfer to Tejal Joel at ext  [phone].    Next [facility] Outreach date: 6/25/18

## 2021-06-19 NOTE — PROGRESS NOTES
2nd Monthly F/U Call:  Care Guide called patient.  If this patient is returning my call please transfer to Tejal Joel at ext  [phone]. I have called this patient and have been unsuccessful in reaching this patient for 2 months now.  This patient has also not returned any of my messages.   I will continue attempting to reach out to this patient in one month. I will also check this patient s chart for upcoming appointments, ER reports that may contain a new phone number, or any other recent activity.      Next [facility] Outreach date: 8/3/18

## 2021-06-19 NOTE — PROGRESS NOTES
Scheduled F/U Call: Attempt 3  Attempt 3: Care Guide called patient.  If this patient is returning my call please transfer to Tejal Joel at ext  [phone]. I have called (patient) 3 times over the past two weeks and have been unsuccessful in reaching his/her. This patient has also not returned any of my messages.   I will continue attempting to reach out to this patient in one month. I will also check this patient s chart for upcoming appointments, ER reports that may contain a new phone number, or any other recent activity.  Next [facility] Outreach date: 8/3/18

## 2021-06-20 NOTE — LETTER
Letter by Marilou Chappell MD at      Author: Marilou Chappell MD Service: -- Author Type: --    Filed:  Encounter Date: 3/4/2020 Status: (Other)         March 4, 2020     Patient: Natalie Walters   YOB: 1945   Date of Visit: 3/4/2020       To Whom it May Concern:    Natalie Walters was seen in my clinic on 3/4/2020.    If you have any questions or concerns, please don't hesitate to call.    Sincerely,         Electronically signed by Marilou Chappell MD

## 2021-06-20 NOTE — LETTER
Letter by Frida Viera RN at      Author: Frida Viera RN Service: -- Author Type: --    Filed:  Encounter Date: 5/8/2020 Status: (Other)       5/8/2020        Natalie Waltesr  [address] [place] [address]    COVID-19 Antibody Screen   Date Value Ref Range Status   05/06/2020 Negative  Final     Comment:     No COVID-19 antibodies detected.  Patients within 10 days of symptom onset for  COVID-19 may not produce sufficient levels of detectable antibodies.  Immunocompromised COVID-19 patients may take longer to develop antibodies.       You have tested NEGATIVE for COVID-19 antibodies. This suggests you have not had or been exposed to COVID-19. But it does not mean that for sure.    The test finds antibodies in most people 10 days after they get sick. For some people, it takes longer than 10 days for antibodies to show up. Others may never show antibodies against COVID-19, especially if they have weak immune systems.    If you have COVID-19 symptoms now, please stay home and away from others.     Your current symptoms may or may not be COVID-19.     What is antibody testing?  This is a kind of blood test. We take a small sample of your blood, and then test it for something called antibodies.   Your body makes antibodies to fight infection. If your blood has antibodies for a certain germ, it means youve been infected with that germ in the past.   Sometimes, antibodies stay in your body for years after youve had the infection. They can be there even if the germ didnt make you sick. They are a sign that your body fought off the infection.  Will this test find antibodies in everyone whos had COVID-19?  No. The test finds antibodies in most people 10 days after they get sick. For some people, it takes longer than 10 days for antibodies to show up. Others may never show antibodies against COVID-19, especially if they have weak immune systems.  What are the signs of COVID-19?  Signs of COVID-19 can appear  from 2 to 14 days (up to 2 weeks) after youre infected. Some people have no symptoms or only mild symptoms. Others get very sick. The most common symptoms are:      Cough    Shortness of breath or trouble breathing    Or at least 2 of these symptoms:      Fever    Chills    Repeated shaking with chills    Muscle pain    Headache    Sore throat    Losing your sense of taste or smell    You may have other symptoms. Please contact your doctor or clinic for any symptoms that worry you.    Where can I get more information?     To learn the [facility] guidelines for staying home, please visit the [place] Department of Health website at [email or web address]    To learn more about COVID-19 and how to care for yourself at home, please visit the CDC website at https://www.cdc.gov/coronavirus/2019-ncov/about/steps-when-sick.html    For more options for care at [facility], please visit our website at [email or web address]    [facility] ([facility]) COVID-19 Hotline:  [phone]      You have tested NEGATIVE for COVID-19 antibodies. This suggests you have not had or been exposed to COVID-19. But it does not mean that for sure.   The test finds antibodies in most people 10 days after they get sick. For some people, it takes longer than 10 days for antibodies to show up. Others may never show antibodies against COVID-19, especially if they have weak immune systems.  If you have COVID-19 symptoms now, please stay home and away from others.   What is antibody testing?  This is a kind of blood test. We take a small sample of your blood, and then test it for something called antibodies.   Your body makes antibodies to fight infection. If your blood has antibodies for a certain germ, it means youve been infected with that germ in the past.   Sometimes, antibodies stay in your body for years after youve had the infection. They can be there even if the germ  didnt make you sick. They are a sign that your body fought off the infection.  Will this test find antibodies in everyone whos had COVID-19?  No. The test finds antibodies in most people 10 days after they get sick. For some people, it takes longer than 10 days for antibodies to show up. Others may never show antibodies against COVID-19, especially if they have weak immune systems.  What does it mean if the test finds COVID-19 antibodies?  If we find these antibodies, it suggests:     This person has had the virus.     Their bodys immune system fought the virus.   We dont know if this will help protect someone from getting COVID-19 again. Scientists are still learning about this.  What are the signs of COVID-19?  Signs of COVID-19 can appear from 2 to 14 days (up to 2 weeks) after youre infected. Some people have no symptoms or only mild symptoms. Others get very sick. The most common symptoms are:    Cough    Shortness of breath or trouble breathing      Or at least 2 of these symptoms:      Fever    Chills    Repeated shaking with chills    Muscle pain    Headache    Sore throat    Losing your sense of taste or smell    You may have other symptoms. Please contact your doctor or clinic for any symptoms that worry you.    Where can I get more information?     To learn the [facility] guidelines for staying home, please visit the [place] Department of Health website at [email or web address]    To learn more about COVID-19 and how to care for yourself at home, please visit the CDC website at https://www.cdc.gov/coronavirus/2019-ncov/about/steps-when-sick.html    For more options for care at [facility], please visit our website at [email or web address]    [facility] ([facility]) COVID-19 Hotline:  [phone]

## 2021-06-20 NOTE — PROGRESS NOTES
Called Patient cant talk right now but will call back back between 2-3 PM today         This was the 2nd time I called:     I have called Natalie several times over the past 3 months now and sent them a letter.  This patient has not returned any of my messages.  I will continue attempting to reach out to this patient in 3 months.  I will also check this patient's chart for upcoming appointments, ER reports that may contain a new phone number, or any other recent activity.  I have informed the primary care provider by tasking regarding the lack of successful follow up.

## 2021-06-22 NOTE — PROGRESS NOTES
Discussion:  Patient could not really talk right now she is at the t, Care guide asked what would be a good time to call her back and she is super busy right now and she would like to call back after the new year..        Last note:    Called Patient cant talk right now but will call back back between 2-3 PM today         This was the 2nd time I called:     I have called Natalie several times over the past 3 months now and sent them a letter.  This patient has not returned any of my messages.  I will continue attempting to reach out to this patient in 3 months.  I will also check this patient's chart for upcoming appointments, ER reports that may contain a new phone number, or any other recent activity.  I have informed the primary care provider by tasking regarding the lack of successful follow up.

## 2021-06-22 NOTE — TELEPHONE ENCOUNTER
Pt informed that script was sent. She had already picked up script and is already starting to feel better.

## 2021-06-22 NOTE — TELEPHONE ENCOUNTER
RN cannot approve Refill Request    RN can NOT refill this medication historical medication requested. Last office visit: 5/2/2018 Jocelin Kerr MD Last Physical: 2/7/2018 Last MTM visit: Visit date not found Last visit same specialty: 5/2/2018 Jocelin Kerr MD.  Next visit within 3 mo: Visit date not found  Next physical within 3 mo: Visit date not found      Will Vidales, [facility] Triage/Med Refill 1/5/2019    Requested Prescriptions   Pending Prescriptions Disp Refills     fluticasone (FLONASE) 50 mcg/actuation nasal spray [Pharmacy Med Name: FLUTICASONE PROP 50 MCG SPRAY]  2     Sig: TAKE 2 PUFFS EACH NOSTRIL EVERY DAY    Nasal Steroid Refill Protocol Passed - 1/4/2019 10:02 AM       Passed - Patient has had office visit/physical in last 2 years    Last office visit with prescriber/PCP: 5/2/2018 OR same dept: 5/2/2018 Jocelin Kerr MD OR same specialty: 5/2/2018 Jocelin Kerr MD Last physical: 2/7/2018 Last MTM visit: Visit date not found    Next appt within 3 mo: Visit date not found  Next physical within 3 mo: Visit date not found  Prescriber OR PCP: Jocelin Kerr MD  Last diagnosis associated with med order: There are no diagnoses linked to this encounter.   If protocol passes may refill for 12 months if within 3 months of last provider visit (or a total of 15 months).

## 2021-06-22 NOTE — PROGRESS NOTES
Assessment and Plan     Natalie was seen today for sinus problem.    Diagnoses and all orders for this visit:    Viral URI    Acute maxillary sinusitis, recurrence not specified         HPI     Chief Complaint   Patient presents with     Sinus Problem     cough, pressure in Rt side of face, thick mucus, has tried a OTC Mucinex, worse in the morning with neck pain, x 4 days       Natalie Walters is a 73 y.o. female seen today for URI symptoms and some facial discomfort.    Four days of rhinorrhea, nasal congestion, pain over right maxillary sinus.  Occasional cough, seems improved.  History of bronchiectasis, uses a smart vest.    Denies fever, chills.  Denies CP, dyspnea, BUSTAMANTE.  Denies headache, changes in vision, facial numbness.    Current Outpatient Medications:      acetaminophen (TYLENOL) 650 MG CR tablet, Take 650 mg by mouth 4 (four) times a day. 8 am, noon, 4 pm, 8 pm, Disp: , Rfl:      aspirin 81 MG EC tablet, Take 81 mg by mouth daily., Disp: , Rfl:      atorvastatin (LIPITOR) 40 MG tablet, Take 1 tablet (40 mg total) by mouth daily., Disp: 90 tablet, Rfl: 3     cholecalciferol, vitamin D3, (CHOLECALCIFEROL) 1,000 unit tablet, Take 1,000 Units by mouth daily., Disp: , Rfl:      escitalopram oxalate (LEXAPRO) 20 MG tablet, TAKE 1 TABLET (20 MG TOTAL) BY MOUTH DAILY., Disp: 90 tablet, Rfl: 1     ferrous sulfate 65 mg elemental iron, Take 1 tablet by mouth 2 (two) times a week. Tuesdays and Fridays, Disp: , Rfl:      fluticasone (FLONASE) 50 mcg/actuation nasal spray, 1 spray into each nostril daily., Disp: , Rfl:      irbesartan (AVAPRO) 150 MG tablet, Take 150 mg by mouth daily., Disp: , Rfl:      irbesartan (AVAPRO) 150 MG tablet, TAKE 1 TABLET (150 MG TOTAL) BY MOUTH DAILY., Disp: 90 tablet, Rfl: 3     omeprazole (PRILOSEC) 40 MG capsule, Take 1 capsule (40 mg total) by mouth daily., Disp: 90 capsule, Rfl: 1     SYMBICORT 80-4.5 mcg/actuation inhaler, Inhale 1 puff 2 (two) times a day. , Disp: , Rfl: 11      therapeutic multivitamin (THERAGRAN) tablet, Take 1 tablet by mouth daily., Disp: , Rfl:      tolterodine (DETROL) 2 MG tablet, Take 1 tablet (2 mg total) by mouth 2 (two) times a day., Disp: 180 tablet, Rfl: 1     escitalopram oxalate (LEXAPRO) 20 MG tablet, Take 20 mg by mouth daily., Disp: , Rfl:      traMADol (ULTRAM) 50 mg tablet, Take 1-2 tablets ( mg total) by mouth every 8 (eight) hours as needed for pain., Disp: 45 tablet, Rfl: 0     Reviewed and updated: medical history, family history, medications and allergies.     Review of Systems     General: Denies fever, chills, fatigue.  Cardiovascular: Denies chest pain, dyspnea on exertion, palpitations.  Respiratory: Denies dyspnea, cough, wheezing.  GI: Denies nausea, vomiting, diarrhea, constipation.     Objective     Vitals:    12/29/18 1346   BP: 122/78   Patient Site: Right Arm   Patient Position: Sitting   Cuff Size: Adult Regular   Pulse: 81   Temp: 98  F (36.7  C)   TempSrc: Oral   SpO2: 98%   Weight: 122 lb 3.2 oz (55.4 kg)        Reviewed vital signs.  General: Appears calm, comfortable. Answers questions quickly and appropriately with clear speech. No apparent distress.  Skin: Pink, warm, dry.  HENT: Normocephalic, atraumatic. TMs and canals clear bilaterally. No lymphadenopathy.  Tenderness over left maxillary sinus.  Neck: Supple, without lymphadenopathy or thyromegaly.  Cardiovascular: Regular rate and rhythm, clear S1/S2 without murmur, rub, or gallop.  Respiratory: Lung sounds are clear and equal bilaterally. Normal respiratory effort.  Neuro: Memory and cognition appear normal. Normal gait.  No facial droop.  Psych: Mood and affect appear normal.     Imaging:   No results found.    Labs:  No results found for this or any previous visit (from the past 24 hour(s)).     Medical Decision-Making     Natalie is a generally well-appearing 73-year-old female presents with 4 days of URI symptoms including rhinorrhea, nasal congestion, and sore  throat.  She also has some tenderness over her left maxillary sinus.  Appearance is nontoxic.  She is not tachycardic, tachypnea, hypoxic, or febrile.  He does not exhibit increased work of breathing.  Physical exam is remarkable only for some left-sided tenderness over the maxillary sinus.  Given the short duration of her sinus symptoms, I do not believe this is likely to be bacterial in nature so no antibiotics were prescribed.  History and exam are most consistent with a viral URI with associated sinusitis.  We did discuss symptomatic management of her URI symptoms including use of nasal steroids and antihistamines.  We also discussed how the same approach can be helpful in promoting drainage of her sinuses and help prevent bacterial sinusitis.    Reviewed red flags that would trigger a prompt return to the clinic as noted below under patient instructions.  She expressed understanding of these directions and is in agreement with the plan.     Patient Instructions     Patient Instructions   Please return to the clinic if you notice any of the following:    Fever of 103F or higher.    Symptoms that appear to get better, but then return with a fever and worse cough.    Difficulty breathing, either at rest or with exertion.    A humidifier in your bedroom can be very helpful in reducing the morning nasal pain and severe congestion.    You can use saline nasal spray throughout the day for comfort and to help relieve nasal congestion and dryness.    Fluticasone nasal spray (Flonase) or other steroid nasal sprays can be very helpful with congestion. Use up to twice per day, 1 spray in each nostril. Before using the steroid nasal spray, clean your nose by using the nasal saline spray, waiting 30 - 60 seconds, and blowing your nose. Repeat if needed. Then use the steroid spray. This will help remove as much mucus as possible, maximizing the effectiveness of the steroid spray.    A daily antihistamine, either loratadine  (Claritin) or cetirizine (Zyrtec) can also be very helpful to reduce nasal inflammation and mucus production.        Discussed benefit vs risk of medications, dosing, side effects.  Patient was able to verbalize understanding.  After visit summary was provided for patient.     Jose Ramon Sood PA-C

## 2021-06-23 NOTE — PROGRESS NOTES
Discission:   Patient is on her way to [place] to see her grand kids and she will not be back tell next week but we did talk about trying to fine a different PCP

## 2021-06-23 NOTE — PROGRESS NOTES
Attempt 1: Care Guide called patient.  If this patient is returning my call, please transfer to Samantha at ext [phone].

## 2021-06-23 NOTE — PROGRESS NOTES
Discission:   Patient is not happy with [facility] on thing is that the [facility] doesn't have a women internal med doctor and patient doesn't want to see a male, patient doesn't want to see a CNP eaither she wants to see a MD. Care guide sent the patient info on the [facility] and the [facility]. Care guide is going to reach out to patient in two weeks to see if she wants to schedule with a different MD.       Samantha Weiner   [phone]  Clinic Care Coordinator

## 2021-06-24 NOTE — TELEPHONE ENCOUNTER
Test Results  Who is calling?:  Patient  Who ordered the test:  Jocelin Kerr MD   Type of test: Imaging  Date of test:  2/27  Where was the test performed:  [facility]  What are your questions/concerns?:  Patient was informed of the message below.  Okay to leave a detailed message?:  No         Dear Natalie,    We are pleased to let you know the results of your recent Breast Imaging Study(s) done on 2/27/19 show no sign of breast cancer.

## 2021-06-24 NOTE — PROGRESS NOTES
"ASSESSMENT and PLAN:  1. Hypertension  Suboptimal control, but her knee is painful today.  It has been under good control.  No changes made in meds today.  I will update her labs.  - Comprehensive Metabolic Panel    2. Mass on back  I suspect this is a lipoma.  I wanted to order a soft tissue u/s of her back, but the only available order included a biopsy.  We had her meet w/ specialty scheduling and radiology wasn't ok w/ the biopsy part of the order.  They directed us to order an abd u/s and put \"evaluate mass on back\" in the comments.    - US Soft Tissue Biopsy; Future  - US Abdomen Limited; Future    3. Recurrent sinus infections  She's contemplating leaving the clinic b/c she's so upset over not being albe to call in w/ her classic sx and get an abx.  Given her hx, I printed an rx for her to have when she develops her next infection.    - amoxicillin-clavulanate (AUGMENTIN) 875-125 mg per tablet; Take 1 tablet by mouth 2 (two) times a day for 14 days.  Dispense: 28 tablet; Refill: 0    4. Mixed hyperlipidemia  We'll update her labs today.  - Comprehensive Metabolic Panel  - Lipid Cascade    Medications Discontinued During This Encounter   Medication Reason     tolterodine (DETROL) 2 MG tablet Therapy completed       No Follow-up on file.    Patient Instructions   Ultrasound of your back:  [phone].  The only soft tissue ultrasound I can find is one that offers biopsy if needed.  I don't think you will need that, but if the radiologist feels it's necessary, then they'll do that.    Check with your insurance company about the shingles vaccine.    Take care!      CHIEF COMPLAINT:  Chief Complaint   Patient presents with     Mass     Spine x years       HISTORY OF PRESENT ILLNESS:  Natalie Walters is a 73 y.o. female  presenting to the clinic today for evaluation of a lump that she's had to the right of her mid spine for many years.  It's been itching recently.  It's not painful.  It hasn't grown.  It hasn't " "drained.    She wanted to express frustration with not being able to call in to get antibiotics when she has a sinus infection.  She reports a long history of recurrent sinus infections.  She's had surgery at [facility].  She's been told that if she continues having sinus infections w/ drainage, she will continue to have bronchiectasis.      She would like to update any lab work that might be needed.  She has HTN and hyperlipidemia.    REVIEW OF SYSTEMS:   MSK:  Her knee is painful  All other systems are negative.    PFSH:  Family History   Problem Relation Age of Onset     Pancreatic cancer Mother      Arthritis Brother      Breast cancer Maternal Grandmother      Heart attack Paternal Grandfather      Colon cancer Neg Hx      TOBACCO USE:  Social History     Tobacco Use   Smoking Status Never Smoker   Smokeless Tobacco Never Used       VITALS:  Vitals:    03/12/19 1528   BP: 144/90   Patient Site: Right Arm   Patient Position: Sitting   Cuff Size: Adult Regular   Pulse: 72   Weight: 121 lb 9.6 oz (55.2 kg)   Height: 5' 2\" (1.575 m)     Wt Readings from Last 3 Encounters:   03/12/19 121 lb 9.6 oz (55.2 kg)   12/29/18 122 lb 3.2 oz (55.4 kg)   05/02/18 128 lb 1 oz (58.1 kg)     PHYSICAL EXAM:  Constitutional:  Reveals an alert, pleasant adult female.   Vitals:  Noted.   Back: soft, oval like, soft, rubbery mobile mass thoracic back, right lateral aspect of her spine, nontender    MEDICATIONS:  Current Outpatient Medications   Medication Sig Dispense Refill     acetaminophen (TYLENOL) 650 MG CR tablet Take 650 mg by mouth 4 (four) times a day. 8 am, noon, 4 pm, 8 pm       albuterol (PROAIR HFA) 90 mcg/actuation inhaler Inhale 1-2 puffs.       amoxicillin (AMOXIL) 500 MG tablet TAKE 4 TABLETS BY MOUTH 1 HOUR BEFORE DENTAL APPOINTMENTS  1     aspirin 81 MG EC tablet Take 81 mg by mouth daily.       atorvastatin (LIPITOR) 40 MG tablet Take 1 tablet (40 mg total) by mouth daily. 90 tablet 3     cholecalciferol, vitamin D3, " (CHOLECALCIFEROL) 1,000 unit tablet Take 1,000 Units by mouth daily.       escitalopram oxalate (LEXAPRO) 20 MG tablet TAKE 1 TABLET (20 MG TOTAL) BY MOUTH DAILY. 90 tablet 1     ferrous sulfate 65 mg elemental iron Take 1 tablet by mouth 2 (two) times a week. Tuesdays and Fridays       fluticasone (FLONASE) 50 mcg/actuation nasal spray TAKE 2 PUFFS EACH NOSTRIL EVERY DAY 48 g 2     irbesartan (AVAPRO) 150 MG tablet TAKE 1 TABLET (150 MG TOTAL) BY MOUTH DAILY. 90 tablet 3     lisinopril (PRINIVIL,ZESTRIL) 10 MG tablet Take 10 mg by mouth.       MYRBETRIQ 50 mg Tb24 ER tablet TAKE 1 TABLET (50 MG) ONCE DAILY SWALLOWING WHOLE WITH WATER. DO NOT CRUSH, CHEW AND/OR DIVIDE.  1     omeprazole (PRILOSEC) 40 MG capsule Take 1 capsule (40 mg total) by mouth daily. 90 capsule 1     SYMBICORT 80-4.5 mcg/actuation inhaler TAKE 2 PUFFS BY MOUTH EVERY 12 HOURS 30.6 Inhaler 1     therapeutic multivitamin (THERAGRAN) tablet Take 1 tablet by mouth daily.       VOLTAREN 1 % Gel APPLY 1/2 GRAM TOPICALLY 3 TIMES A DAY  0     amoxicillin-clavulanate (AUGMENTIN) 875-125 mg per tablet Take 1 tablet by mouth 2 (two) times a day for 14 days. 28 tablet 0     No current facility-administered medications for this visit.

## 2021-06-24 NOTE — PATIENT INSTRUCTIONS - HE
Ultrasound of your back:  [phone].  The only soft tissue ultrasound I can find is one that offers biopsy if needed.  I don't think you will need that, but if the radiologist feels it's necessary, then they'll do that.    Check with your insurance company about the shingles vaccine.    Take care!

## 2021-06-24 NOTE — PROGRESS NOTES
Attempt 1: Care Guide called patient.  If this patient is returning my call, please transfer to Samantha at ext [phone].

## 2021-06-24 NOTE — PROGRESS NOTES
Discission:   Patient was give names for the Female internal med doctors that are at [place] and [place], she is going to look them up and see if she would like to establish care with one the providers. Patient stated that she has all of what she needs to take care of her  and she is ok about moving into maintenance. Patient is moving to a Wellness Plan. Please review emergency plan and update if needed.   Samantha Weiner   [phone]  Clinic Care Coordinator

## 2021-06-24 NOTE — PROGRESS NOTES
Disciplines Present: RN, Care Guides and [facility] Coni Ansari    The patient was discussed during weekly Care Conference Huddle today.   Goals and barriers were discussed and a plan was established.        Goals        Patient Stated      CCC-Patient Stated I need to find a new PCP  (pt-stated)      Action steps to achieve this goal  1.  I will go to the appointment to establish care with a different Provider   2.  I will talk to my care guide when she call    Date goal set:  1/22/19        I would like to find resources to help me care for my  better (pt-stated)      Action steps to achieve this goal    1.  I will work with my CG to find local resources that can help me take care of my .   2.  I will call the [employer] line to ask them about dental insurance options for my . 11/8/17  3.  I will investigate some of the private-pay home care (for ) options given to me at my RN Assessment visit.     Date goal set:  6/12/17              Barriers: Reported that patient is unhappy with not being able to have a female PCP at [facility]. Patient has been given resources for other clinics and offered to have a NP.  Patient difficult to reach and unable to discuss other goals due to her dissatisfaction with the PCP.     Action Plan if indicated: Plan is to have care guide outreach to patient to discuss if wants to work on goals, and if unable to reach or if does not have PCP that she is happy with, okay to discuss unenrollment with clinic care coordination.    Plan/Follow up needed: as above.

## 2021-06-24 NOTE — TELEPHONE ENCOUNTER
RN cannot approve Refill Request    RN can NOT refill this medication historical medication requested. Last office visit: 5/2/2018 Jocelin Kerr MD Last Physical: 2/7/2018 Last MTM visit: Visit date not found Last visit same specialty: 5/2/2018 Jocelin Kerr MD.  Next visit within 3 mo: Visit date not found  Next physical within 3 mo: Visit date not found      Arielle Bolivar, Care Connection Triage/Med Refill 2/18/2019    Requested Prescriptions   Pending Prescriptions Disp Refills     SYMBICORT 80-4.5 mcg/actuation inhaler [Pharmacy Med Name: SYMBICORT 80-4.5 MCG INHALER] 30.6 Inhaler 1     Sig: TAKE 2 PUFFS BY MOUTH EVERY 12 HOURS    There is no refill protocol information for this order

## 2021-06-25 NOTE — TELEPHONE ENCOUNTER
"Who is calling:  Patient  Reason for Call:  Patient tried to schedule her US appointment, Patient states wrong order was placed, I advised patient that MD received call from Radiology regarding CT order that's were placed. Pt advised  MD was advised to change order to US of abdomen to get a better view) Pt was understands  MD message will make her appointment soon and follow up with MD.    Date of last appointment with primary care:    Has the patient been recently seen:  Yes  Okay to leave a detailed message:  NO call back needed        2. Mass on back  I suspect this is a lipoma.  I wanted to order a soft tissue u/s of her back, but the only available order included a biopsy.  We had her meet w/ specialty scheduling and radiology wasn't ok w/ the biopsy part of the order.  They directed us to order an abd u/s and put \"evaluate mass on back\" in the comments.    - US Soft Tissue Biopsy; Future  - US Abdomen Limited; Future      "

## 2021-06-27 NOTE — PROGRESS NOTES
Progress Notes by Sunitha Paniagua RN at 6/17/2019 10:47 AM     Author: Sunitha Paniagua RN Service: -- Author Type: Registered Nurse    Filed: 6/17/2019 11:29 AM Encounter Date: 6/17/2019 Status: Signed    : Sunitha Paniagua RN (Registered Nurse)       S= Situation: Chart review of clinic care coordination enrollment status   B= Background: Patient completed goals, completed maintenance follow up with community health worker, no new needs identified   A= Action Steps:  Clinic care coordination will be available in future for patient  if needed.   R= Recommendation: Transition to graduation wellness status    Care guide Delegations from RN CC : Please send graduation wellness plan to patient and outreach per standard work protocol     Your Asthma: Flare-Ups  When you have asthma, the airways in your lungs are swollen. This narrows the airways, making it hard to breathe. During an asthma flare-up (asthma attack) the lining of the airways swells even more and makes extra mucus. This makes the airways even narrower. The muscles around the airways also tighten. This makes it even harder for air to get in and out of the lungs.  What causes flare-ups?  Flare-ups occur when the airways with asthma react to a trigger. These are things that make asthma worse. Triggers can include smoke, odors, chemicals, pollen, pets, mold, cockroaches, and dust. Other things can also trigger a flare-up. These include exercise, having a cold or the flu, and changes in the weather.  What are the symptoms of a flare-up?  You are having might be having a flare up if you have any of the following:  Trouble breathing  Breathing faster than usual  Wheezing. This is a whistling noise when breathing out.  Feeling tightness or pain in the chest  Coughing, especially at night  Trouble sleeping  Getting tired or out of breath easily  Having trouble talking  What to do during a flare-up  When you are starting to have symptoms, dont wait!  Follow your Asthma Action Plan. It should tell you exactly what symptoms signal a flare-up . It should also tell you what to do. This may include doing the following:  Use quick-relief (rescue) medicine. Quick-relief medicines to ease your breathing right away.  Measure your flow if you use peak flow monitoring. If peak flow is less than 50%, your flare-up is severe. You need to call your healthcare provider right away. You should also call 911 if you are having any of the symptoms listed in the box below.  If you do not have an Asthma Action Plan or if the plan is not up to date, talk with your healthcare provider.     When to call 911  Call 911 right away if you have any of the following symptoms. They could mean you are having severe difficulty breathing:  Very fast or hard breathing  Sinking in between the ribs and above and below the breastbone (chest retractions)  Can't walk or talk  Lips or fingers turning blue  Peak flow reading less than 50% of normal best  Not acting as normal or seems confused  Not responding to asthma treatments   Preventing worsening symptoms and flare-ups  To help control asthma, you should help have help with the following:  Work together with your healthcare provider. Controlling asthma takes teamwork. Keep all appointments with your healthcare provider. Dont just make an appointment when you have a flare-up. Follow your Asthma Action Plan.  Use controller medicines as instructed. Make sure you use your long-term controller medicines. These may include corticosteroids and other anti-inflammatory medicines. A person with asthma can have inflamed airways any time, not just when he or she has symptoms. Controller medicines must be taken every day, even when you feels well.  Identify and manage flare-ups right away. Learn to recognize early symptoms and to act quickly. Start quick-relief medicines as instructed if you begin to have symptoms of a respiratory infection and respiratory  infections trigger his or her symptoms.   Control triggers. Stay away from things that cause asthma symptoms is another important way to control asthma. Once you know the triggers, take steps to control them. For example, if someone in your household smokes, he or she should think about quitting. Many excellent stop-smoking programs and medicines can help. Also don't allow anyone to smoke near your child, including in your home and car.    9345-8560 [employer]. [address] [place] [address], [place] [id number]. All rights reserved. This information is not intended as a substitute for professional medical care. Always follow your healthcare professional's instructions.            High Blood Pressure (Hypertension)  You have been diagnosed with high blood pressure (also called hypertension). This means the force of blood against your artery walls is too strong. It also means your heart is working hard to move blood. High blood pressure usually has no symptoms, but over time, it can damage your heart, blood vessels, eyes, kidneys, and other organs. With help from your doctor, you can manage your blood pressure and protect your health.  Taking medications    Learn to take your own blood pressure. Keep a record of your results. Ask your doctor which readings mean that you need medical attention.    Take your blood pressure medication exactly as directed. Dont skip doses. Missing doses can cause your blood pressure to get out of control.    Avoid medications that contain heart stimulants, including over-the-counter drugs. Check for warnings about high blood pressure on the label.    Check with your doctor before taking a decongestant. Some decongestants can worsen high blood pressure.  Lifestyle changes    Maintain a healthy weight. Get help to lose any extra pounds.    Cut back on salt.  o Limit canned, dried, packaged, and fast foods.  o Dont add salt to your food at the table.  o Season foods with herbs instead  of salt when you cook.    Follow the DASH (Dietary Approaches to Stop Hypertension) eating plan. This plan recommends vegetables, fruits, whole gains, and other heart healthy foods.    Begin an exercise program. Ask your doctor how to get started. The American Heart Association recommends aerobic exercise 3 to 4 times a week for an average of 40 minutes at a time, with your doctor's approval. Simple activities like walking or gardening can help.    Break the smoking habit. Enroll in a stop-smoking program to improve your chances of success. Ask your health care provider about programs and medications to help you stop smoking.    Limit drinks that contain caffeine (coffee, black or green tea, cola) to 2 per day.    Never take stimulants such as amphetamines or cocaine; these drugs can be deadly for someone with high blood pressure.    Control your stress. Learn stress-management techniques.    Limit alcohol to no more than 1 drink a day for women and 2 drinks a day for men.  Follow-up care  Make a follow-up appointment as directed by our staff.     When to seek medical care  Call your doctor immediately if you have any of the following:    Chest pain or shortness of breath (call 911)    Moderate to severe headache    Weakness in the muscles of your face, arms, or legs    Trouble speaking    Extreme drowsiness    Confusion    Fainting or dizziness    Pulsating or rushing sound in your ears    Unexplained nosebleed    Weakness, tingling, or numbness of your face, arms, or legs    Change in vision    Blood pressure measured at home that is greater than 180/110     Emergency Plan Recommendation:    When to Use the Emergency Department (ED)  An emergency means you could die if you dont get care quickly. Or you could be hurt permanently (disabled). Read below to know when to use -- and when not to use -- an emergency department (also called ED).    Dangers to your life  Here are examples of emergencies. These need  immediate care:  A hard time breathing  Severe chest pain  Choking  Severe bleeding  Suddenly not able to move or speak  Blacking out (fainting)`  Poisoning    Dangers of permanent injuries  Here are other emergencies. These also need immediate care:  Deep cuts or severe burns  Broken bones, or sudden severe pain and swelling in a joint    When its an emergency  If you have an emergency, follow these steps:    1. Go to the nearest emergency department  If you can, go to the hospital ED closest to you right away.  If you cannot get there right away, or if it is not safe to take yourself, call 911 or your police emergency number.  2. Call your primary care doctor  Tell your doctor about the emergency. Call within 24 hours of going to the ED.  If you cannot call, have someone call for you.  Go to your doctor (not the ED) for any follow-up care.    When its not an emergency  If a problem is not an emergency, follow these steps:    1. Call your primary care doctor  If you dont know the name of your doctor, call your health plan.  If you cannot call, have someone call for you.  2. Follow instructions  Your doctor will tell you what you should do.  You may be told to see your doctor right away. You may be told to go to the ED. Or you may be told to go to an urgent care center.  Follow your doctors advice.  Handwashing: Tips for Patients, Family, and Friends  Germs are everywhere around us. Normally, we live with germs without getting sick. In certain cases, harmful germs cause us to get sick with an infection. Or we can spread harmful germs to others and cause them to get sick. Keeping your hands clean is the best way to prevent getting or spreading germs that cause infection. Wash your hands with soap and water or use an alcohol-based hand .  When to clean your hands: For patients  In the hospital or in your home, you can come in contact with many harmful germs. To help prevent infection, wash your hands often,  especially:  After using the bathroom  Before and after eating  After coughing or sneezing  After using a tissue  After touching or changing a dressing or bandage  After touching any object or surface that may be contaminated  After touching an animal during a pet therapy session (hospital)  After touching an animal, cleaning up after a pet, or preparing food for pets (home)  If you dont have access to soap and water, use an alcohol-based hand gel containing at least 60% alcohol. These products kill most germs and are easy to use. But use soap and water (not alcohol-based hand gel) if your hands are visibly dirty.  When to clean your hands: For family and friends  When visiting or caring for a loved one, washing your hands or using an alcohol-based hand  can help stop germs from spreading. Wash your hands:  Before entering and after leaving the patients room  As soon as you remove gloves or other protective clothing  After changing a dressing or bandage  After any contact with blood or other body fluids  After touching or changing the patients bed linen or towels  After touching an animal during a pet therapy session (hospital)  After touching an animal, cleaning up after a pet, or preparing food for pets (home)  Many hospitals have sinks or gel dispensers right outside patient rooms. If not, carry a bottle of alcohol-based hand gel with you, and use it every time you visit. Use soap and water (not alcohol-based hand gel) if your hands are visibly dirty.  Tips for good handwashing  Here are some suggestions to follow:  Use warm water and plenty of soap. Work up a good lather.  Clean the whole hand, including under your nails, between your fingers, and up the wrists.  Wash for at least 15 to 20 seconds. Dont just wipe. Scrub well.  Rinse, letting the water run down your fingers, not up your wrists.  Dry your hands well. Use a paper towel to turn off the faucet and open the door.  Time matters  The longer you  wash your hands, the more germs youll remove. Most people wash their hands for 6 to 7 seconds. But at least 15 seconds are needed to remove germs. Singing Happy Birthday or the Alphabet Song are examples of how long 15 seconds would be. To protect yourself and others from infection, washing for 30 seconds is best.  How to use an alcohol-based hand   Alcohol-based hand  may kill more germs than soap and water. Use them when your hands arent visibly dirty. For best results, follow these steps:  Choose a gel or spray that contains at least 60 percent alcohol. Products with less alcohol may not kill germs.  Spread about a tablespoon of  in the palm of one hand.  Rub your hands together briskly, cleaning the backs of your hands, the palms, between your fingers, and up the wrists.  Rub until the  is gone, and your hands are completely dry.

## 2021-06-27 NOTE — PROGRESS NOTES
Progress Notes by Steffanie Martines RN at 3/8/2019 10:18 AM     Author: Steffanie Martines RN Service: -- Author Type: RN, Care Manager    Filed: 3/8/2019 10:20 AM Encounter Date: 3/8/2019 Status: Signed    : Steffanie Martines RN (Registered Nurse)       Maintenance Plan:  High Blood Pressure (Hypertension)  You have been diagnosed with high blood pressure (also called hypertension). This means the force of blood against your artery walls is too strong. It also means your heart is working hard to move blood. High blood pressure usually has no symptoms, but over time, it can damage your heart, blood vessels, eyes, kidneys, and other organs. With help from your doctor, you can manage your blood pressure and protect your health.  Taking medications    Learn to take your own blood pressure. Keep a record of your results. Ask your doctor which readings mean that you need medical attention.    Take your blood pressure medication exactly as directed. Dont skip doses. Missing doses can cause your blood pressure to get out of control.    Avoid medications that contain heart stimulants, including over-the-counter drugs. Check for warnings about high blood pressure on the label.    Check with your doctor before taking a decongestant. Some decongestants can worsen high blood pressure.  Lifestyle changes    Maintain a healthy weight. Get help to lose any extra pounds.    Cut back on salt.  o Limit canned, dried, packaged, and fast foods.  o Dont add salt to your food at the table.  o Season foods with herbs instead of salt when you cook.    Follow the DASH (Dietary Approaches to Stop Hypertension) eating plan. This plan recommends vegetables, fruits, whole gains, and other heart healthy foods.    Begin an exercise program. Ask your doctor how to get started. The American Heart Association recommends aerobic exercise 3 to 4 times a week for an average of 40 minutes at a time, with your doctor's approval. Simple activities like  walking or gardening can help.    Break the smoking habit. Enroll in a stop-smoking program to improve your chances of success. Ask your health care provider about programs and medications to help you stop smoking.    Limit drinks that contain caffeine (coffee, black or green tea, cola) to 2 per day.    Never take stimulants such as amphetamines or cocaine; these drugs can be deadly for someone with high blood pressure.    Control your stress. Learn stress-management techniques.    Limit alcohol to no more than 1 drink a day for women and 2 drinks a day for men.  Follow-up care  Make a follow-up appointment as directed by our staff.     When to seek medical care  Call your doctor immediately if you have any of the following:    Chest pain or shortness of breath (call 911)    Moderate to severe headache    Weakness in the muscles of your face, arms, or legs    Trouble speaking    Extreme drowsiness    Confusion    Fainting or dizziness    Pulsating or rushing sound in your ears    Unexplained nosebleed    Weakness, tingling, or numbness of your face, arms, or legs    Change in vision    Blood pressure measured at home that is greater than 180/110       Your Asthma: Flare-Ups  When you have asthma, the airways in your lungs are swollen. This narrows the airways, making it hard to breathe. During an asthma flare-up (asthma attack) the lining of the airways swells even more and makes extra mucus. This makes the airways even narrower. The muscles around the airways also tighten. This makes it even harder for air to get in and out of the lungs.  What causes flare-ups?  Flare-ups occur when the airways with asthma react to a trigger. These are things that make asthma worse. Triggers can include smoke, odors, chemicals, pollen, pets, mold, cockroaches, and dust. Other things can also trigger a flare-up. These include exercise, having a cold or the flu, and changes in the weather.  What are the symptoms of a flare-up?  You  are having might be having a flare up if you have any of the following:  Trouble breathing  Breathing faster than usual  Wheezing. This is a whistling noise when breathing out.  Feeling tightness or pain in the chest  Coughing, especially at night  Trouble sleeping  Getting tired or out of breath easily  Having trouble talking  What to do during a flare-up  When you are starting to have symptoms, dont wait! Follow your Asthma Action Plan. It should tell you exactly what symptoms signal a flare-up . It should also tell you what to do. This may include doing the following:  Use quick-relief (rescue) medicine. Quick-relief medicines to ease your breathing right away.  Measure your flow if you use peak flow monitoring. If peak flow is less than 50%, your flare-up is severe. You need to call your healthcare provider right away. You should also call 911 if you are having any of the symptoms listed in the box below.  If you do not have an Asthma Action Plan or if the plan is not up to date, talk with your healthcare provider.     When to call 911  Call 911 right away if you have any of the following symptoms. They could mean you are having severe difficulty breathing:  Very fast or hard breathing  Sinking in between the ribs and above and below the breastbone (chest retractions)  Can't walk or talk  Lips or fingers turning blue  Peak flow reading less than 50% of normal best  Not acting as normal or seems confused  Not responding to asthma treatments   Preventing worsening symptoms and flare-ups  To help control asthma, you should help have help with the following:  Work together with your healthcare provider. Controlling asthma takes teamwork. Keep all appointments with your healthcare provider. Dont just make an appointment when you have a flare-up. Follow your Asthma Action Plan.  Use controller medicines as instructed. Make sure you use your long-term controller medicines. These may include corticosteroids and other  anti-inflammatory medicines. A person with asthma can have inflamed airways any time, not just when he or she has symptoms. Controller medicines must be taken every day, even when you feels well.  Identify and manage flare-ups right away. Learn to recognize early symptoms and to act quickly. Start quick-relief medicines as instructed if you begin to have symptoms of a respiratory infection and respiratory infections trigger his or her symptoms.   Control triggers. Stay away from things that cause asthma symptoms is another important way to control asthma. Once you know the triggers, take steps to control them. For example, if someone in your household smokes, he or she should think about quitting. Many excellent stop-smoking programs and medicines can help. Also don't allow anyone to smoke near your child, including in your home and car.    4958-6925 [employer]. [address] [place] [address], [place] [address]. All rights reserved. This information is not intended as a substitute for professional medical care. Always follow your healthcare professional's instructions.

## 2021-06-27 NOTE — PROGRESS NOTES
Progress Notes by Samantha Weiner at 3/13/2019  9:25 AM     Author: Samantha Weiner Service: -- Author Type: Community Health Worker    Filed: 3/13/2019  9:40 AM Encounter Date: 3/13/2019 Status: Signed    : Samantha Weiner (Community Health Worker)       My Clinic Care Coordination Wellness Plan  This Maintenance Wellness Plan provides private information in regards to the work I have done with my Care Team from my Primary Care Clinic.  This document provides insight on the goals I have worked hard to achieve.  My Care Team congratulates me on my journey to become well.  With the assistance of my Clinic Care Guide and Primary Care Physician,  I have succeeded in improving areas of my health that I identified as barriers to becoming well.  I will continue to seek wellness and use the skills I have obtained to further my journey.  My Care Guide will follow up with me in 3 months.  In the meantime, if I should have any questions or concerns I will contact my Care Guide.       [facility]  [address] [phone]    My Preferred Method of Contact:  Phone: [phone]    My Primary/Preferred Language:  English    Preferred Learning Style:  Reading information, Face to face discussion, Pictures/Diagrams and Hands on teaching    Emergency Contact: Extended Emergency Contact Information  Primary Emergency Contact: Meño Walters  Address: [address] [place] [address]  [place] [address] [facility]  Home Phone: [phone]  Relation: Spouse  Secondary Emergency Contact: NONE,PER PT  Relation: Declined     PCP:  Jocelin Kerr MD  Specialists:    Care Team            Jocelin Kerr MD   [phone] PCP - General    Samantha Weiner Clinic Care Coordination Care Guide, Clinic Care Coordination    [facility], [phone] FX: [phone]          Accomplishments:  Goals        Patient Stated    ? COMPLETED: CCC-Patient  Stated I have the names of the Female Providers so I can Get a new PCP  (pt-stated)      Action steps to achieve this goal  1.  I will continue to make an appointment to establish care with a different female Provider       Completed 3/7/19  Date goal set:  1/22/19      ? COMPLETED: I have all the resources to help me care for my  better (pt-stated)      Personal Plan    1.  I will continue to work with my CG to find local resources that can help me take care of my .   2.  I will continue to call the [facility] line to ask them about dental insurance options for my . 11/8/17  3.  I will continue to investigate some of the private-pay home care (for ) options given to me at my RN Assessment visit.     Completed 3/7/19  Date goal set:  6/12/17      ? COMPLETED: I want to get help for my 's depression (pt-stated)      Action steps to achieve this goal    1.  I have discussed my concerns with his PCP, Dr. Salazar   2.  I have enrolled my  in clinic care coordination.   3.  I continue to support my  emotionally and encourage him to be open to treatment for his depression.     Date goal set:  6/12/17            Advanced Directive/Living Will: Not on file     Clinical Emergency Plan  High Blood Pressure (Hypertension)  You have been diagnosed with high blood pressure (also called hypertension). This means the force of blood against your artery walls is too strong. It also means your heart is working hard to move blood. High blood pressure usually has no symptoms, but over time, it can damage your heart, blood vessels, eyes, kidneys, and other organs. With help from your doctor, you can manage your blood pressure and protect your health.  Taking medications    Learn to take your own blood pressure. Keep a record of your results. Ask your doctor which readings mean that you need medical attention.    Take your blood pressure medication exactly as directed. Dont skip doses.  Missing doses can cause your blood pressure to get out of control.    Avoid medications that contain heart stimulants, including over-the-counter drugs. Check for warnings about high blood pressure on the label.    Check with your doctor before taking a decongestant. Some decongestants can worsen high blood pressure.  Lifestyle changes    Maintain a healthy weight. Get help to lose any extra pounds.    Cut back on salt.  ? Limit canned, dried, packaged, and fast foods.  ? Dont add salt to your food at the table.  ? Season foods with herbs instead of salt when you cook.    Follow the DASH (Dietary Approaches to Stop Hypertension) eating plan. This plan recommends vegetables, fruits, whole gains, and other heart healthy foods.    Begin an exercise program. Ask your doctor how to get started. The American Heart Association recommends aerobic exercise 3 to 4 times a week for an average of 40 minutes at a time, with your doctor's approval. Simple activities like walking or gardening can help.    Break the smoking habit. Enroll in a stop-smoking program to improve your chances of success. Ask your health care provider about programs and medications to help you stop smoking.    Limit drinks that contain caffeine (coffee, black or green tea, cola) to 2 per day.    Never take stimulants such as amphetamines or cocaine; these drugs can be deadly for someone with high blood pressure.    Control your stress. Learn stress-management techniques.    Limit alcohol to no more than 1 drink a day for women and 2 drinks a day for men.  Follow-up care  Make a follow-up appointment as directed by our staff.     When to seek medical care  Call your doctor immediately if you have any of the following:    Chest pain or shortness of breath (call 911)    Moderate to severe headache    Weakness in the muscles of your face, arms, or legs    Trouble speaking    Extreme drowsiness    Confusion    Fainting or dizziness    Pulsating or rushing  sound in your ears    Unexplained nosebleed    Weakness, tingling, or numbness of your face, arms, or legs    Change in vision    Blood pressure measured at home that is greater than 180/110              Your Asthma: Flare-Ups  When you have asthma, the airways in your lungs are swollen. This narrows the airways, making it hard to breathe. During an asthma flare-up (asthma attack) the lining of the airways swells even more and makes extra mucus. This makes the airways even narrower. The muscles around the airways also tighten. This makes it even harder for air to get in and out of the lungs.  What causes flare-ups?  Flare-ups occur when the airways with asthma react to a trigger. These are things that make asthma worse. Triggers can include smoke, odors, chemicals, pollen, pets, mold, cockroaches, and dust. Other things can also trigger a flare-up. These include exercise, having a cold or the flu, and changes in the weather.  What are the symptoms of a flare-up?  You are having might be having a flare up if you have any of the following:    Trouble breathing    Breathing faster than usual    Wheezing. This is a whistling noise when breathing out.    Feeling tightness or pain in the chest    Coughing, especially at night    Trouble sleeping    Getting tired or out of breath easily    Having trouble talking  What to do during a flare-up  When you are starting to have symptoms, dont wait! Follow your Asthma Action Plan. It should tell you exactly what symptoms signal a flare-up . It should also tell you what to do. This may include doing the following:    Use quick-relief (rescue) medicine. Quick-relief medicines to ease your breathing right away.    Measure your flow if you use peak flow monitoring. If peak flow is less than 50%, your flare-up is severe. You need to call your healthcare provider right away. You should also call 911 if you are having any of the symptoms listed in the box below.  If you do not have an  Asthma Action Plan or if the plan is not up to date, talk with your healthcare provider.          When to call 911  Call 911 right away if you have any of the following symptoms. They could mean you are having severe difficulty breathing:    Very fast or hard breathing    Sinking in between the ribs and above and below the breastbone (chest retractions)    Can't walk or talk    Lips or fingers turning blue    Peak flow reading less than 50% of normal best    Not acting as normal or seems confused    Not responding to asthma treatments   Preventing worsening symptoms and flare-ups  To help control asthma, you should help have help with the following:    Work together with your healthcare provider. Controlling asthma takes teamwork. Keep all appointments with your healthcare provider. Dont just make an appointment when you have a flare-up. Follow your Asthma Action Plan.    Use controller medicines as instructed. Make sure you use your long-term controller medicines. These may include corticosteroids and other anti-inflammatory medicines. A person with asthma can have inflamed airways any time, not just when he or she has symptoms. Controller medicines must be taken every day, even when you feels well.    Identify and manage flare-ups right away. Learn to recognize early symptoms and to act quickly. Start quick-relief medicines as instructed if you begin to have symptoms of a respiratory infection and respiratory infections trigger his or her symptoms.     Control triggers. Stay away from things that cause asthma symptoms is another important way to control asthma. Once you know the triggers, take steps to control them. For example, if someone in your household smokes, he or she should think about quitting. Many excellent stop-smoking programs and medicines can help. Also don't allow anyone to smoke near your child, including in your home and car.    2974-9914 [employer]. [address] [facility],  [place] [address]. All rights reserved. This information is not intended as a substitute for professional medical care. Always follow your healthcare professional's instructions.                             All [facility] clinic patients have access to a Nurse 24 hours a day, 7 days a week.  If you have questions or want advice from a Nurse, please know [facility] is here for you.  You can call your clinic and they will connect you or you can call Care [facility] at [phone].  [facility] also has Walk In Care clinics in multiple locations.  Call the number listed above for more information about our Walk In Care clinics or visit the [facility] website at [email or web address].

## 2021-07-03 NOTE — ADDENDUM NOTE
Addendum Note by William Olvera CNP at 12/31/2018  2:56 PM     Author: William Olvera CNP Service: -- Author Type: Nurse Practitioner    Filed: 12/31/2018  2:56 PM Encounter Date: 12/31/2018 Status: Signed    : William Olvera CNP (Nurse Practitioner)    Addended by: WILLIAM OLVERA on: 12/31/2018 02:56 PM        Modules accepted: Orders

## 2021-07-03 NOTE — ADDENDUM NOTE
Addendum Note by Kayla Gomez MD at 5/4/2020  5:18 PM     Author: Kayla Gomez MD Service: -- Author Type: Physician    Filed: 5/4/2020  5:18 PM Encounter Date: 5/4/2020 Status: Signed    : Kayla Gomez MD (Physician)    Addended by: KAYLA GOMEZ on: 5/4/2020 05:18 PM        Modules accepted: Orders

## 2021-07-03 NOTE — ADDENDUM NOTE
Addendum Note by Carly Lee CMA at 5/4/2020  2:00 PM     Author: Carly Lee CMA Service: -- Author Type: Certified Medical Assistant    Filed: 5/4/2020  2:00 PM Encounter Date: 5/4/2020 Status: Signed    : Carly Lee CMA (Certified Medical Assistant)    Addended by: KENNEY LEE on: 5/4/2020 02:00 PM        Modules accepted: Orders

## 2021-07-03 NOTE — ADDENDUM NOTE
Addendum Note by Kayla Gomez MD at 3/16/2020  4:09 PM     Author: Kayla Gomez MD Service: -- Author Type: Physician    Filed: 3/16/2020  4:09 PM Encounter Date: 3/16/2020 Status: Signed    : Kayla Gomez MD (Physician)    Addended by: KAYLA GOMEZ on: 3/16/2020 04:09 PM        Modules accepted: Orders

## 2021-10-24 ENCOUNTER — HEALTH MAINTENANCE LETTER (OUTPATIENT)
Age: 76
End: 2021-10-24

## 2022-02-13 ENCOUNTER — HEALTH MAINTENANCE LETTER (OUTPATIENT)
Age: 77
End: 2022-02-13

## 2022-04-26 NOTE — TELEPHONE ENCOUNTER
Reached out to patient and left a message to return phone call. Please relay the below message to patient when she calls back. Thank you, Angie Ar[facility]ellesSt[facility]: [phone]   [General Appearance - Well Developed] : well developed [Normal Appearance] : normal appearance [Well Groomed] : well groomed [General Appearance - Well Nourished] : well nourished [No Deformities] : no deformities [General Appearance - In No Acute Distress] : no acute distress [Normal Oral Mucosa] : normal oral mucosa [No Oral Pallor] : no oral pallor [No Oral Cyanosis] : no oral cyanosis [Normal Jugular Venous A Waves Present] : normal jugular venous A waves present [Normal Jugular Venous V Waves Present] : normal jugular venous V waves present [No Jugular Venous Wade A Waves] : no jugular venous wade A waves [Respiration, Rhythm And Depth] : normal respiratory rhythm and effort [Exaggerated Use Of Accessory Muscles For Inspiration] : no accessory muscle use [Auscultation Breath Sounds / Voice Sounds] : lungs were clear to auscultation bilaterally [Heart Rate And Rhythm] : heart rate and rhythm were normal [Heart Sounds] : normal S1 and S2 [Systolic grade ___/6] : A grade [unfilled]/6 systolic murmur was heard. [Abdomen Soft] : soft [Abdomen Tenderness] : non-tender [Abdomen Mass (___ Cm)] : no abdominal mass palpated [Nail Clubbing] : no clubbing of the fingernails [Cyanosis, Localized] : no localized cyanosis [Petechial Hemorrhages (___cm)] : no petechial hemorrhages [] : no ischemic changes [Skin Color & Pigmentation] : normal skin color and pigmentation [Oriented To Time, Place, And Person] : oriented to person, place, and time

## 2022-09-19 NOTE — PROGRESS NOTES
Clinic Care Coordination RN Assessment:    Pt arrived on time for her RN assessment for enrollment into clinic care coordination.  She was well-groomed and dressed appropriately for the weather.  She was ambulatory without assistance to the exam room.  Her primary concern is not for herself, but for her .  Her medical problems are adequately addressed and treated by her doctor's at the [facility].  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  She voices that this is becoming overwhelming for her and she is sometimes frustrated by her role. She also reports some memory problems, but her doctors have told her this is most likely related to stress from caring for her .  She is very worried about his mental health and states he is quite depressed.  He is resistant to treatment (medications) and this causes her stress.  When asked for her goals, getting him help was her primary goal.  See below for complete assessment.      Action Plan:  Total PCAM Score: Little to no RN or  intervention needed. Recommend standard Care Guide outreach for coordination needs.    RN Will:  1)  Not add to RN panel at this time  2)  Defer to Care Guide for outreach. The Care Guide can reach out to RN/SW as needed for support or with new concerns.   3)  Request that  enroll in the CCC to better meet the families' needs  4)  Provide a list of private-pay, unskilled home care services (Done at end of assessment appointment)    Care Guide Will:  Care Guide Delegation:  1) Due Date: Within 2 week from the RN assessment visit [Insert date]  Delegation: Help the patient to coordinate goal setting visit if not already coordinated.     2) Due Date: At Goal setting visit      Delegation: Review goals set at PCAM visit and create or add to action plan.   Discuss enrolling the patient's .  (Pt is aware that this is needed)  Provide with Senior Linkage Line information for help  "in finding dental insurance    3) Due Date: after goal setting appointment      Delegation: help arrange patient's 's enrollment if she is agreeable. May need to discuss with his PCP (Dr. Salazar) as well    Goals       I want to get help for my 's depression (pt-stated)            Action steps to achieve this goal  1.  I will discuss my concerns with his PCP, Dr. Salazar  2.  I will be open to having my  enroll in the [facility] to better help him  3.  I will continue to support my  emotionally and encourage him to be open to treatment for his depression    Date goal set:  6/12/17        I would like to find resources to help me care for my  better (pt-stated)            Action steps to achieve this goal  1.  I will work with my CG to find local resources that can help me take care of my   2.  I will call the [facility] Linkage line to ask them about dental insurance options for my   3.  I will investigate some of the private-pay home care (for ) options given to me at my RN Assessment visit     Date goal set:  6/12/17              PCAM (Patient Centered Assessment Method)     Health and Well-Being:    Physical health needs:    Notes she has bronchiectasis which is currently controlled with a \"Smart Vest\" (CPT). Sees a pulmonologist at [place] for this.    Has essential tremors which is followed by a neurologist at [place]    Has osteoarthritis in her knees, but does not c/o significant pain    Diagnosed with low bone density.  Currently waiting to hear back from Dr. Kerr regarding this.     Mental Health Needs:    Reports depressed mood/sadness occasionally, but not pervasive.  Mostly related to her 's condition and the stress of being his primary caregiver    Lifestyle/Habits    Eats a healthy diet, but does like \"sweets\"; does not cook big meals often, but her and her  do not go out to eat due to his significant health problems    Reports no sleep " "issues    Denies much alcohol use, has a drink very occasionally    Denies smoking    Social Environment:    Home Environment:    Lives in a two-story house with her ; stairs are not a problem    They live [place] acres and she feels very safe in her neighborhood    Happy with their situation but is understandably worried about how long they can stay their safely as her 's condition worsens    She drives and has a reliable car    Daily Activities:    Independent with all ADLs and IADLs    Primary caregiver for her  who needs assist with personal cares and all IADLs    Reads and does crossword puzzles in her downtime    Usually runs errands at least daily.  She leaves her  for 2-4 hours at time    Social Network:     is primary support    Has many female friends and sees them fairly frequently for lunch or dinner    Attends [employer] weekly    Two sons live out of state ([place] and [place]); not seeing them often is a stressor for her    Has a brother and his wife in [place], [place] who are supportive    Financial Status and Services:    Concerned about how she will be able to live once her  passes.  They \"didn't plan well\" and do not have any investments to assist with shelter income.     Concerns over dental bills for her  whose \"teeth are rotting out.\"  Does not currently have a dental plan    No concerns over medical bills and is able to afford their medications    Health Literacy and Communication:    Understanding of Health and Wellbeing:    Very good knowledge of her health conditions.  Tries her best to take good care of herself.   Engagement:    Very attentive during conversations; asks appropriate questions.  Knows how to get in touch with her doctor and doesn't hesitate to ask questions when she doesn't understand something medical.     Compliance with Medical Recommendations to date:    Notes she is always compliant with doctor's instructions and her medications.  " Keeps appointments to the best of her ability      Service Coordination:    Services Needed:    Help finding private pay home care options    Help investigating dental insurance options    Help coordinating her 's care--especially getting him some mental health care    Coordination of Services:    CG to assist with above    Emergency Plan:     Preventing Falls    Having a health problem can make you more likely to fall. Taking certain kinds of medicines may also increase your risk of falls. So, improving your health can help you avoid a fall. Work with your healthcare provider to manage health problems and to review your medicines. If you have your health under control, your risk of falling is lessened.    When to call your healthcare provider  Be sure to call your healthcare provider if you fall. Also call if you have any of these signs or symptoms (someone else may need to point them out to you):    Feeling lightheaded or dizzy more than once a day    Losing your balance often or feeling unsteady on your feet    Feeling numbness in your legs or feet, or noticing a change in the way you walk    Having a steady decline in your memory or mental sharpness       numerical 0-10

## 2022-10-15 ENCOUNTER — HEALTH MAINTENANCE LETTER (OUTPATIENT)
Age: 77
End: 2022-10-15

## 2022-11-27 ENCOUNTER — HEALTH MAINTENANCE LETTER (OUTPATIENT)
Age: 77
End: 2022-11-27

## 2023-03-25 ENCOUNTER — HEALTH MAINTENANCE LETTER (OUTPATIENT)
Age: 78
End: 2023-03-25

## 2024-01-07 ENCOUNTER — HEALTH MAINTENANCE LETTER (OUTPATIENT)
Age: 79
End: 2024-01-07